# Patient Record
Sex: MALE | Race: WHITE | NOT HISPANIC OR LATINO | Employment: FULL TIME | URBAN - METROPOLITAN AREA
[De-identification: names, ages, dates, MRNs, and addresses within clinical notes are randomized per-mention and may not be internally consistent; named-entity substitution may affect disease eponyms.]

---

## 2017-01-12 ENCOUNTER — OFFICE VISIT (OUTPATIENT)
Dept: SLEEP MEDICINE | Facility: CLINIC | Age: 52
End: 2017-01-12
Payer: COMMERCIAL

## 2017-01-12 VITALS
SYSTOLIC BLOOD PRESSURE: 100 MMHG | BODY MASS INDEX: 34.75 KG/M2 | DIASTOLIC BLOOD PRESSURE: 60 MMHG | HEART RATE: 71 BPM | OXYGEN SATURATION: 95 % | WEIGHT: 249.13 LBS

## 2017-01-12 DIAGNOSIS — G47.33 OSA (OBSTRUCTIVE SLEEP APNEA): Primary | ICD-10-CM

## 2017-01-12 DIAGNOSIS — R09.81 NASAL CONGESTION: ICD-10-CM

## 2017-01-12 DIAGNOSIS — G47.00 INSOMNIA, UNSPECIFIED TYPE: ICD-10-CM

## 2017-01-12 PROCEDURE — 3078F DIAST BP <80 MM HG: CPT | Mod: S$GLB,,, | Performed by: INTERNAL MEDICINE

## 2017-01-12 PROCEDURE — 1159F MED LIST DOCD IN RCRD: CPT | Mod: S$GLB,,, | Performed by: INTERNAL MEDICINE

## 2017-01-12 PROCEDURE — 3074F SYST BP LT 130 MM HG: CPT | Mod: S$GLB,,, | Performed by: INTERNAL MEDICINE

## 2017-01-12 PROCEDURE — 99203 OFFICE O/P NEW LOW 30 MIN: CPT | Mod: S$GLB,,, | Performed by: INTERNAL MEDICINE

## 2017-01-12 PROCEDURE — 99999 PR PBB SHADOW E&M-EST. PATIENT-LVL III: CPT | Mod: PBBFAC,,, | Performed by: INTERNAL MEDICINE

## 2017-01-12 NOTE — PROGRESS NOTES
Geo Lang  was seen as a follow up.  Our last encounter was 1/4/2014.      CHIEF COMPLAINT:    Chief Complaint   Patient presents with    Sleep Apnea       HISTORY OF PRESENT ILLNESS: Geo Lang is a 51 y.o. male is here for sleep evaluation.   Our first encounter was 10/15/2013.  At that time, patient was recently evaluated by Dr. Castro for nasal congestion.  Laryngoscopy under Richter maneuver revealed obstruction in retro palatal and  retrolingual region.  Patient with loud snoring.  No witnessed apnea.  +fatigue upon awakening.  No sleepiness a/w driving.  No parasomnia.  No RLS symptoms.  No cataplexy.  Patient admits to having difficulty with sleep initiation and maintanence x several weeks.  Father recently passed away.  Patient was spending lots of his time to care for his father prior to his death.  After his father's death, patient tried to go to sleep earlier and have not been successful.      [place] Sleepiness Scale score during initial sleep evaluation was 10.    Patient underwent home study 12/2013 with ahi of 25.  Treatment options d/w patient.  Patient declined treatment at that time.  Patient is interested in therapy at this time.  No new issue since last visit.  Still with restless sleep.      SLEEP ROUTINE:  Activity the hour prior to sleep: watch tv in sofa    Bed partner:  none  Time to bed:  12 am  Lights off:  TV is on; sleep in sofa  Sleep onset latency:  20 minutes after xanax       Disruptions or awakenings:   0-1 time (no difficulty going back to sleep)  Wakeup time:      7:30 am  Perceived sleep quality:  tired       Daytime naps:      none  Weekend sleep routine:      3-4 am till 7:30 am  Caffeine use: 1 cup of ice coffee in am.    exercise habit:   none    PAST MEDICAL HISTORY:    Active Ambulatory Problems     Diagnosis Date Noted    Essential hypertension 10/02/2013    Anxiety 10/02/2013    LPRD (laryngopharyngeal reflux disease) 12/10/2013    GERD (gastroesophageal  reflux disease) 12/23/2013    Obesity (BMI 30-39.9) 06/09/2015    Pain in the chest 06/09/2015    Type 2 diabetes mellitus without complication 07/31/2015    HLD (hyperlipidemia) 07/31/2015    CALVIN (obstructive sleep apnea) 07/31/2015    Colon cancer screening 11/07/2015    Diverticulosis of large intestine without hemorrhage 11/07/2015     Resolved Ambulatory Problems     Diagnosis Date Noted    No Resolved Ambulatory Problems     Past Medical History   Diagnosis Date    Corneal abrasion 20 yrs ago    Diabetes mellitus type I     HTN (hypertension) 10/2/2013                PAST SURGICAL HISTORY:    Past Surgical History   Procedure Laterality Date    Nissen      Colonoscopy N/A 11/7/2015     Procedure: COLONOSCOPY;  Surgeon: Sanford Isidro MD;  Location: [facility] ([address] [place] [address]);  Service: Endoscopy;  Laterality: N/A;         FAMILY HISTORY:                Family History   Problem Relation Age of Onset    Hypertension Father     Hyperlipidemia Father     Heart failure Mother     Diabetes Mother     Hypertension Sister     Diabetes Sister     Hypertension Brother     Diabetes Brother     Hypertension Sister     No Known Problems Maternal Aunt     No Known Problems Maternal Uncle     No Known Problems Paternal Aunt     No Known Problems Paternal Uncle     No Known Problems Maternal Grandmother     No Known Problems Maternal Grandfather     No Known Problems Paternal Grandmother     No Known Problems Paternal Grandfather     Melanoma Neg Hx     Amblyopia Neg Hx     Blindness Neg Hx     Cancer Neg Hx     Cataracts Neg Hx     Glaucoma Neg Hx     Macular degeneration Neg Hx     Retinal detachment Neg Hx     Strabismus Neg Hx     Stroke Neg Hx     Thyroid disease Neg Hx        SOCIAL HISTORY:          Tobacco:   History   Smoking Status    Former Smoker    Packs/day: 1.00    Years: 15.00    Quit date: 3/15/2013   Smokeless Tobacco    Never Used       alcohol use:    History    Alcohol Use No                 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 30 tablet 2    atenolol (TENORMIN) 50 MG tablet Take 1 tablet (50 mg total) by mouth once daily. 90 tablet 3    atorvastatin (LIPITOR) 20 MG tablet Take 1 tablet (20 mg total) by mouth once daily. 90 tablet 3    blood sugar diagnostic Strp 1 strip by Misc.(Non-Drug; Combo Route) route 2 (two) times daily. New diabetic requiring more frequent testing. 100 strip 6    clobetasol 0.05% (TEMOVATE) 0.05 % Oint AAA bid 60 g 3    esomeprazole (NEXIUM) 40 MG capsule Take 1 capsule (40 mg total) by mouth before breakfast. Fill for name brand only. Per pt request 90 capsule 3    hydrochlorothiazide (MICROZIDE) 12.5 mg capsule Take 1 capsule (12.5 mg total) by mouth once daily. 90 capsule 3    INVOKAMET 50-1,000 mg Tab TAKE ONE TABLET BY MOUTH TWICE A DAY 60 tablet 6    lancets 33 gauge Misc 1 lancet by Misc.(Non-Drug; Combo Route) route once daily. 50 each 6    ondansetron (ZOFRAN) 8 MG tablet Take 1 tablet (8 mg total) by mouth every 12 (twelve) hours as needed for Nausea. 12 tablet 0    lisinopril (PRINIVIL,ZESTRIL) 20 MG tablet Take 1 tablet (20 mg total) by mouth once daily. 90 tablet 3     No current facility-administered medications for this visit.                   REVIEW OF SYSTEMS:     Sleep related symptoms as per HPI.    CONST:Denies weight gain    HEENT: Denies sinus congestion  PULM: Denies dyspnea  CARD:  Denies palpitations   GI:  Denies acid reflux  : Denies polyuria  NEURO: Denies headaches  PSYCH: Denies mood disturbance  HEME: Denies anemia   Otherwise, a balance of systems reviewed is negative.            PHYSICAL EXAM:  Vitals:    01/12/17 1452   BP: 100/60   Pulse: 71    SpO2: 95%   Weight: 113 kg (249 lb 1.9 oz)   PainSc: 0-No pain     Body mass index is 34.75 kg/(m^2).     GENERAL: Normal development, well groomed  HEENT:  Conjunctivae are non-erythematous; Pupils equal, round, and reactive to light; Nose is symmetrical; Nasal mucosa is pink and moist; Septum is midline; Inferior turbinates are normal; Nasal airflow is congested; Posterior pharynx is pink; Modified Mallampati: 4; Posterior palate is normal; Tonsils +2; Uvula is normal and pink;Tongue is normal; Dentition is fair; No TMJ tenderness; Jaw opening and protrusion without click and without discomfort.  NECK: Supple. Neck circumference is 17.75 inches. No thyromegaly. No palpable nodes.     SKIN: On face and neck: No abrasions, no rashes, no lesions.  No subcutaneous nodules are palpable.  RESPIRATORY: Chest is clear to auscultation.  Normal chest expansion and non-labored breathing at rest.  CARDIOVASCULAR: Normal S1, S2.  No murmurs, gallops or rubs. No carotid bruits bilaterally.  EXTREMITIES: No edema. No clubbing. No cyanosis. Station normal. Gait normal.        NEURO/PSYCH: Oriented to time, place and person. Normal attention span and concentration. Affect is full. Mood is normal.                                              DATA  12/9/13 home sleep testing ahi of 25    Lab Results   Component Value Date    TSH 2.012 11/20/2014     ASSESSMENT  1. CALVIN (obstructive sleep apnea)  CPAP FOR HOME USE   2. Insomnia, unspecified type     3. Nasal congestion         PLAN:    Sleep Apnea - result of sleep study d/w patient.  Moderate calvin.  Treatment recommended.  Willing to give apap a trial.      Insomnia -  Off xanax.  Improve since  Last visit.      Nasal congestion -  improve    Education: During our discussion today, we talked about the etiology of obstructive sleep apnea as well as the potential ramifications of untreated sleep apnea, which could include daytime sleepiness, hypertension, heart disease and/or stroke.   We discussed potential treatment options, which could include weight loss, body positioning, continuous positive airway pressure (CPAP), or referral for surgical consideration.    Precautions: The patient was advised to abstain from driving should they feel sleepy or drowsy.       Patient will Return in about 8 weeks (around 3/9/2017).

## 2017-01-15 DIAGNOSIS — E11.9 TYPE 2 DIABETES MELLITUS WITHOUT COMPLICATION: Chronic | ICD-10-CM

## 2017-01-16 RX ORDER — CANAGLIFLOZIN AND METFORMIN HYDROCHLORIDE 50; 1000 MG/1; MG/1
TABLET, FILM COATED ORAL
Qty: 60 TABLET | Refills: 5 | Status: SHIPPED | OUTPATIENT
Start: 2017-01-16 | End: 2017-08-23

## 2017-01-24 ENCOUNTER — PATIENT MESSAGE (OUTPATIENT)
Dept: INTERNAL MEDICINE | Facility: CLINIC | Age: 52
End: 2017-01-24

## 2017-01-24 RX ORDER — ESOMEPRAZOLE MAGNESIUM 40 MG/1
40 CAPSULE, DELAYED RELEASE ORAL
Qty: 90 CAPSULE | Refills: 3 | Status: SHIPPED | OUTPATIENT
Start: 2017-01-24 | End: 2018-08-07

## 2017-03-06 DIAGNOSIS — E11.9 TYPE 2 DIABETES MELLITUS WITHOUT COMPLICATION: ICD-10-CM

## 2017-03-17 ENCOUNTER — LAB VISIT (OUTPATIENT)
Dept: LAB | Facility: HOSPITAL | Age: 52
End: 2017-03-17
Attending: INTERNAL MEDICINE
Payer: COMMERCIAL

## 2017-03-17 DIAGNOSIS — E11.9 TYPE 2 DIABETES MELLITUS WITHOUT COMPLICATION: Chronic | ICD-10-CM

## 2017-03-17 LAB
CHOLEST/HDLC SERPL: 3.6 {RATIO}
ESTIMATED AVG GLUCOSE: 143 MG/DL
HBA1C MFR BLD HPLC: 6.6 %
HDL/CHOLESTEROL RATIO: 28.1 %
HDLC SERPL-MCNC: 128 MG/DL
HDLC SERPL-MCNC: 36 MG/DL
LDLC SERPL CALC-MCNC: 71.4 MG/DL
NONHDLC SERPL-MCNC: 92 MG/DL
TRIGL SERPL-MCNC: 103 MG/DL

## 2017-03-17 PROCEDURE — 83036 HEMOGLOBIN GLYCOSYLATED A1C: CPT

## 2017-03-17 PROCEDURE — 80061 LIPID PANEL: CPT

## 2017-03-17 PROCEDURE — 36415 COLL VENOUS BLD VENIPUNCTURE: CPT

## 2017-03-21 ENCOUNTER — OFFICE VISIT (OUTPATIENT)
Dept: INTERNAL MEDICINE | Facility: CLINIC | Age: 52
End: 2017-03-21
Payer: COMMERCIAL

## 2017-03-21 VITALS
SYSTOLIC BLOOD PRESSURE: 122 MMHG | HEART RATE: 84 BPM | WEIGHT: 254.88 LBS | BODY MASS INDEX: 35.68 KG/M2 | HEIGHT: 71 IN | DIASTOLIC BLOOD PRESSURE: 76 MMHG

## 2017-03-21 DIAGNOSIS — Z12.5 SCREENING FOR PROSTATE CANCER: ICD-10-CM

## 2017-03-21 DIAGNOSIS — K21.9 GASTROESOPHAGEAL REFLUX DISEASE WITHOUT ESOPHAGITIS: ICD-10-CM

## 2017-03-21 DIAGNOSIS — E11.9 TYPE 2 DIABETES MELLITUS WITHOUT COMPLICATION, WITHOUT LONG-TERM CURRENT USE OF INSULIN: Primary | Chronic | ICD-10-CM

## 2017-03-21 DIAGNOSIS — Z00.00 ROUTINE PHYSICAL EXAMINATION: ICD-10-CM

## 2017-03-21 DIAGNOSIS — I10 ESSENTIAL HYPERTENSION: Chronic | ICD-10-CM

## 2017-03-21 PROCEDURE — 3078F DIAST BP <80 MM HG: CPT | Mod: S$GLB,,, | Performed by: INTERNAL MEDICINE

## 2017-03-21 PROCEDURE — 99214 OFFICE O/P EST MOD 30 MIN: CPT | Mod: S$GLB,,, | Performed by: INTERNAL MEDICINE

## 2017-03-21 PROCEDURE — 3044F HG A1C LEVEL LT 7.0%: CPT | Mod: S$GLB,,, | Performed by: INTERNAL MEDICINE

## 2017-03-21 PROCEDURE — 3074F SYST BP LT 130 MM HG: CPT | Mod: S$GLB,,, | Performed by: INTERNAL MEDICINE

## 2017-03-21 PROCEDURE — 4010F ACE/ARB THERAPY RXD/TAKEN: CPT | Mod: S$GLB,,, | Performed by: INTERNAL MEDICINE

## 2017-03-21 PROCEDURE — 99999 PR PBB SHADOW E&M-EST. PATIENT-LVL III: CPT | Mod: PBBFAC,,, | Performed by: INTERNAL MEDICINE

## 2017-03-21 PROCEDURE — 1160F RVW MEDS BY RX/DR IN RCRD: CPT | Mod: S$GLB,,, | Performed by: INTERNAL MEDICINE

## 2017-03-21 NOTE — PROGRESS NOTES
Subjective:       Patient ID: Geo Lang is a 51 y.o. male.    Chief Complaint: Diabetes (6 month f/u)    HPI Comments: History of present illness: Patient comes in for 6 month follow-up of diabetes.  His cholesterol dropped a little but his A1c went up a little.  He knows he has room to focus on diet exercise and weight reduction.  His weight has crept up about 20 pounds in the last few visits.  He denies any chest pain shortness of breath fevers or chills.  No cough or wheeze.  No GI or  complaints.  He is up-to-date with his colonoscopy.    Diabetes   Hypoglycemia symptoms include nervousness/anxiousness (Infrequent). Pertinent negatives for hypoglycemia include no dizziness or headaches. Pertinent negatives for diabetes include no chest pain and no fatigue.     Review of Systems   Constitutional: Negative for chills, fatigue, fever and unexpected weight change.   HENT: Negative for trouble swallowing.    Eyes: Negative for visual disturbance.   Respiratory: Negative for cough, shortness of breath and wheezing.    Cardiovascular: Negative for chest pain and palpitations.   Gastrointestinal: Negative for abdominal pain, constipation, diarrhea, nausea and vomiting.   Genitourinary: Negative for difficulty urinating.   Musculoskeletal: Negative for neck pain.   Skin: Negative for rash.   Neurological: Negative for dizziness and headaches.   Psychiatric/Behavioral: Positive for sleep disturbance (infrequent). The patient is nervous/anxious (Infrequent).        Objective:      Physical Exam   Constitutional: He is oriented to person, place, and time. He appears well-developed and well-nourished. No distress.   Overweight male in no acute distress   HENT:   Head: Normocephalic and atraumatic.   Right Ear: External ear normal.   Left Ear: External ear normal.   Mouth/Throat: Oropharynx is clear and moist. No oropharyngeal exudate.   TM's clear, pharynx clear   Eyes: Conjunctivae and EOM are normal. Pupils are  equal, round, and reactive to light. No scleral icterus.   Neck: Normal range of motion. Neck supple. No thyromegaly present.   No supraclavicular nodes palpated   Cardiovascular: Normal rate, regular rhythm and normal heart sounds.    No murmur heard.  Pulmonary/Chest: Effort normal and breath sounds normal. He has no wheezes.   Abdominal: Soft. Bowel sounds are normal. He exhibits no mass. There is no tenderness.   Musculoskeletal: He exhibits no edema.   Lymphadenopathy:     He has no cervical adenopathy.   Neurological: He is alert and oriented to person, place, and time.   Skin: No pallor.   Psychiatric: He has a normal mood and affect.       Assessment:       1. Type 2 diabetes mellitus without complication, without long-term current use of insulin    2. Essential hypertension    3. Gastroesophageal reflux disease without esophagitis    4. Routine physical examination    5. Screening for prostate cancer        Plan:       Geo was seen today for diabetes.    Diagnoses and all orders for this visit:    Type 2 diabetes mellitus without complication, without long-term current use of insulin  -     CBC auto differential; Future  -     Lipid panel; Future  -     Hemoglobin A1c; Future  -     Comprehensive metabolic panel; Future    Essential hypertension    Gastroesophageal reflux disease without esophagitis    Routine physical examination  -     PSA, Screening; Future  -     CBC auto differential; Future  -     Lipid panel; Future  -     Hemoglobin A1c; Future  -     Comprehensive metabolic panel; Future    Screening for prostate cancer  -     PSA, Screening; Future        Follow-up in 6 months with labs

## 2017-04-11 ENCOUNTER — OFFICE VISIT (OUTPATIENT)
Dept: SLEEP MEDICINE | Facility: CLINIC | Age: 52
End: 2017-04-11
Payer: COMMERCIAL

## 2017-04-11 VITALS
WEIGHT: 254 LBS | HEART RATE: 69 BPM | OXYGEN SATURATION: 96 % | DIASTOLIC BLOOD PRESSURE: 76 MMHG | SYSTOLIC BLOOD PRESSURE: 124 MMHG | BODY MASS INDEX: 35.43 KG/M2

## 2017-04-11 DIAGNOSIS — R09.81 NASAL CONGESTION: ICD-10-CM

## 2017-04-11 DIAGNOSIS — G47.33 OSA (OBSTRUCTIVE SLEEP APNEA): Primary | ICD-10-CM

## 2017-04-11 DIAGNOSIS — G47.00 INSOMNIA, UNSPECIFIED TYPE: ICD-10-CM

## 2017-04-11 PROCEDURE — 3074F SYST BP LT 130 MM HG: CPT | Mod: S$GLB,,, | Performed by: INTERNAL MEDICINE

## 2017-04-11 PROCEDURE — 99999 PR PBB SHADOW E&M-EST. PATIENT-LVL III: CPT | Mod: PBBFAC,,, | Performed by: INTERNAL MEDICINE

## 2017-04-11 PROCEDURE — 3078F DIAST BP <80 MM HG: CPT | Mod: S$GLB,,, | Performed by: INTERNAL MEDICINE

## 2017-04-11 PROCEDURE — 1160F RVW MEDS BY RX/DR IN RCRD: CPT | Mod: S$GLB,,, | Performed by: INTERNAL MEDICINE

## 2017-04-11 PROCEDURE — 99214 OFFICE O/P EST MOD 30 MIN: CPT | Mod: S$GLB,,, | Performed by: INTERNAL MEDICINE

## 2017-04-11 NOTE — PROGRESS NOTES
Geo Lang  was seen as a follow up.  Our last encounter was 1/4/2014.      CHIEF COMPLAINT:    Chief Complaint   Patient presents with    Sleep Apnea       HISTORY OF PRESENT ILLNESS: Geo Lang is a 51 y.o. male is here for sleep evaluation.   Our first encounter was 10/15/2013.  At that time, patient was recently evaluated by Dr. Castro for nasal congestion.  Laryngoscopy under Richter maneuver revealed obstruction in retro palatal and  retrolingual region.  Patient with loud snoring.  No witnessed apnea.  +fatigue upon awakening.  No sleepiness a/w driving.  No parasomnia.  No RLS symptoms.  No cataplexy.  Patient admits to having difficulty with sleep initiation and maintanence x several weeks.  Father recently passed away.  Patient was spending lots of his time to care for his father prior to his death.  After his father's death, patient tried to go to sleep earlier and have not been successful.      [place] Sleepiness Scale score during initial sleep evaluation was 10.    Patient underwent home study 12/2013 with ahi of 25.  Treatment options d/w patient.  Patient declined treatment in 2013.  Patient was set up with apap.  With apap, patient denied snoring.  Sleep is more restful.      SLEEP ROUTINE:  Activity the hour prior to sleep: watch tv in sofa    Bed partner:  none  Time to bed:  12 am  Lights off:  TV is on; sleep in sofa  Sleep onset latency:  20 minutes after xanax       Disruptions or awakenings:   0-1 time (no difficulty going back to sleep)  Wakeup time:      7:30 am  Perceived sleep quality:  tired       Daytime naps:      none  Weekend sleep routine:      3-4 am till 7:30 am  Caffeine use: 1 cup of ice coffee in am.    exercise habit:   none    PAST MEDICAL HISTORY:    Active Ambulatory Problems     Diagnosis Date Noted    Essential hypertension 10/02/2013    Anxiety 10/02/2013    LPRD (laryngopharyngeal reflux disease) 12/10/2013    GERD (gastroesophageal reflux disease)  12/23/2013    Obesity (BMI 30-39.9) 06/09/2015    Pain in the chest 06/09/2015    Type 2 diabetes mellitus without complication 07/31/2015    HLD (hyperlipidemia) 07/31/2015    CALVIN (obstructive sleep apnea) 07/31/2015    Colon cancer screening 11/07/2015    Diverticulosis of large intestine without hemorrhage 11/07/2015     Resolved Ambulatory Problems     Diagnosis Date Noted    No Resolved Ambulatory Problems     Past Medical History:   Diagnosis Date    Anxiety 10/2/2013    Corneal abrasion 20 yrs ago    Diabetes mellitus type I     GERD (gastroesophageal reflux disease)     HTN (hypertension) 10/2/2013    LPRD (laryngopharyngeal reflux disease) 12/10/2013                PAST SURGICAL HISTORY:    Past Surgical History:   Procedure Laterality Date    COLONOSCOPY N/A 11/7/2015    Procedure: COLONOSCOPY;  Surgeon: Sanford Isidro MD;  Location: [facility] ([address] [place] [address]);  Service: Endoscopy;  Laterality: N/A;    nissen           FAMILY HISTORY:                Family History   Problem Relation Age of Onset    Hypertension Father     Hyperlipidemia Father     Heart failure Mother     Diabetes Mother     Hypertension Sister     Diabetes Sister     Hypertension Brother     Diabetes Brother     Hypertension Sister     No Known Problems Maternal Aunt     No Known Problems Maternal Uncle     No Known Problems Paternal Aunt     No Known Problems Paternal Uncle     No Known Problems Maternal Grandmother     No Known Problems Maternal Grandfather     No Known Problems Paternal Grandmother     No Known Problems Paternal Grandfather     Melanoma Neg Hx     Amblyopia Neg Hx     Blindness Neg Hx     Cancer Neg Hx     Cataracts Neg Hx     Glaucoma Neg Hx     Macular degeneration Neg Hx     Retinal detachment Neg Hx     Strabismus Neg Hx     Stroke Neg Hx     Thyroid disease Neg Hx        SOCIAL HISTORY:          Tobacco:   History   Smoking Status    Former Smoker    Packs/day: 1.00     Years: 15.00    Quit date: 3/15/2013   Smokeless Tobacco    Never Used       alcohol use:    History   Alcohol Use No                 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 30 tablet 2    atenolol (TENORMIN) 50 MG tablet Take 1 tablet (50 mg total) by mouth once daily. 90 tablet 3    atorvastatin (LIPITOR) 20 MG tablet Take 1 tablet (20 mg total) by mouth once daily. 90 tablet 3    blood sugar diagnostic Strp 1 strip by Misc.(Non-Drug; Combo Route) route 2 (two) times daily. New diabetic requiring more frequent testing. 100 strip 6    clobetasol 0.05% (TEMOVATE) 0.05 % Oint AAA bid 60 g 3    esomeprazole (NEXIUM) 40 MG capsule Take 1 capsule (40 mg total) by mouth before breakfast. Please allow pt to fill GENERIC brand. 90 capsule 3    hydrochlorothiazide (MICROZIDE) 12.5 mg capsule Take 1 capsule (12.5 mg total) by mouth once daily. 90 capsule 3    INVOKAMET 50-1,000 mg Tab TAKE 1 TABLET BY MOUTH TWICE DAILY. 60 tablet 5    lancets 33 gauge Misc 1 lancet by Misc.(Non-Drug; Combo Route) route once daily. 50 each 6    ondansetron (ZOFRAN) 8 MG tablet Take 1 tablet (8 mg total) by mouth every 12 (twelve) hours as needed for Nausea. 12 tablet 0    lisinopril (PRINIVIL,ZESTRIL) 20 MG tablet Take 1 tablet (20 mg total) by mouth once daily. 90 tablet 3     No current facility-administered medications for this visit.                   REVIEW OF SYSTEMS:     No acute changes from previous encounter dated 1/12/2017 with exceptions mentioned in HPI.             PHYSICAL EXAM:  Vitals:    04/11/17 1359   BP: 124/76   Pulse: 69   SpO2: 96%   Weight: 115.2 kg (254 lb)   PainSc: 0-No pain     Body mass index is 35.43 kg/(m^2).     GENERAL: Normal development,  well groomed  HEENT:  Conjunctivae are non-erythematous; Pupils equal, round, and reactive to light; Nose is symmetrical; Nasal mucosa is pink and moist; Septum is midline; Inferior turbinates are normal; Nasal airflow is congested; Posterior pharynx is pink; Modified Mallampati: 4; Posterior palate is normal; Tonsils +2; Uvula is normal and pink;Tongue is normal; Dentition is fair; No TMJ tenderness; Jaw opening and protrusion without click and without discomfort.  NECK: Supple. Neck circumference is 17.75 inches. No thyromegaly. No palpable nodes.     SKIN: On face and neck: No abrasions, no rashes, no lesions.  No subcutaneous nodules are palpable.  RESPIRATORY: Chest is clear to auscultation.  Normal chest expansion and non-labored breathing at rest.  CARDIOVASCULAR: Normal S1, S2.  No murmurs, gallops or rubs. No carotid bruits bilaterally.  EXTREMITIES: No edema. No clubbing. No cyanosis. Station normal. Gait normal.        NEURO/PSYCH: Oriented to time, place and person. Normal attention span and concentration. Affect is full. Mood is normal.                                              DATA  12/9/13 home sleep testing ahi of 25    Lab Results   Component Value Date    TSH 2.012 11/20/2014     ASSESSMENT  1. CALVIN (obstructive sleep apnea)     2. Insomnia, unspecified type     3. Nasal congestion         PLAN:    Sleep Apnea - doing well with apap and [employer].  93%>4 hours.  Residual ahi of 4.1    Insomnia -  Off xanax.  Resolved.  Encourage extending sleep time to min tst of 6.5 hours.      Nasal congestion -  Improve with apap.      Education: During our discussion today, we talked about the etiology of obstructive sleep apnea as well as the potential ramifications of untreated sleep apnea, which could include daytime sleepiness, hypertension, heart disease and/or stroke.     Precautions: The patient was advised to abstain from driving should they feel sleepy or drowsy.       Patient will Return in about 1  year (around 4/11/2018).    This is 25 minutes visit, over 50% of time spent in direct consultation with patient.

## 2017-06-20 ENCOUNTER — PATIENT MESSAGE (OUTPATIENT)
Dept: INTERNAL MEDICINE | Facility: CLINIC | Age: 52
End: 2017-06-20

## 2017-06-21 ENCOUNTER — PATIENT MESSAGE (OUTPATIENT)
Dept: INTERNAL MEDICINE | Facility: CLINIC | Age: 52
End: 2017-06-21

## 2017-06-21 ENCOUNTER — OFFICE VISIT (OUTPATIENT)
Dept: INTERNAL MEDICINE | Facility: CLINIC | Age: 52
End: 2017-06-21
Payer: COMMERCIAL

## 2017-06-21 VITALS
BODY MASS INDEX: 36.79 KG/M2 | HEART RATE: 68 BPM | HEIGHT: 71 IN | SYSTOLIC BLOOD PRESSURE: 132 MMHG | DIASTOLIC BLOOD PRESSURE: 80 MMHG | WEIGHT: 262.81 LBS

## 2017-06-21 DIAGNOSIS — I10 ESSENTIAL HYPERTENSION: Chronic | ICD-10-CM

## 2017-06-21 DIAGNOSIS — E11.9 TYPE 2 DIABETES MELLITUS WITHOUT COMPLICATION, WITHOUT LONG-TERM CURRENT USE OF INSULIN: Chronic | ICD-10-CM

## 2017-06-21 DIAGNOSIS — R53.83 FATIGUE, UNSPECIFIED TYPE: Primary | ICD-10-CM

## 2017-06-21 PROCEDURE — 3044F HG A1C LEVEL LT 7.0%: CPT | Mod: S$GLB,,, | Performed by: INTERNAL MEDICINE

## 2017-06-21 PROCEDURE — 4010F ACE/ARB THERAPY RXD/TAKEN: CPT | Mod: S$GLB,,, | Performed by: INTERNAL MEDICINE

## 2017-06-21 PROCEDURE — 99214 OFFICE O/P EST MOD 30 MIN: CPT | Mod: S$GLB,,, | Performed by: INTERNAL MEDICINE

## 2017-06-21 PROCEDURE — 99999 PR PBB SHADOW E&M-EST. PATIENT-LVL IV: CPT | Mod: PBBFAC,,, | Performed by: INTERNAL MEDICINE

## 2017-06-21 NOTE — PROGRESS NOTES
Subjective:       Patient ID: Geo Lang is a 51 y.o. male.    Chief Complaint: Fatigue    History of present illness: Patient complains of fatigue and wanted to discuss some possible causes.  51-year-old male with diabetes and sleep apnea but those have generally been well controlled lately and I suspect not the cause.  A friend had low iron and asked him about getting his iron and B12 levels checked so the patient wanted to discuss that.  He had a CBC about 3 weeks ago with normal blood count and MCV so I think low iron and low B12 is unlikely.  He is gained nearly 30 pounds in the last 16 months.  He admits he is not exercising or eating like he previously was and I suspect that is the cause.  Certainly weight gain could cause fatigue or lack of exercise can cause fatigue.  He also only sleeps about 6 hours a night (by choice) and I did suggest he try to extend that by an hour or so.  No chest pain shortness of breath fevers or chills.  No cough or wheeze.      Fatigue   Associated symptoms include fatigue. Pertinent negatives include no abdominal pain, chest pain, chills, coughing, fever, headaches, nausea, neck pain, rash or vomiting.     Review of Systems   Constitutional: Positive for fatigue. Negative for chills, fever and unexpected weight change.   HENT: Negative for trouble swallowing.    Eyes: Negative for visual disturbance.   Respiratory: Negative for cough, shortness of breath and wheezing.    Cardiovascular: Negative for chest pain and palpitations.   Gastrointestinal: Negative for abdominal pain, constipation, diarrhea, nausea and vomiting.   Genitourinary: Negative for difficulty urinating.   Musculoskeletal: Negative for neck pain.   Skin: Negative for rash.   Neurological: Negative for dizziness and headaches.       Objective:      Physical Exam   Constitutional: He is oriented to person, place, and time. He appears well-developed and well-nourished. No distress.   HENT:   Head:  Normocephalic and atraumatic.   Mouth/Throat: No oropharyngeal exudate.   TM's clear, pharynx clear   Eyes: Conjunctivae and EOM are normal. Pupils are equal, round, and reactive to light. No scleral icterus.   Neck: Normal range of motion. Neck supple. No thyromegaly present.   No supraclavicular nodes palpated   Cardiovascular: Normal rate, regular rhythm and normal heart sounds.    No murmur heard.  Pulmonary/Chest: Effort normal and breath sounds normal. He has no wheezes.   Abdominal: Soft. Bowel sounds are normal. He exhibits no mass. There is no tenderness.   Musculoskeletal: He exhibits no edema.   Lymphadenopathy:     He has no cervical adenopathy.   Neurological: He is alert and oriented to person, place, and time.   Skin: No pallor.   Psychiatric: He has a normal mood and affect.       Assessment:       1. Fatigue, unspecified type    2. Type 2 diabetes mellitus without complication, without long-term current use of insulin    3. Essential hypertension        Plan:       Geo was seen today for fatigue.    Diagnoses and all orders for this visit:    Fatigue, unspecified type  -     TSH; Future    Type 2 diabetes mellitus without complication, without long-term current use of insulin  -     TSH; Future    Essential hypertension  -     TSH; Future        patient may try an over-the-counter B12 supplement.  Do not take extra iron.  Continue to monitor blood pressure  Try to lose at least 5-10 pounds in the next few months.  Call/follow-up depending on response

## 2017-07-14 RX ORDER — LISINOPRIL 20 MG/1
TABLET ORAL
Qty: 90 TABLET | Refills: 0 | Status: SHIPPED | OUTPATIENT
Start: 2017-07-14 | End: 2017-10-14 | Stop reason: SDUPTHER

## 2017-08-21 DIAGNOSIS — E11.9 TYPE 2 DIABETES MELLITUS WITHOUT COMPLICATION: Chronic | ICD-10-CM

## 2017-08-21 DIAGNOSIS — I10 ESSENTIAL HYPERTENSION: Chronic | ICD-10-CM

## 2017-08-21 RX ORDER — ATORVASTATIN CALCIUM 20 MG/1
TABLET, FILM COATED ORAL
Qty: 30 TABLET | Refills: 11 | Status: SHIPPED | OUTPATIENT
Start: 2017-08-21 | End: 2018-08-07 | Stop reason: SDUPTHER

## 2017-08-21 RX ORDER — ATENOLOL 50 MG/1
TABLET ORAL
Qty: 90 TABLET | Refills: 3 | Status: SHIPPED | OUTPATIENT
Start: 2017-08-21 | End: 2018-08-07 | Stop reason: SDUPTHER

## 2017-08-21 RX ORDER — HYDROCHLOROTHIAZIDE 12.5 MG/1
CAPSULE ORAL
Qty: 90 CAPSULE | Refills: 3 | Status: SHIPPED | OUTPATIENT
Start: 2017-08-21 | End: 2018-08-07 | Stop reason: SDUPTHER

## 2017-08-23 ENCOUNTER — OFFICE VISIT (OUTPATIENT)
Dept: INTERNAL MEDICINE | Facility: CLINIC | Age: 52
End: 2017-08-23
Payer: COMMERCIAL

## 2017-08-23 ENCOUNTER — OFFICE VISIT (OUTPATIENT)
Dept: OPTOMETRY | Facility: CLINIC | Age: 52
End: 2017-08-23
Payer: COMMERCIAL

## 2017-08-23 VITALS
DIASTOLIC BLOOD PRESSURE: 82 MMHG | HEIGHT: 71 IN | SYSTOLIC BLOOD PRESSURE: 136 MMHG | HEART RATE: 110 BPM | BODY MASS INDEX: 35.12 KG/M2 | WEIGHT: 250.88 LBS

## 2017-08-23 DIAGNOSIS — E11.9 TYPE 2 DIABETES MELLITUS WITHOUT COMPLICATION, WITHOUT LONG-TERM CURRENT USE OF INSULIN: Primary | Chronic | ICD-10-CM

## 2017-08-23 DIAGNOSIS — E78.5 HYPERLIPIDEMIA, UNSPECIFIED HYPERLIPIDEMIA TYPE: Chronic | ICD-10-CM

## 2017-08-23 DIAGNOSIS — G47.33 OSA (OBSTRUCTIVE SLEEP APNEA): Chronic | ICD-10-CM

## 2017-08-23 DIAGNOSIS — E11.9 DIABETES MELLITUS TYPE 2 WITHOUT RETINOPATHY: Primary | ICD-10-CM

## 2017-08-23 DIAGNOSIS — L84 CALLUS OF FOOT: ICD-10-CM

## 2017-08-23 DIAGNOSIS — H40.013 OAG (OPEN ANGLE GLAUCOMA) SUSPECT, LOW RISK, BILATERAL: ICD-10-CM

## 2017-08-23 PROCEDURE — 4010F ACE/ARB THERAPY RXD/TAKEN: CPT | Mod: S$GLB,,, | Performed by: INTERNAL MEDICINE

## 2017-08-23 PROCEDURE — 99214 OFFICE O/P EST MOD 30 MIN: CPT | Mod: S$GLB,,, | Performed by: INTERNAL MEDICINE

## 2017-08-23 PROCEDURE — 99999 PR PBB SHADOW E&M-EST. PATIENT-LVL III: CPT | Mod: PBBFAC,,, | Performed by: INTERNAL MEDICINE

## 2017-08-23 PROCEDURE — 92133 CPTRZD OPH DX IMG PST SGM ON: CPT | Mod: S$GLB,,, | Performed by: OPTOMETRIST

## 2017-08-23 PROCEDURE — 3044F HG A1C LEVEL LT 7.0%: CPT | Mod: S$GLB,,, | Performed by: INTERNAL MEDICINE

## 2017-08-23 PROCEDURE — 3079F DIAST BP 80-89 MM HG: CPT | Mod: S$GLB,,, | Performed by: INTERNAL MEDICINE

## 2017-08-23 PROCEDURE — 92014 COMPRE OPH EXAM EST PT 1/>: CPT | Mod: S$GLB,,, | Performed by: OPTOMETRIST

## 2017-08-23 PROCEDURE — 3008F BODY MASS INDEX DOCD: CPT | Mod: S$GLB,,, | Performed by: INTERNAL MEDICINE

## 2017-08-23 PROCEDURE — 99999 PR PBB SHADOW E&M-EST. PATIENT-LVL III: CPT | Mod: PBBFAC,,, | Performed by: OPTOMETRIST

## 2017-08-23 PROCEDURE — 3075F SYST BP GE 130 - 139MM HG: CPT | Mod: S$GLB,,, | Performed by: INTERNAL MEDICINE

## 2017-08-23 RX ORDER — METFORMIN HYDROCHLORIDE 1000 MG/1
1000 TABLET ORAL 2 TIMES DAILY WITH MEALS
Qty: 60 TABLET | Refills: 12 | Status: SHIPPED | OUTPATIENT
Start: 2017-08-23 | End: 2017-08-28

## 2017-08-23 RX ORDER — DAPAGLIFLOZIN 5 MG/1
5 TABLET, FILM COATED ORAL DAILY
Qty: 30 TABLET | Refills: 12 | Status: SHIPPED | OUTPATIENT
Start: 2017-08-23 | End: 2017-08-28

## 2017-08-23 NOTE — PROGRESS NOTES
Subjective:       Patient ID: Geo Lang is a 51 y.o. male.    Chief Complaint: Diabetes    History of present illness: Patient here to follow-up diabetes especially with new information about Invokana and leg amputations.  The patient has been exercising particularly running on a treadmill and has lost about 12 pounds.  He seemed to develop 2 calluses on the left foot and the one under the first metatarsal became very tender.  It was painful to walk and he had to stop exercising for 3 days.  He made an appointment with podiatry but was concerned about the new warnings with Invokana, and wanted to address this.  I explained that we could switch to a different medicine in this class since it has worked well but eventually this may be a class warning but for now we'll try Farxiga and separate the metformin.  Hopefully if he continues to lose weight if sugars will stay as well controlled as they are now.  He denies any GI or  complaints.  The foot is feeling a little better but I asked him to keep the podiatry appointment.  He also was out of atenolol today and is picking up his new prescription.  His blood pressure was a little elevated on the first check       Diabetes   Pertinent negatives for hypoglycemia include no dizziness or headaches. Pertinent negatives for diabetes include no chest pain and no fatigue.     Review of Systems   Constitutional: Negative for chills, fatigue, fever and unexpected weight change (intentional weight loss).   HENT: Negative for trouble swallowing.    Eyes: Negative for visual disturbance.   Respiratory: Negative for cough, shortness of breath and wheezing.    Cardiovascular: Negative for chest pain and palpitations.   Gastrointestinal: Negative for abdominal pain, constipation, diarrhea, nausea and vomiting.   Genitourinary: Negative for difficulty urinating.   Musculoskeletal: Positive for arthralgias and joint swelling (left great toe). Negative for neck pain.   Skin:  Negative for rash.        Callus formation at the first MTP and great toe   Neurological: Negative for dizziness and headaches.       Objective:      Physical Exam   Constitutional: He is oriented to person, place, and time. He appears well-developed and well-nourished. No distress.   HENT:   Head: Normocephalic and atraumatic.   Mouth/Throat: No oropharyngeal exudate.   TM's clear, pharynx clear   Eyes: Conjunctivae and EOM are normal. Pupils are equal, round, and reactive to light. No scleral icterus.   Neck: Normal range of motion. Neck supple. No thyromegaly present.   No supraclavicular nodes palpated   Cardiovascular: Normal rate, regular rhythm and normal heart sounds.    No murmur heard.  Pulmonary/Chest: Effort normal and breath sounds normal. He has no wheezes.   Abdominal: Soft. Bowel sounds are normal. He exhibits no mass. There is no tenderness.   Musculoskeletal: He exhibits tenderness (tender callus on the bottom of the left footno signs of infection or drainage.). He exhibits no edema.   Lymphadenopathy:     He has no cervical adenopathy.   Neurological: He is alert and oriented to person, place, and time.   Skin: No pallor.   Psychiatric: He has a normal mood and affect.       Assessment:       1. Type 2 diabetes mellitus without complication, without long-term current use of insulin    2. Hyperlipidemia, unspecified hyperlipidemia type    3. CALVIN (obstructive sleep apnea)    4. Callus of foot        Plan:       Geo was seen today for diabetes.    Diagnoses and all orders for this visit:    Type 2 diabetes mellitus without complication, without long-term current use of insulin    Hyperlipidemia, unspecified hyperlipidemia type    CALVIN (obstructive sleep apnea)    Callus of foot    Other orders  -     dapagliflozin (FARXIGA) 5 mg Tab tablet; Take 1 tablet (5 mg total) by mouth once daily.  -     metformin (GLUCOPHAGE) 1000 MG tablet; Take 1 tablet (1,000 mg total) by mouth 2 (two) times daily with  meals.        Follow-up after podiatry.  Send me a list of blood pressure and sugar readings as well

## 2017-08-23 NOTE — PROGRESS NOTES
HPI     DLS:  8/11/16    Pt here for diabetic eye check.    Pt without visual complaints today OU.    Pt states he still gets floaters.  declines refraction.  Declines Contact fit, only 10 mos ago  (-)flashes  (+)headaches  (-)eye pain  (-)itching  (-)tearing  (-)burning    No eyedrops  No eye surgery     Hemoglobin A1C       Date                     Value               Ref Range             Status                03/17/2017               6.6 (H)             4.5 - 6.2 %           Final                  08/15/2016               6.4 (H)             4.5 - 6.2 %           Final                  11/06/2015               6.3 (H)             4.5 - 6.2 %           Final            ----------    Last edited by Hao Austin, OD on 8/23/2017  2:41 PM. (History)            Assessment /Plan     For exam results, see Encounter Report.    Diabetes mellitus type 2 without retinopathy  -No retinopathy noted today.  Continued control with primary care physician and annual comprehensive eye exam.    OAG (open angle glaucoma) suspect, low risk, bilateral  -     OCT - Optic Nerve  -Borderline IOP, OCT, CDs  -no FHx  -monitor as higher risk HVF, OCT annual      RTC 1 yr

## 2017-08-28 ENCOUNTER — PATIENT MESSAGE (OUTPATIENT)
Dept: INTERNAL MEDICINE | Facility: CLINIC | Age: 52
End: 2017-08-28

## 2017-08-28 DIAGNOSIS — E11.9 TYPE 2 DIABETES MELLITUS WITHOUT COMPLICATION: Chronic | ICD-10-CM

## 2017-08-28 RX ORDER — CANAGLIFLOZIN AND METFORMIN HYDROCHLORIDE 50; 1000 MG/1; MG/1
TABLET, FILM COATED ORAL
Qty: 60 TABLET | Refills: 5 | Status: SHIPPED | OUTPATIENT
Start: 2017-08-28 | End: 2018-01-19

## 2017-08-28 NOTE — TELEPHONE ENCOUNTER
Pt [facility] message:         Hi Dr. Velasco-   I have decided to stay with the Invokamet for now. I did not realize that I was near the end of my script and had no refills. i placed the order at [facility], and they have or will be sending a request to refill. please write the refill prescription so i can pick them up today, as i am completely out right now and need todays dose.   Thanks

## 2017-08-28 NOTE — TELEPHONE ENCOUNTER
I got a request from pharmacy for Invokamet but last week he wanted to stop this. Does he want to restart it?

## 2017-08-29 ENCOUNTER — PATIENT MESSAGE (OUTPATIENT)
Dept: ENDOCRINOLOGY | Facility: CLINIC | Age: 52
End: 2017-08-29

## 2017-08-29 DIAGNOSIS — E11.9 TYPE 2 DIABETES MELLITUS WITHOUT COMPLICATION, WITHOUT LONG-TERM CURRENT USE OF INSULIN: Primary | Chronic | ICD-10-CM

## 2017-09-13 ENCOUNTER — OFFICE VISIT (OUTPATIENT)
Dept: PODIATRY | Facility: CLINIC | Age: 52
End: 2017-09-13
Payer: COMMERCIAL

## 2017-09-13 VITALS
SYSTOLIC BLOOD PRESSURE: 139 MMHG | DIASTOLIC BLOOD PRESSURE: 89 MMHG | HEIGHT: 71 IN | BODY MASS INDEX: 35.42 KG/M2 | HEART RATE: 68 BPM | WEIGHT: 253 LBS

## 2017-09-13 DIAGNOSIS — E11.9 ENCOUNTER FOR DIABETIC FOOT EXAM: Primary | ICD-10-CM

## 2017-09-13 DIAGNOSIS — E11.9 TYPE 2 DIABETES MELLITUS WITHOUT COMPLICATION, UNSPECIFIED LONG TERM INSULIN USE STATUS: Chronic | ICD-10-CM

## 2017-09-13 PROCEDURE — 4010F ACE/ARB THERAPY RXD/TAKEN: CPT | Mod: S$GLB,,, | Performed by: PODIATRIST

## 2017-09-13 PROCEDURE — 3075F SYST BP GE 130 - 139MM HG: CPT | Mod: S$GLB,,, | Performed by: PODIATRIST

## 2017-09-13 PROCEDURE — 3044F HG A1C LEVEL LT 7.0%: CPT | Mod: S$GLB,,, | Performed by: PODIATRIST

## 2017-09-13 PROCEDURE — 99999 PR PBB SHADOW E&M-EST. PATIENT-LVL III: CPT | Mod: PBBFAC,,, | Performed by: PODIATRIST

## 2017-09-13 PROCEDURE — 3079F DIAST BP 80-89 MM HG: CPT | Mod: S$GLB,,, | Performed by: PODIATRIST

## 2017-09-13 PROCEDURE — 3008F BODY MASS INDEX DOCD: CPT | Mod: S$GLB,,, | Performed by: PODIATRIST

## 2017-09-13 PROCEDURE — 99214 OFFICE O/P EST MOD 30 MIN: CPT | Mod: S$GLB,,, | Performed by: PODIATRIST

## 2017-09-13 NOTE — PROGRESS NOTES
CC:     Foot exam       HPI:   The patient is a 51 y.o.  male  who presents for a diabetic foot exam.     Patient reports no presence of abnormal sensation to the feet .    History of diabetic foot ulcers: none   History of foot surgery: none.     Shoes worn today:  Work boots  Patient is concerned that Invokamet puts him at high risk for amputation (he saw this in the commercial).           Primary care doctor is: Pj Velasco MD  Patient last saw primary care doctor on:   Chief Complaint   Patient presents with    Callouses     lt foot     Diabetic Foot Exam     tingling sensation bilateral pcp 8/23/17 Dr Velasco          Past Medical History:   Diagnosis Date    Anxiety 10/2/2013    Corneal abrasion 20 yrs ago    ou from [facility]    Diabetes mellitus type I     GERD (gastroesophageal reflux disease)     HTN (hypertension) 10/2/2013    LPRD (laryngopharyngeal reflux disease) 12/10/2013         Current Outpatient Prescriptions on File Prior to Visit   Medication Sig Dispense Refill    alprazolam (XANAX) 1 MG tablet Take 1 tablet (1 mg total) by mouth 2 (two) times daily. 30 tablet 2    atenolol (TENORMIN) 50 MG tablet TAKE ONE TABLET BY MOUTH ONCE DAILY 90 tablet 3    atorvastatin (LIPITOR) 20 MG tablet TAKE ONE TABLET BY MOUTH ONCE DAILY 30 tablet 11    blood sugar diagnostic Strp 1 strip by Misc.(Non-Drug; Combo Route) route 2 (two) times daily. New diabetic requiring more frequent testing. 100 strip 6    clobetasol 0.05% (TEMOVATE) 0.05 % Oint AAA bid 60 g 3    esomeprazole (NEXIUM) 40 MG capsule Take 1 capsule (40 mg total) by mouth before breakfast. Please allow pt to fill GENERIC brand. 90 capsule 3    hydrochlorothiazide (MICROZIDE) 12.5 mg capsule TAKE ONE CAPSULE BY MOUTH ONCE DAILY 90 capsule 3    INVOKAMET 50-1,000 mg Tab TAKE ONE TABLET BY MOUTH TWICE A DAY 60 tablet 5    lancets 33 gauge Misc 1 lancet by Misc.(Non-Drug; Combo Route) route once daily. 50 each 6    lisinopril  (PRINIVIL,ZESTRIL) 20 MG tablet TAKE ONE TABLET BY MOUTH ONCE DAILY 90 tablet 0    ondansetron (ZOFRAN) 8 MG tablet Take 1 tablet (8 mg total) by mouth every 12 (twelve) hours as needed for Nausea. 12 tablet 0     No current facility-administered medications on file prior to visit.          Review of patient's allergies indicates:  No Known Allergies      Past Surgical History:   Procedure Laterality Date    COLONOSCOPY N/A 11/7/2015    Procedure: COLONOSCOPY;  Surgeon: Sanford Isidro MD;  Location: [address];  Service: Endoscopy;  Laterality: N/A;    nissen               ROS:  General ROS: negative  Respiratory ROS: no cough, shortness of breath, or wheezing  Cardiovascular ROS: no chest pain or dyspnea on exertion  Musculoskeletal ROS: negative  Neurological ROS:   negative for - impaired coordination/balance or numbness/tingling  Dermatological ROS: negative      LAST HbA1c:   Hemoglobin A1C   Date Value Ref Range Status   03/17/2017 6.6 (H) 4.5 - 6.2 % Final     Comment:     According to ADA guidelines, hemoglobin A1C <7.0% represents  optimal control in non-pregnant diabetic patients.  Different  metrics may apply to specific populations.   Standards of Medical Care in Diabetes - 2016.  For the purpose of screening for the presence of diabetes:  <5.7%     Consistent with the absence of diabetes  5.7-6.4%  Consistent with increasing risk for diabetes   (prediabetes)  >or=6.5%  Consistent with diabetes  Currently no consensus exists for use of hemoglobin A1C  for diagnosis of diabetes for children.     08/15/2016 6.4 (H) 4.5 - 6.2 % Final     Comment:     According to ADA guidelines, hemoglobin A1C <7.0% represents  optimal control in non-pregnant diabetic patients.  Different  metrics may apply to specific populations.   Standards of Medical Care in Diabetes - 2016.  For the purpose of screening for the presence of diabetes:  <5.7%     Consistent with the absence of diabetes  5.7-6.4%  Consistent  "with increasing risk for diabetes   (prediabetes)  >or=6.5%  Consistent with diabetes  Currently no consensus exists for use of hemoglobin A1C  for diagnosis of diabetes for children.     11/06/2015 6.3 (H) 4.5 - 6.2 % Final           EXAM:   Vitals:    09/13/17 1055   BP: 139/89   BP Location: Right arm   Patient Position: Sitting   BP Method: Medium (Automatic)   Pulse: 68   Weight: 114.8 kg (253 lb)   Height: 5' 11" (1.803 m)       General: alert, no distress, cooperative    Vascular:   Dorsalis pedis:   1+ bilateral.   Posterior Tibial:   1+ bilateral.   3 seconds capillary refill time   Temperature of toes are cool to touch.   normal hair growth on the feet.    Edema on feet:   none   Varicosities:  spider veins on the bilateral    Dermatological:    Skin: thin,  Warm and dry. no hyperpigmentation, vitiligo, or suspicious lesions  Nails: toenails 1-5 L and 1-5 R  are yellow and short  Callus:  Mild - at the medial bilateral hallux and sub 1st and sub 5th metatarsal heads bilateral     Open Wounds: None  Ecchymoses is not observed.      Erythema:  none .     Interdigital spaces: clean, dry and without evidence of break in skin integrity      Neurological:    normal light touch sensation and normal position sensation  [place] normal        Musculoskeletal:     Muscle strength: 5/5, adequate ROM, adequate strength     Right foot:  Mild bunion  Left foot:  Mild bunion             ASSESSMENT/PLAN:      I counseled the patient on his conditions, their implications and medical management.       Encounter for diabetic foot exam    Type 2 diabetes mellitus without complication, unspecified long term insulin use status      Shoe inspection. Diabetic Foot Education. Patient reminded of the importance of good nutrition and blood sugar control to help prevent podiatric complications of diabetes. Patient instructed on proper foot hygiene. We discussed wearing proper shoe gear, daily foot inspections, never walking without " protective shoe gear, never putting sharp instruments to feet.    Return in about 1 year (around 9/13/2018) for diabetic foot exam, or sooner if concerned.             Meena Ayala DPM

## 2017-09-13 NOTE — PATIENT INSTRUCTIONS
Hemoglobin A1C   Date Value Ref Range Status   03/17/2017 6.6 (H) 4.5 - 6.2 % Final     Comment:     According to ADA guidelines, hemoglobin A1C <7.0% represents  optimal control in non-pregnant diabetic patients.  Different  metrics may apply to specific populations.   Standards of Medical Care in Diabetes - 2016.  For the purpose of screening for the presence of diabetes:  <5.7%     Consistent with the absence of diabetes  5.7-6.4%  Consistent with increasing risk for diabetes   (prediabetes)  >or=6.5%  Consistent with diabetes  Currently no consensus exists for use of hemoglobin A1C  for diagnosis of diabetes for children.     08/15/2016 6.4 (H) 4.5 - 6.2 % Final     Comment:     According to ADA guidelines, hemoglobin A1C <7.0% represents  optimal control in non-pregnant diabetic patients.  Different  metrics may apply to specific populations.   Standards of Medical Care in Diabetes - 2016.  For the purpose of screening for the presence of diabetes:  <5.7%     Consistent with the absence of diabetes  5.7-6.4%  Consistent with increasing risk for diabetes   (prediabetes)  >or=6.5%  Consistent with diabetes  Currently no consensus exists for use of hemoglobin A1C  for diagnosis of diabetes for children.     11/06/2015 6.3 (H) 4.5 - 6.2 % Final       How to Check Your Feet    Below are tips to help you look for foot problems. Try to check your feet at the same time each day, such as when you get out of bed in the morning.    · Check the top of each foot. The tops of toes, back of the heel, and outer edge of the foot can get a lot of rubbing from poor-fitting shoes.    · Check the bottom of each foot. Daily wear and tear often leads to problems at pressure spots.    · Check the toes and nails. Fungal infections often occur between toes. Toenail problems can also be a sign of fungal infections or lead to breaks in the skin.    · Check your shoes, too. Loose objects inside a shoe can injure the foot. Use your hand to  feel inside your shoes for things like [place], loose stitching, or rough areas that could irritate your skin.        Diabetic Foot Care    Diabetes can lead to a number of different foot complications. Fortunately, most of these complications can be prevented with a little extra foot care. If diabetes is not well controlled, the high blood sugar can cause damage to blood vessels and result in poor circulation to the foot. When the skin does not get enough blood flow, it becomes prone to pressure sores and ulcers, which heal slowly.  High blood sugar can also damage nerves, interfering with the ability to feel pain and pressure. When you cant feel your foot normally, it is easy to injure your skin, bones and joints without knowing it. For these reasons diabetes increases the risk of fungal infections, bunions and ulcers. Deep ulcers can lead to bone infection. Gangrene is the most serious foot complication of diabetes. It usually occurs on the tips of the toes as blacked areas of skin. The black area is dead tissue. In severe cases, gangrene spreads to involve the entire toe, other toes and the entire foot. Foot or toe amputation may be required. Good foot care and blood sugar control can prevent this.    Home Care  1. Wear comfortable, proper fitting shoes.  2. Wash your feet daily with warm water and mild soap.  3. After drying, apply a moisturizing cream or lotion.  4. Check your feet daily for skin breaks, blisters, swelling, or redness. Look between your toes also.  5. Wear cotton socks and change them every day.  6. Trim toe nails carefully and do not cut your cuticles.  7. Strive to keep your blood sugar under control with a combination of medicines, diet and activity.  8. If you smoke and have diabetes, it is very important that you stop. Smoking reduces blood flow to your foot.  9. Avoid activities that increase your risk of foot injury:  · Do not walk barefoot.  · Do not use heating pads or hot water  bottles on your feet.  · Do not put your foot in a hot tub without first checking the temperature with your hand.  10) Schedule yearly foot exams.    Follow Up  with your doctor or as advised by our staff. Report any cut, puncture, scrape, other injury, blister, ingrown toenail or ulcer on your foot.    Get Prompt Medical Attention  if any of the following occur:  -- Open ulcer with pus draining from the wound  -- Increasing foot or leg pain  -- New areas of redness or swelling or tender areas of the foot    © 7101-1347 [employer]. [address] [place] [address]. All rights reserved. This information is not intended as a substitute for professional medical care. Always follow your healthcare professional's instructions.

## 2017-09-26 ENCOUNTER — PATIENT MESSAGE (OUTPATIENT)
Dept: INTERNAL MEDICINE | Facility: CLINIC | Age: 52
End: 2017-09-26

## 2017-09-26 DIAGNOSIS — E11.9 TYPE 2 DIABETES MELLITUS WITHOUT COMPLICATION, UNSPECIFIED LONG TERM INSULIN USE STATUS: Primary | Chronic | ICD-10-CM

## 2017-09-26 DIAGNOSIS — Z12.5 SCREENING FOR PROSTATE CANCER: ICD-10-CM

## 2017-09-27 ENCOUNTER — LAB VISIT (OUTPATIENT)
Dept: LAB | Facility: HOSPITAL | Age: 52
End: 2017-09-27
Attending: INTERNAL MEDICINE
Payer: COMMERCIAL

## 2017-09-27 DIAGNOSIS — Z00.00 ROUTINE PHYSICAL EXAMINATION: ICD-10-CM

## 2017-09-27 DIAGNOSIS — Z12.5 SCREENING FOR PROSTATE CANCER: ICD-10-CM

## 2017-09-27 DIAGNOSIS — E11.9 TYPE 2 DIABETES MELLITUS WITHOUT COMPLICATION, UNSPECIFIED LONG TERM INSULIN USE STATUS: Chronic | ICD-10-CM

## 2017-09-27 DIAGNOSIS — E11.9 TYPE 2 DIABETES MELLITUS WITHOUT COMPLICATION, WITHOUT LONG-TERM CURRENT USE OF INSULIN: Chronic | ICD-10-CM

## 2017-09-27 LAB
ALBUMIN SERPL BCP-MCNC: 3.6 G/DL
ALP SERPL-CCNC: 74 U/L
ALT SERPL W/O P-5'-P-CCNC: 49 U/L
ANION GAP SERPL CALC-SCNC: 11 MMOL/L
ANION GAP SERPL CALC-SCNC: 11 MMOL/L
AST SERPL-CCNC: 33 U/L
BASOPHILS # BLD AUTO: 0.02 K/UL
BASOPHILS NFR BLD: 0.3 %
BILIRUB SERPL-MCNC: 0.7 MG/DL
BUN SERPL-MCNC: 20 MG/DL
BUN SERPL-MCNC: 20 MG/DL
CALCIUM SERPL-MCNC: 9.3 MG/DL
CALCIUM SERPL-MCNC: 9.3 MG/DL
CHLORIDE SERPL-SCNC: 107 MMOL/L
CHLORIDE SERPL-SCNC: 107 MMOL/L
CHOLEST SERPL-MCNC: 107 MG/DL
CHOLEST/HDLC SERPL: 3.1 {RATIO}
CO2 SERPL-SCNC: 24 MMOL/L
CO2 SERPL-SCNC: 24 MMOL/L
COMPLEXED PSA SERPL-MCNC: 0.57 NG/ML
CREAT SERPL-MCNC: 1 MG/DL
CREAT SERPL-MCNC: 1 MG/DL
DIFFERENTIAL METHOD: NORMAL
EOSINOPHIL # BLD AUTO: 0.2 K/UL
EOSINOPHIL NFR BLD: 2.4 %
ERYTHROCYTE [DISTWIDTH] IN BLOOD BY AUTOMATED COUNT: 14.3 %
EST. GFR  (AFRICAN AMERICAN): >60 ML/MIN/1.73 M^2
EST. GFR  (AFRICAN AMERICAN): >60 ML/MIN/1.73 M^2
EST. GFR  (NON AFRICAN AMERICAN): >60 ML/MIN/1.73 M^2
EST. GFR  (NON AFRICAN AMERICAN): >60 ML/MIN/1.73 M^2
ESTIMATED AVG GLUCOSE: 131 MG/DL
GLUCOSE SERPL-MCNC: 97 MG/DL
GLUCOSE SERPL-MCNC: 97 MG/DL
HBA1C MFR BLD HPLC: 6.2 %
HCT VFR BLD AUTO: 46.3 %
HDLC SERPL-MCNC: 35 MG/DL
HDLC SERPL: 32.7 %
HGB BLD-MCNC: 14.9 G/DL
LDLC SERPL CALC-MCNC: 54 MG/DL
LYMPHOCYTES # BLD AUTO: 2.4 K/UL
LYMPHOCYTES NFR BLD: 30.9 %
MCH RBC QN AUTO: 28.3 PG
MCHC RBC AUTO-ENTMCNC: 32.2 G/DL
MCV RBC AUTO: 88 FL
MONOCYTES # BLD AUTO: 0.7 K/UL
MONOCYTES NFR BLD: 9.3 %
NEUTROPHILS # BLD AUTO: 4.4 K/UL
NEUTROPHILS NFR BLD: 56.8 %
NONHDLC SERPL-MCNC: 72 MG/DL
PLATELET # BLD AUTO: 274 K/UL
PMV BLD AUTO: 10.2 FL
POTASSIUM SERPL-SCNC: 3.9 MMOL/L
POTASSIUM SERPL-SCNC: 3.9 MMOL/L
PROT SERPL-MCNC: 7.1 G/DL
RBC # BLD AUTO: 5.26 M/UL
SODIUM SERPL-SCNC: 142 MMOL/L
SODIUM SERPL-SCNC: 142 MMOL/L
TRIGL SERPL-MCNC: 90 MG/DL
WBC # BLD AUTO: 7.82 K/UL

## 2017-09-27 PROCEDURE — 80053 COMPREHEN METABOLIC PANEL: CPT

## 2017-09-27 PROCEDURE — 85025 COMPLETE CBC W/AUTO DIFF WBC: CPT

## 2017-09-27 PROCEDURE — 83036 HEMOGLOBIN GLYCOSYLATED A1C: CPT

## 2017-09-27 PROCEDURE — 80061 LIPID PANEL: CPT

## 2017-09-27 PROCEDURE — 36415 COLL VENOUS BLD VENIPUNCTURE: CPT

## 2017-09-27 PROCEDURE — 84153 ASSAY OF PSA TOTAL: CPT

## 2017-09-29 ENCOUNTER — PATIENT MESSAGE (OUTPATIENT)
Dept: INTERNAL MEDICINE | Facility: CLINIC | Age: 52
End: 2017-09-29

## 2017-10-15 RX ORDER — LISINOPRIL 20 MG/1
TABLET ORAL
Qty: 90 TABLET | Refills: 0 | Status: SHIPPED | OUTPATIENT
Start: 2017-10-15 | End: 2018-01-10 | Stop reason: SDUPTHER

## 2017-11-01 ENCOUNTER — OFFICE VISIT (OUTPATIENT)
Dept: URGENT CARE | Facility: CLINIC | Age: 52
End: 2017-11-01
Payer: COMMERCIAL

## 2017-11-01 VITALS
SYSTOLIC BLOOD PRESSURE: 135 MMHG | DIASTOLIC BLOOD PRESSURE: 85 MMHG | BODY MASS INDEX: 35 KG/M2 | WEIGHT: 250 LBS | HEIGHT: 71 IN | TEMPERATURE: 97 F | HEART RATE: 59 BPM | RESPIRATION RATE: 18 BRPM | OXYGEN SATURATION: 97 %

## 2017-11-01 DIAGNOSIS — M25.511 ACUTE PAIN OF RIGHT SHOULDER: Primary | ICD-10-CM

## 2017-11-01 PROCEDURE — 99213 OFFICE O/P EST LOW 20 MIN: CPT | Mod: S$GLB,,, | Performed by: FAMILY MEDICINE

## 2017-11-01 NOTE — PROGRESS NOTES
"Subjective:       Patient ID: Geo Lang is a 52 y.o. male.    Vitals:  height is 5' 11" (1.803 m) and weight is 113.4 kg (250 lb). His oral temperature is 97.4 °F (36.3 °C). His blood pressure is 135/85 and his pulse is 59 (abnormal). His respiration is 18 and oxygen saturation is 97%.     Chief Complaint: Shoulder Pain (Intermittent right shoulder pain since yesterday morning)    Shoulder Pain    The pain is present in the right shoulder. This is a new problem. There has been no history of extremity trauma. The problem occurs intermittently. The problem has been unchanged. The quality of the pain is described as sharp. Pertinent negatives include no fever or headaches. He has tried chondroitin for the symptoms. His past medical history is significant for diabetes.     Review of Systems   Constitution: Negative for chills and fever.   HENT: Negative for sore throat.    Eyes: Negative for blurred vision.   Cardiovascular: Negative for chest pain.   Respiratory: Negative for shortness of breath.    Skin: Negative for rash.   Musculoskeletal: Positive for joint pain. Negative for back pain.   Gastrointestinal: Negative for abdominal pain, diarrhea, nausea and vomiting.   Neurological: Negative for headaches.   Psychiatric/Behavioral: The patient is not nervous/anxious.        Objective:      Physical Exam   Constitutional: He appears well-developed and well-nourished. No distress.   Cardiovascular: Normal rate and regular rhythm.    Pulmonary/Chest: Effort normal and breath sounds normal.   Musculoskeletal:        Right shoulder: He exhibits tenderness and pain. He exhibits normal range of motion, no bony tenderness, no swelling, no effusion, no crepitus, no deformity, no laceration, no spasm, normal pulse and normal strength.        Right elbow: Normal.       Cervical back: Normal.   Skin: No rash noted.   Vitals reviewed.      Assessment:       1. Acute pain of right shoulder        Plan:         Acute pain of " right shoulder    no injury ,probable rotator cuff tendonitis , discussed otc meds and exercises and ortho fup prn

## 2017-11-04 ENCOUNTER — TELEPHONE (OUTPATIENT)
Dept: URGENT CARE | Facility: CLINIC | Age: 52
End: 2017-11-04

## 2018-01-09 ENCOUNTER — PATIENT MESSAGE (OUTPATIENT)
Dept: ENDOCRINOLOGY | Facility: CLINIC | Age: 53
End: 2018-01-09

## 2018-01-10 RX ORDER — LISINOPRIL 20 MG/1
TABLET ORAL
Qty: 90 TABLET | Refills: 0 | Status: SHIPPED | OUTPATIENT
Start: 2018-01-10 | End: 2018-04-03

## 2018-01-19 ENCOUNTER — PATIENT MESSAGE (OUTPATIENT)
Dept: INTERNAL MEDICINE | Facility: CLINIC | Age: 53
End: 2018-01-19

## 2018-01-19 DIAGNOSIS — E11.9 TYPE 2 DIABETES MELLITUS WITHOUT COMPLICATION, UNSPECIFIED LONG TERM INSULIN USE STATUS: Primary | Chronic | ICD-10-CM

## 2018-01-31 ENCOUNTER — OFFICE VISIT (OUTPATIENT)
Dept: INTERNAL MEDICINE | Facility: CLINIC | Age: 53
End: 2018-01-31
Payer: COMMERCIAL

## 2018-01-31 ENCOUNTER — LAB VISIT (OUTPATIENT)
Dept: LAB | Facility: HOSPITAL | Age: 53
End: 2018-01-31
Attending: INTERNAL MEDICINE
Payer: COMMERCIAL

## 2018-01-31 VITALS
HEART RATE: 91 BPM | BODY MASS INDEX: 35.74 KG/M2 | SYSTOLIC BLOOD PRESSURE: 120 MMHG | WEIGHT: 255.31 LBS | OXYGEN SATURATION: 97 % | DIASTOLIC BLOOD PRESSURE: 82 MMHG | HEIGHT: 71 IN

## 2018-01-31 DIAGNOSIS — R10.9 LEFT FLANK PAIN: ICD-10-CM

## 2018-01-31 DIAGNOSIS — E11.9 TYPE 2 DIABETES MELLITUS WITHOUT COMPLICATION, UNSPECIFIED LONG TERM INSULIN USE STATUS: Chronic | ICD-10-CM

## 2018-01-31 DIAGNOSIS — R00.2 PALPITATIONS: ICD-10-CM

## 2018-01-31 DIAGNOSIS — R10.9 ABDOMINAL PAIN, UNSPECIFIED ABDOMINAL LOCATION: ICD-10-CM

## 2018-01-31 DIAGNOSIS — R00.0 TACHYCARDIA: ICD-10-CM

## 2018-01-31 DIAGNOSIS — E78.5 HYPERLIPIDEMIA, UNSPECIFIED HYPERLIPIDEMIA TYPE: Chronic | ICD-10-CM

## 2018-01-31 DIAGNOSIS — K21.9 GASTROESOPHAGEAL REFLUX DISEASE WITHOUT ESOPHAGITIS: ICD-10-CM

## 2018-01-31 DIAGNOSIS — M54.50 BACK PAIN OF THORACOLUMBAR REGION: ICD-10-CM

## 2018-01-31 DIAGNOSIS — M54.6 BACK PAIN OF THORACOLUMBAR REGION: ICD-10-CM

## 2018-01-31 DIAGNOSIS — Z00.00 ROUTINE PHYSICAL EXAMINATION: Primary | ICD-10-CM

## 2018-01-31 DIAGNOSIS — G47.33 OSA (OBSTRUCTIVE SLEEP APNEA): Chronic | ICD-10-CM

## 2018-01-31 DIAGNOSIS — I10 ESSENTIAL HYPERTENSION: Chronic | ICD-10-CM

## 2018-01-31 LAB
BACTERIA #/AREA URNS AUTO: NORMAL /HPF
BILIRUB UR QL STRIP: NEGATIVE
CLARITY UR REFRACT.AUTO: CLEAR
COLOR UR AUTO: YELLOW
GLUCOSE UR QL STRIP: ABNORMAL
HGB UR QL STRIP: NEGATIVE
KETONES UR QL STRIP: NEGATIVE
LEUKOCYTE ESTERASE UR QL STRIP: NEGATIVE
MICROSCOPIC COMMENT: NORMAL
NITRITE UR QL STRIP: NEGATIVE
PH UR STRIP: 5 [PH] (ref 5–8)
PROT UR QL STRIP: NEGATIVE
RBC #/AREA URNS AUTO: 0 /HPF (ref 0–4)
SP GR UR STRIP: 1.02 (ref 1–1.03)
URN SPEC COLLECT METH UR: ABNORMAL
UROBILINOGEN UR STRIP-ACNC: NEGATIVE EU/DL
WBC #/AREA URNS AUTO: 1 /HPF (ref 0–5)
YEAST UR QL AUTO: NORMAL

## 2018-01-31 PROCEDURE — 87086 URINE CULTURE/COLONY COUNT: CPT

## 2018-01-31 PROCEDURE — 99396 PREV VISIT EST AGE 40-64: CPT | Mod: S$GLB,,, | Performed by: INTERNAL MEDICINE

## 2018-01-31 PROCEDURE — 81001 URINALYSIS AUTO W/SCOPE: CPT

## 2018-01-31 PROCEDURE — 99999 PR PBB SHADOW E&M-EST. PATIENT-LVL III: CPT | Mod: PBBFAC,,, | Performed by: INTERNAL MEDICINE

## 2018-02-01 ENCOUNTER — PATIENT MESSAGE (OUTPATIENT)
Dept: INTERNAL MEDICINE | Facility: CLINIC | Age: 53
End: 2018-02-01

## 2018-02-01 LAB — BACTERIA UR CULT: NORMAL

## 2018-02-02 ENCOUNTER — HOSPITAL ENCOUNTER (OUTPATIENT)
Dept: RADIOLOGY | Facility: HOSPITAL | Age: 53
Discharge: HOME | End: 2018-02-02
Attending: INTERNAL MEDICINE
Payer: COMMERCIAL

## 2018-02-02 DIAGNOSIS — K21.9 GASTROESOPHAGEAL REFLUX DISEASE WITHOUT ESOPHAGITIS: ICD-10-CM

## 2018-02-02 DIAGNOSIS — R10.9 ABDOMINAL PAIN, UNSPECIFIED ABDOMINAL LOCATION: ICD-10-CM

## 2018-02-02 DIAGNOSIS — R10.9 LEFT FLANK PAIN: ICD-10-CM

## 2018-02-02 PROCEDURE — 76700 US EXAM ABDOM COMPLETE: CPT | Mod: 26,,, | Performed by: RADIOLOGY

## 2018-02-02 PROCEDURE — 76700 US EXAM ABDOM COMPLETE: CPT | Mod: TC,PO

## 2018-02-03 ENCOUNTER — PATIENT MESSAGE (OUTPATIENT)
Dept: INTERNAL MEDICINE | Facility: CLINIC | Age: 53
End: 2018-02-03

## 2018-02-22 ENCOUNTER — PATIENT MESSAGE (OUTPATIENT)
Dept: INTERNAL MEDICINE | Facility: CLINIC | Age: 53
End: 2018-02-22

## 2018-02-27 ENCOUNTER — TELEPHONE (OUTPATIENT)
Dept: INTERNAL MEDICINE | Facility: CLINIC | Age: 53
End: 2018-02-27

## 2018-02-27 NOTE — TELEPHONE ENCOUNTER
[facility] home delivery delivery pharmacy is requesting One touch velerio flex system. Please advise

## 2018-03-06 ENCOUNTER — PATIENT MESSAGE (OUTPATIENT)
Dept: INTERNAL MEDICINE | Facility: CLINIC | Age: 53
End: 2018-03-06

## 2018-04-03 RX ORDER — LISINOPRIL 20 MG/1
TABLET ORAL
Qty: 90 TABLET | Refills: 0 | Status: SHIPPED | OUTPATIENT
Start: 2018-04-03 | End: 2018-07-10 | Stop reason: SDUPTHER

## 2018-06-01 ENCOUNTER — PATIENT MESSAGE (OUTPATIENT)
Dept: INTERNAL MEDICINE | Facility: CLINIC | Age: 53
End: 2018-06-01

## 2018-06-01 RX ORDER — ONDANSETRON HYDROCHLORIDE 8 MG/1
TABLET, FILM COATED ORAL
Qty: 12 TABLET | Refills: 0 | Status: SHIPPED | OUTPATIENT
Start: 2018-06-01 | End: 2018-06-01 | Stop reason: SDUPTHER

## 2018-06-01 RX ORDER — ONDANSETRON HYDROCHLORIDE 8 MG/1
TABLET, FILM COATED ORAL
Qty: 12 TABLET | Refills: 1 | Status: SHIPPED | OUTPATIENT
Start: 2018-06-01 | End: 2020-05-11 | Stop reason: SDUPTHER

## 2018-07-10 RX ORDER — LISINOPRIL 20 MG/1
TABLET ORAL
Qty: 90 TABLET | Refills: 0 | Status: SHIPPED | OUTPATIENT
Start: 2018-07-10 | End: 2018-08-07 | Stop reason: SDUPTHER

## 2018-07-23 NOTE — TELEPHONE ENCOUNTER
"----- Message from Deena Turner sent at 7/20/2018  4:46 PM CDT -----  RX request - refill or new RX.  Is this a refill or new RX:  Refill   RX name and strength: empagliflozin-metformin (SYNJARDY) 5-1,000 mg Tab  Directions:   Is this a 30 day or 90 day RX:  30 day   Local pharmacy or mail order pharmacy:    Pharmacy name and phone # (DON'T enter "on file" or "in chart"): [facility] [phone] (Phone)   Comments:        "

## 2018-07-31 ENCOUNTER — TELEPHONE (OUTPATIENT)
Dept: PODIATRY | Facility: CLINIC | Age: 53
End: 2018-07-31

## 2018-07-31 NOTE — TELEPHONE ENCOUNTER
----- Message from Cielo Rosario sent at 7/31/2018  2:05 PM CDT -----  Contact: self   Patient Requesting Sooner Appointment.     Reason for sooner appt.:f/u appt  When is the first available appointment?n/a  Communication Preference: @home#  Additional Information:

## 2018-08-07 ENCOUNTER — LAB VISIT (OUTPATIENT)
Dept: LAB | Facility: HOSPITAL | Age: 53
End: 2018-08-07
Attending: INTERNAL MEDICINE
Payer: COMMERCIAL

## 2018-08-07 ENCOUNTER — OFFICE VISIT (OUTPATIENT)
Dept: INTERNAL MEDICINE | Facility: CLINIC | Age: 53
End: 2018-08-07
Payer: COMMERCIAL

## 2018-08-07 VITALS
HEART RATE: 70 BPM | OXYGEN SATURATION: 98 % | WEIGHT: 257.5 LBS | SYSTOLIC BLOOD PRESSURE: 108 MMHG | BODY MASS INDEX: 35.91 KG/M2 | DIASTOLIC BLOOD PRESSURE: 68 MMHG

## 2018-08-07 DIAGNOSIS — E78.5 HYPERLIPIDEMIA, UNSPECIFIED HYPERLIPIDEMIA TYPE: Chronic | ICD-10-CM

## 2018-08-07 DIAGNOSIS — S39.012A STRAIN OF LUMBAR REGION, INITIAL ENCOUNTER: ICD-10-CM

## 2018-08-07 DIAGNOSIS — K21.9 LPRD (LARYNGOPHARYNGEAL REFLUX DISEASE): ICD-10-CM

## 2018-08-07 DIAGNOSIS — G47.33 OSA (OBSTRUCTIVE SLEEP APNEA): Chronic | ICD-10-CM

## 2018-08-07 DIAGNOSIS — I10 ESSENTIAL HYPERTENSION: Chronic | ICD-10-CM

## 2018-08-07 DIAGNOSIS — S80.811A ABRASION OF RIGHT LOWER EXTREMITY, INITIAL ENCOUNTER: ICD-10-CM

## 2018-08-07 DIAGNOSIS — E11.9 TYPE 2 DIABETES MELLITUS WITHOUT COMPLICATION, WITHOUT LONG-TERM CURRENT USE OF INSULIN: ICD-10-CM

## 2018-08-07 DIAGNOSIS — E11.9 TYPE 2 DIABETES MELLITUS WITHOUT COMPLICATION, UNSPECIFIED WHETHER LONG TERM INSULIN USE: Chronic | ICD-10-CM

## 2018-08-07 DIAGNOSIS — K21.9 GASTROESOPHAGEAL REFLUX DISEASE WITHOUT ESOPHAGITIS: ICD-10-CM

## 2018-08-07 DIAGNOSIS — E11.9 TYPE 2 DIABETES MELLITUS WITHOUT COMPLICATION, UNSPECIFIED WHETHER LONG TERM INSULIN USE: Primary | Chronic | ICD-10-CM

## 2018-08-07 LAB
ANION GAP SERPL CALC-SCNC: 9 MMOL/L
BUN SERPL-MCNC: 18 MG/DL
CALCIUM SERPL-MCNC: 9.9 MG/DL
CHLORIDE SERPL-SCNC: 106 MMOL/L
CO2 SERPL-SCNC: 27 MMOL/L
CREAT SERPL-MCNC: 1.1 MG/DL
EST. GFR  (AFRICAN AMERICAN): >60 ML/MIN/1.73 M^2
EST. GFR  (NON AFRICAN AMERICAN): >60 ML/MIN/1.73 M^2
ESTIMATED AVG GLUCOSE: 137 MG/DL
GLUCOSE SERPL-MCNC: 119 MG/DL
HBA1C MFR BLD HPLC: 6.4 %
POTASSIUM SERPL-SCNC: 4.5 MMOL/L
SODIUM SERPL-SCNC: 142 MMOL/L

## 2018-08-07 PROCEDURE — 80048 BASIC METABOLIC PNL TOTAL CA: CPT

## 2018-08-07 PROCEDURE — 36415 COLL VENOUS BLD VENIPUNCTURE: CPT

## 2018-08-07 PROCEDURE — 3008F BODY MASS INDEX DOCD: CPT | Mod: CPTII,S$GLB,, | Performed by: INTERNAL MEDICINE

## 2018-08-07 PROCEDURE — 3078F DIAST BP <80 MM HG: CPT | Mod: CPTII,S$GLB,, | Performed by: INTERNAL MEDICINE

## 2018-08-07 PROCEDURE — 83036 HEMOGLOBIN GLYCOSYLATED A1C: CPT

## 2018-08-07 PROCEDURE — 3074F SYST BP LT 130 MM HG: CPT | Mod: CPTII,S$GLB,, | Performed by: INTERNAL MEDICINE

## 2018-08-07 PROCEDURE — 99999 PR PBB SHADOW E&M-EST. PATIENT-LVL III: CPT | Mod: PBBFAC,,, | Performed by: INTERNAL MEDICINE

## 2018-08-07 PROCEDURE — 99214 OFFICE O/P EST MOD 30 MIN: CPT | Mod: S$GLB,,, | Performed by: INTERNAL MEDICINE

## 2018-08-07 PROCEDURE — 3044F HG A1C LEVEL LT 7.0%: CPT | Mod: CPTII,S$GLB,, | Performed by: INTERNAL MEDICINE

## 2018-08-07 RX ORDER — ALPRAZOLAM 1 MG/1
1 TABLET ORAL 2 TIMES DAILY
Qty: 15 TABLET | Refills: 2 | Status: SHIPPED | OUTPATIENT
Start: 2018-08-07 | End: 2020-05-11 | Stop reason: SDUPTHER

## 2018-08-07 RX ORDER — MUPIROCIN CALCIUM 20 MG/G
CREAM TOPICAL 3 TIMES DAILY
Qty: 15 G | Refills: 1 | Status: SHIPPED | OUTPATIENT
Start: 2018-08-07 | End: 2020-05-11 | Stop reason: SDUPTHER

## 2018-08-07 RX ORDER — LISINOPRIL 20 MG/1
20 TABLET ORAL DAILY
Qty: 90 TABLET | Refills: 4 | Status: SHIPPED | OUTPATIENT
Start: 2018-08-07 | End: 2018-11-13

## 2018-08-07 RX ORDER — HYDROCHLOROTHIAZIDE 12.5 MG/1
12.5 CAPSULE ORAL DAILY
Qty: 90 CAPSULE | Refills: 3 | Status: SHIPPED | OUTPATIENT
Start: 2018-08-07 | End: 2019-01-29 | Stop reason: ALTCHOICE

## 2018-08-07 RX ORDER — ATORVASTATIN CALCIUM 20 MG/1
20 TABLET, FILM COATED ORAL DAILY
Qty: 90 TABLET | Refills: 11 | Status: SHIPPED | OUTPATIENT
Start: 2018-08-07 | End: 2019-09-10 | Stop reason: SDUPTHER

## 2018-08-07 RX ORDER — ATENOLOL 50 MG/1
50 TABLET ORAL DAILY
Qty: 90 TABLET | Refills: 3 | Status: SHIPPED | OUTPATIENT
Start: 2018-08-07 | End: 2018-12-17

## 2018-08-07 NOTE — PROGRESS NOTES
Subjective:       Patient ID: Geo Lang is a 52 y.o. male.    Chief Complaint: Follow-up    HPI:  Patient comes in for interval follow-up of labs.  He needed A1c and chemistry done.  He strained his right low back this weekend moving some furniture and caused an abrasion on the right shin when he scraped it on the bedpost.  Minimal scabbing no drainage no tenderness. He can feel some spasms in the right low back and try the Flexeril but it caused too much sedation to take during the day.  He asked for refill on alprazolam.  He uses this infrequently for anxiety.  His last prescription was in 2016 and he did not finish it but worries it is no longer.  He has Podiatry and eye exams already scheduled for diabetes follow-up.      Review of Systems   Constitutional: Negative for chills, fatigue, fever and unexpected weight change.   HENT: Negative for nosebleeds and trouble swallowing.    Eyes: Negative for pain and visual disturbance.   Respiratory: Negative for cough, shortness of breath and wheezing.    Cardiovascular: Negative for chest pain and palpitations.   Gastrointestinal: Negative for abdominal pain, constipation, diarrhea, nausea and vomiting.   Genitourinary: Negative for difficulty urinating and hematuria.   Musculoskeletal: Positive for arthralgias and back pain. Negative for neck pain.   Skin: Positive for wound (Right shin). Negative for rash.   Neurological: Negative for dizziness and headaches.   Hematological: Does not bruise/bleed easily.   Psychiatric/Behavioral: Negative for dysphoric mood, sleep disturbance and suicidal ideas.       Objective:      Physical Exam   Constitutional: He is oriented to person, place, and time. He appears well-developed and well-nourished. No distress.   HENT:   Head: Normocephalic and atraumatic.   Right Ear: External ear normal.   Left Ear: External ear normal.   Mouth/Throat: Oropharynx is clear and moist. No oropharyngeal exudate.   TM's clear, pharynx clear    Eyes: Conjunctivae and EOM are normal. Pupils are equal, round, and reactive to light. No scleral icterus.   Neck: Normal range of motion. Neck supple. No thyromegaly present.   No supraclavicular nodes palpated   Cardiovascular: Normal rate, regular rhythm and normal heart sounds.    No murmur heard.  Pulmonary/Chest: Effort normal and breath sounds normal. He has no wheezes.   Abdominal: Soft. Bowel sounds are normal. He exhibits no mass. There is no tenderness.   Musculoskeletal: He exhibits tenderness (Area muscle spasm right lower lumbar region). He exhibits no edema.   Lymphadenopathy:     He has no cervical adenopathy.   Neurological: He is alert and oriented to person, place, and time.   Skin: No rash noted. No erythema. No pallor.   Small nickel size abrasion right shin.  No redness or drainage.  Crust in place     Psychiatric: He has a normal mood and affect. His behavior is normal.       Assessment:       1. Type 2 diabetes mellitus without complication, unspecified whether long term insulin use    2. Type 2 diabetes mellitus without complication, without long-term current use of insulin    3. Essential hypertension    4. Gastroesophageal reflux disease without esophagitis    5. Hyperlipidemia, unspecified hyperlipidemia type    6. LPRD (laryngopharyngeal reflux disease)    7. CALVIN (obstructive sleep apnea)    8. Abrasion of right lower extremity, initial encounter    9. Strain of lumbar region, initial encounter        Plan:       Geo was seen today for follow-up.    Diagnoses and all orders for this visit:    Type 2 diabetes mellitus without complication, unspecified whether long term insulin use  -     Hemoglobin A1c; Future  -     Basic metabolic panel; Future    Type 2 diabetes mellitus without complication, without long-term current use of insulin  -     atorvastatin (LIPITOR) 20 MG tablet; Take 1 tablet (20 mg total) by mouth once daily.    Essential hypertension  -     hydroCHLOROthiazide  (MICROZIDE) 12.5 mg capsule; Take 1 capsule (12.5 mg total) by mouth once daily.    Gastroesophageal reflux disease without esophagitis    Hyperlipidemia, unspecified hyperlipidemia type    LPRD (laryngopharyngeal reflux disease)    CALVIN (obstructive sleep apnea)    Abrasion of right lower extremity, initial encounter  Comments:  scraped leg on bed post. mild scab. no drainage.     Strain of lumbar region, initial encounter    Other orders  -     ALPRAZolam (XANAX) 1 MG tablet; Take 1 tablet (1 mg total) by mouth 2 (two) times daily.  -     atenolol (TENORMIN) 50 MG tablet; Take 1 tablet (50 mg total) by mouth once daily.  -     lisinopril (PRINIVIL,ZESTRIL) 20 MG tablet; Take 1 tablet (20 mg total) by mouth once daily.  -     mupirocin calcium 2% (BACTROBAN) 2 % cream; Apply topically 3 (three) times daily.

## 2018-08-15 ENCOUNTER — OFFICE VISIT (OUTPATIENT)
Dept: OPTOMETRY | Facility: CLINIC | Age: 53
End: 2018-08-15
Payer: COMMERCIAL

## 2018-08-15 DIAGNOSIS — E11.9 DIABETES MELLITUS TYPE 2 WITHOUT RETINOPATHY: Primary | ICD-10-CM

## 2018-08-15 PROCEDURE — 92014 COMPRE OPH EXAM EST PT 1/>: CPT | Mod: S$GLB,,, | Performed by: OPTOMETRIST

## 2018-08-15 PROCEDURE — 99999 PR PBB SHADOW E&M-EST. PATIENT-LVL III: CPT | Mod: PBBFAC,,, | Performed by: OPTOMETRIST

## 2018-08-16 ENCOUNTER — PATIENT MESSAGE (OUTPATIENT)
Dept: PULMONOLOGY | Facility: CLINIC | Age: 53
End: 2018-08-16

## 2018-08-31 ENCOUNTER — PATIENT MESSAGE (OUTPATIENT)
Dept: INTERNAL MEDICINE | Facility: CLINIC | Age: 53
End: 2018-08-31

## 2018-08-31 ENCOUNTER — TELEPHONE (OUTPATIENT)
Dept: GASTROENTEROLOGY | Facility: CLINIC | Age: 53
End: 2018-08-31

## 2018-08-31 ENCOUNTER — OFFICE VISIT (OUTPATIENT)
Dept: INTERNAL MEDICINE | Facility: CLINIC | Age: 53
End: 2018-08-31
Payer: COMMERCIAL

## 2018-08-31 VITALS
OXYGEN SATURATION: 95 % | DIASTOLIC BLOOD PRESSURE: 78 MMHG | WEIGHT: 254 LBS | SYSTOLIC BLOOD PRESSURE: 130 MMHG | BODY MASS INDEX: 35.56 KG/M2 | HEIGHT: 71 IN | HEART RATE: 71 BPM

## 2018-08-31 DIAGNOSIS — K59.00 CONSTIPATION, UNSPECIFIED CONSTIPATION TYPE: Primary | ICD-10-CM

## 2018-08-31 DIAGNOSIS — R10.9 ABDOMINAL CRAMPS: ICD-10-CM

## 2018-08-31 DIAGNOSIS — E11.9 TYPE 2 DIABETES MELLITUS WITHOUT COMPLICATION, UNSPECIFIED WHETHER LONG TERM INSULIN USE: Chronic | ICD-10-CM

## 2018-08-31 DIAGNOSIS — K62.5 ANAL BLEEDING: ICD-10-CM

## 2018-08-31 PROCEDURE — 3044F HG A1C LEVEL LT 7.0%: CPT | Mod: CPTII,S$GLB,, | Performed by: INTERNAL MEDICINE

## 2018-08-31 PROCEDURE — 3008F BODY MASS INDEX DOCD: CPT | Mod: CPTII,S$GLB,, | Performed by: INTERNAL MEDICINE

## 2018-08-31 PROCEDURE — 3075F SYST BP GE 130 - 139MM HG: CPT | Mod: CPTII,S$GLB,, | Performed by: INTERNAL MEDICINE

## 2018-08-31 PROCEDURE — 3078F DIAST BP <80 MM HG: CPT | Mod: CPTII,S$GLB,, | Performed by: INTERNAL MEDICINE

## 2018-08-31 PROCEDURE — 99999 PR PBB SHADOW E&M-EST. PATIENT-LVL III: CPT | Mod: PBBFAC,,, | Performed by: INTERNAL MEDICINE

## 2018-08-31 PROCEDURE — 99213 OFFICE O/P EST LOW 20 MIN: CPT | Mod: S$GLB,,, | Performed by: INTERNAL MEDICINE

## 2018-08-31 NOTE — TELEPHONE ENCOUNTER
----- Message from Diana Enriquez sent at 8/31/2018  9:18 AM CDT -----  Contact: Self- [phone]  Dwaine- pt called to schedule a np appt- previously seen by him in the past- suffering with diarrhea bloating and gas- fresh blood in stool- please contact pt at [phone]

## 2018-08-31 NOTE — PROGRESS NOTES
Subjective:       Patient ID: Geo Lang is a 52 y.o. male.    Chief Complaint: Abdominal Pain and Constipation    HPI:  Patient with history of diabetes, obesity, hypertension complains of constipation.  He had some significant abdominal cramps and some red blood on the tissue after wiping this morning and decided to make an appointment.  He has a history of Nissen fundoplication for acid reflux a.  He has an appointment with Gastroenterology in a few weeks.  No blood in the stool.  No diarrhea.  He has been a little dehydrated lately and does not take any type of fiber or stool softener.  He is up-to-date with his colonoscopy.      Review of Systems   Constitutional: Negative for chills, fatigue, fever and unexpected weight change.   HENT: Negative for nosebleeds and trouble swallowing.    Eyes: Negative for pain and visual disturbance.   Respiratory: Negative for cough, shortness of breath and wheezing.    Cardiovascular: Negative for chest pain and palpitations.   Gastrointestinal: Positive for abdominal pain (Crampy pain before taking psych treat a magnesium then it resolved after having large volume stool), anal bleeding ( blood on the tissue after wiping x1) and constipation. Negative for diarrhea, nausea, rectal pain and vomiting.   Genitourinary: Negative for difficulty urinating and hematuria.   Musculoskeletal: Negative for neck pain.   Skin: Negative for rash.   Neurological: Negative for dizziness and headaches.   Hematological: Does not bruise/bleed easily.   Psychiatric/Behavioral: Negative for dysphoric mood, sleep disturbance and suicidal ideas.       Objective:      Physical Exam   Constitutional: He is oriented to person, place, and time. He appears well-developed and well-nourished. No distress.   HENT:   Head: Normocephalic and atraumatic.   Right Ear: External ear normal.   Left Ear: External ear normal.   Mouth/Throat: Oropharynx is clear and moist. No oropharyngeal exudate.   TM's clear,  pharynx clear   Eyes: Conjunctivae and EOM are normal. Pupils are equal, round, and reactive to light. No scleral icterus.   Neck: Normal range of motion. Neck supple. No thyromegaly present.   No supraclavicular nodes palpated   Cardiovascular: Normal rate, regular rhythm and normal heart sounds.   No murmur heard.  Pulmonary/Chest: Effort normal and breath sounds normal. He has no wheezes.   Abdominal: Soft. Bowel sounds are normal. He exhibits no mass. There is no tenderness.   Genitourinary: Prostate normal. Rectal exam shows guaiac negative stool.   Genitourinary Comments: Tiny external hemorrhoids.  No evidence of bleeding.  Stool heme-negative, control positive   Musculoskeletal: He exhibits no edema.   Lymphadenopathy:     He has no cervical adenopathy.   Neurological: He is alert and oriented to person, place, and time.   Skin: No rash noted. No erythema. No pallor.   Psychiatric: He has a normal mood and affect. His behavior is normal.       Assessment:       1. Constipation, unspecified constipation type    2. Abdominal cramps    3. Anal bleeding    4. Type 2 diabetes mellitus without complication, unspecified whether long term insulin use        Plan:       Geo was seen today for abdominal pain and constipation.    Diagnoses and all orders for this visit:    Constipation, unspecified constipation type    Abdominal cramps    Anal bleeding    Type 2 diabetes mellitus without complication, unspecified whether long term insulin use        increased water, fiber.  I suspect this is primarily a bout of constipation.  Keep GI consult as planned

## 2018-09-13 ENCOUNTER — TELEPHONE (OUTPATIENT)
Dept: GASTROENTEROLOGY | Facility: CLINIC | Age: 53
End: 2018-09-13

## 2018-09-13 ENCOUNTER — TELEPHONE (OUTPATIENT)
Dept: ENDOSCOPY | Facility: HOSPITAL | Age: 53
End: 2018-09-13

## 2018-09-13 ENCOUNTER — OFFICE VISIT (OUTPATIENT)
Dept: GASTROENTEROLOGY | Facility: CLINIC | Age: 53
End: 2018-09-13
Payer: COMMERCIAL

## 2018-09-13 ENCOUNTER — PATIENT MESSAGE (OUTPATIENT)
Dept: ENDOSCOPY | Facility: HOSPITAL | Age: 53
End: 2018-09-13

## 2018-09-13 VITALS
DIASTOLIC BLOOD PRESSURE: 88 MMHG | HEIGHT: 71 IN | WEIGHT: 253.5 LBS | HEART RATE: 58 BPM | BODY MASS INDEX: 35.49 KG/M2 | SYSTOLIC BLOOD PRESSURE: 131 MMHG

## 2018-09-13 DIAGNOSIS — R06.6 HICCUPS: ICD-10-CM

## 2018-09-13 DIAGNOSIS — R19.7 DIARRHEA, UNSPECIFIED TYPE: Primary | ICD-10-CM

## 2018-09-13 PROCEDURE — 99999 PR PBB SHADOW E&M-EST. PATIENT-LVL III: CPT | Mod: PBBFAC,,, | Performed by: INTERNAL MEDICINE

## 2018-09-13 PROCEDURE — 3075F SYST BP GE 130 - 139MM HG: CPT | Mod: CPTII,S$GLB,, | Performed by: INTERNAL MEDICINE

## 2018-09-13 PROCEDURE — 3008F BODY MASS INDEX DOCD: CPT | Mod: CPTII,S$GLB,, | Performed by: INTERNAL MEDICINE

## 2018-09-13 PROCEDURE — 99204 OFFICE O/P NEW MOD 45 MIN: CPT | Mod: S$GLB,,, | Performed by: INTERNAL MEDICINE

## 2018-09-13 PROCEDURE — 3079F DIAST BP 80-89 MM HG: CPT | Mod: CPTII,S$GLB,, | Performed by: INTERNAL MEDICINE

## 2018-09-13 NOTE — LETTER
September 13, 2018      Pj Velasco MD  [address]  [facility] [address]           [facility]  [address]  [facility] [address]  Phone: [phone]  Fax: [phone]          Patient: Geo Lang   MR Number: [id number]   YOB: 1965   Date of Visit: 9/13/2018       Dear Dr. Pj Velasco:    Thank you for referring Geo Lang to me for evaluation. Attached you will find relevant portions of my assessment and plan of care.    If you have questions, please do not hesitate to call me. I look forward to following Geo Lang along with you.    Sincerely,    Sanford Isidro MD    Enclosure  CC:  No Recipients    If you would like to receive this communication electronically, please contact [email or web address] or [phone] to request more information on [facility] access.    For providers and/or their staff who would like to refer a patient to [facility], please contact us through our one-stop-shop provider referral line, [facility], at [phone].    If you feel you have received this communication in error or would no longer like to receive these types of communications, please e-mail [email or web address]

## 2018-09-13 NOTE — PROGRESS NOTES
REASON FOR VISIT:  Changes in bowel pattern and intermittent hiccups.    HISTORY OF PRESENT ILLNESS:  Mr. Lang is a 52-year-old gentleman who   underwent fundoplication in 2009 for gastroesophageal reflux, has been doing   well with no significant acid regurgitation or heartburn, but has been noticing   intermittent hiccups.  Denies any dysphagia, odynophagia.  Recently over the   past month, he has been noticing some changes in his bowel patterns with stools   being loose at times and changing to hard pellet-like stools.  At other times,   he also noticed a few bouts of fresh bright red blood per rectum with bowel   movements.  He denies any fecal incontinence or fecal impaction.  He has never   had any bowel movements during sleep or any urgency to bowel movements.  He   denies any changes in medication changes.  No recent changes in stress levels.    No recent antibiotic use.  No recent travel.  His diet has been stable.  No   changes in body weight.  His level of activity has not changed as well.    PAST MEDICAL, SURGICAL, SOCIAL AND FAMILY HISTORY:  Reviewed.    MEDICATIONS AND ALLERGIES:  Reviewed.    REVIEW OF SYSTEMS:  CONSTITUTIONAL:  No fever, no chills, no weight loss.  Appetite is normal.  EYES:  No visual changes or red eyes.  ENT:  No odynophagia or hoarseness of voice.  CARDIOVASCULAR:  No angina or palpitation.  RESPIRATORY:  No shortness of breath or wheezing.  GENITOURINARY:  No dysuria or frequency.  MUSCULOSKELETAL:  No myalgia, no arthralgia.  SKIN:  No pruritus or eczema.  NEUROLOGIC:  No headache, no seizures.  PSYCHIATRIC:  No anxiety or depression.  GASTROINTESTINAL:  See HPI.    PHYSICAL EXAMINATION:  VITAL SIGNS:  See EPIC.  GENERAL:  Awake, alert and oriented x3, no acute distress.  NECK:  Supple, no carotid bruit.  No cervical adenopathy.  ABDOMEN:  Obese, protuberant, soft, nondistended, nontender.  No masses   palpable.  No organomegaly appreciated secondary to body habitus.  Bowel  sounds   are hyperactive.  No abdominal bruits heard.  EYES:  Conjunctivae anicteric.  ENT:  Oropharynx, mucosa moist.  Mallampati 2.  CARDIOVASCULAR:  S1, S2 normal.  RESPIRATORY:  Bilateral air entry equal.  SKIN:  No palmar erythema or spider angiomata.  NEUROLOGIC:  No asterixis.  PSYCHIATRY:  Affect appropriate.  LOWER EXTREMITY:  No pedal edema.    Recent labs and ultrasound imaging reviewed.    IMPRESSION:  1.  Recent changes in bowel pattern with blood in the stool.  2.  Intermittent hiccups in a patient with history of fundoplication and   gastroesophageal reflux.    RECOMMENDATION:  1.  Proceed with EGD and colonoscopy.  2.  Check CBC, free T4 and TSH.  We will go ahead and set him up for small bowel   biopsies as well.      ACT/HN  dd: 09/13/2018 09:21:37 (CDT)  td: 09/13/2018 17:41:52 (CDT)  Doc ID   #[id number]  Job ID #[id number]    CC:

## 2018-09-14 ENCOUNTER — PATIENT MESSAGE (OUTPATIENT)
Dept: ADMINISTRATIVE | Facility: OTHER | Age: 53
End: 2018-09-14

## 2018-09-14 ENCOUNTER — OFFICE VISIT (OUTPATIENT)
Dept: PODIATRY | Facility: CLINIC | Age: 53
End: 2018-09-14
Payer: COMMERCIAL

## 2018-09-14 VITALS — WEIGHT: 254.94 LBS | HEIGHT: 71 IN | BODY MASS INDEX: 35.69 KG/M2

## 2018-09-14 DIAGNOSIS — M20.11 VALGUS DEFORMITY OF BOTH GREAT TOES: ICD-10-CM

## 2018-09-14 DIAGNOSIS — E11.9 TYPE 2 DIABETES MELLITUS WITHOUT COMPLICATION, WITHOUT LONG-TERM CURRENT USE OF INSULIN: Primary | Chronic | ICD-10-CM

## 2018-09-14 DIAGNOSIS — M20.12 VALGUS DEFORMITY OF BOTH GREAT TOES: ICD-10-CM

## 2018-09-14 DIAGNOSIS — M21.629 TAILOR'S BUNION: ICD-10-CM

## 2018-09-14 PROCEDURE — 99999 PR PBB SHADOW E&M-EST. PATIENT-LVL III: CPT | Mod: PBBFAC,,, | Performed by: PODIATRIST

## 2018-09-14 PROCEDURE — 3008F BODY MASS INDEX DOCD: CPT | Mod: CPTII,S$GLB,, | Performed by: PODIATRIST

## 2018-09-14 PROCEDURE — 99213 OFFICE O/P EST LOW 20 MIN: CPT | Mod: S$GLB,,, | Performed by: PODIATRIST

## 2018-09-14 PROCEDURE — 3044F HG A1C LEVEL LT 7.0%: CPT | Mod: CPTII,S$GLB,, | Performed by: PODIATRIST

## 2018-09-15 NOTE — PROGRESS NOTES
Subjective:      Patient ID: Geo Lang is a 52 y.o. male.    Chief Complaint: Diabetes Mellitus and Diabetic Foot Exam    Geo is a 52 y.o. male who presents to the clinic for evaluation and treatment of diabetic feet. Geo has a past medical history of Anxiety (10/2/2013), Chronic constipation, Corneal abrasion (20 yrs ago), Diabetes mellitus type I, GERD (gastroesophageal reflux disease), HTN (hypertension) (10/2/2013), and LPRD (laryngopharyngeal reflux disease) (12/10/2013). Patient relates no major problem with feet.  Relates decent control of his blood glucose daily with taking oral medication.      PCP: Pj Velasco MD    Date Last Seen by PCP: 8/7/18    Hemoglobin A1C   Date Value Ref Range Status   08/07/2018 6.4 (H) 4.0 - 5.6 % Final     Comment:     ADA Screening Guidelines:  5.7-6.4%  Consistent with prediabetes  >or=6.5%  Consistent with diabetes  High levels of fetal hemoglobin interfere with the HbA1C  assay. Heterozygous hemoglobin variants (HbS, HgC, etc)do  not significantly interfere with this assay.   However, presence of multiple variants may affect accuracy.     02/02/2018 6.1 (H) 4.0 - 5.6 % Final     Comment:     According to ADA guidelines, hemoglobin A1c <7.0% represents  optimal control in non-pregnant diabetic patients. Different  metrics may apply to specific patient populations.   Standards of Medical Care in Diabetes-2016.  For the purpose of screening for the presence of diabetes:  <5.7%     Consistent with the absence of diabetes  5.7-6.4%  Consistent with increasing risk for diabetes   (prediabetes)  >or=6.5%  Consistent with diabetes  Currently, no consensus exists for use of hemoglobin A1c  for diagnosis of diabetes for children.  This Hemoglobin A1c assay has significant interference with fetal   hemoglobin   (HbF). The results are invalid for patients with abnormal amounts of   HbF,   including those with known Hereditary Persistence   of Fetal Hemoglobin.  Heterozygous hemoglobin variants (HbAS, HbAC,   HbAD, HbAE, HbA2) do not significantly interfere with this assay;   however, presence of multiple variants in a sample may impact the %   interference.     09/27/2017 6.2 (H) 4.0 - 5.6 % Final     Comment:     According to ADA guidelines, hemoglobin A1c <7.0% represents  optimal control in non-pregnant diabetic patients. Different  metrics may apply to specific patient populations.   Standards of Medical Care in Diabetes-2016.  For the purpose of screening for the presence of diabetes:  <5.7%     Consistent with the absence of diabetes  5.7-6.4%  Consistent with increasing risk for diabetes   (prediabetes)  >or=6.5%  Consistent with diabetes  Currently, no consensus exists for use of hemoglobin A1c  for diagnosis of diabetes for children.  This Hemoglobin A1c assay has significant interference with fetal   hemoglobin   (HbF). The results are invalid for patients with abnormal amounts of   HbF,   including those with known Hereditary Persistence   of Fetal Hemoglobin. Heterozygous hemoglobin variants (HbAS, HbAC,   HbAD, HbAE, HbA2) do not significantly interfere with this assay;   however, presence of multiple variants in a sample may impact the %   interference.             Past Medical History:   Diagnosis Date    Anxiety 10/2/2013    Chronic constipation     Corneal abrasion 20 yrs ago    ou from cls    Diabetes mellitus type I     GERD (gastroesophageal reflux disease)     HTN (hypertension) 10/2/2013    LPRD (laryngopharyngeal reflux disease) 12/10/2013       Past Surgical History:   Procedure Laterality Date    COLONOSCOPY N/A 11/7/2015    Procedure: COLONOSCOPY;  Surgeon: Sanford Isidro MD;  Location: [facility] ([address] [place] [address]);  Service: Endoscopy;  Laterality: N/A;    COLONOSCOPY N/A 11/7/2015    Performed by Sanford Isidro MD at [facility] (4TH FLR)    EGD (ESOPHAGOGASTRODUODENOSCOPY) N/A 12/23/2013    Performed by Toño Veloz MD at [facility]  (4TH FLR)    MANOMETRY, ESOPHAGEAL N/A 3/5/2014    [facility] by [facility] at [facility] (4TH FLR)    nissen         Family History   Problem Relation Age of Onset    Hypertension Father     Hyperlipidemia Father     Heart failure Mother     Diabetes Mother     Hypertension Sister     Diabetes Sister     Hypertension Brother     Diabetes Brother     Hypertension Sister     No Known Problems Maternal Aunt     No Known Problems Maternal Uncle     No Known Problems Paternal Aunt     No Known Problems Paternal Uncle     No Known Problems Maternal Grandmother     No Known Problems Maternal Grandfather     No Known Problems Paternal Grandmother     No Known Problems Paternal Grandfather     Melanoma Neg Hx     Amblyopia Neg Hx     Blindness Neg Hx     Cancer Neg Hx     Cataracts Neg Hx     Glaucoma Neg Hx     Macular degeneration Neg Hx     Retinal detachment Neg Hx     Strabismus Neg Hx     Stroke Neg Hx     Thyroid disease Neg Hx     Colon cancer Neg Hx     Esophageal cancer Neg Hx        Social History     Socioeconomic History    Marital status: Single     Spouse name: Not on file    Number of children: Not on file    Years of education: Not on file    Highest education level: Not on file   Social Needs    Financial resource strain: Not on file    Food insecurity - worry: Not on file    Food insecurity - inability: Not on file    Transportation needs - medical: Not on file    Transportation needs - non-medical: Not on file   Occupational History    Not on file   Tobacco Use    Smoking status: Former Smoker     Packs/day: 1.00     Years: 15.00     Pack years: 15.00     Last attempt to quit: 3/15/2013     Years since quittin.5    Smokeless tobacco: Never Used   Substance and Sexual Activity    Alcohol use: No    Drug use: No    Sexual activity: Not on file   Other Topics Concern    Not on file   Social History Narrative    Not on file       Current Outpatient  Medications   Medication Sig Dispense Refill    ALPRAZolam (XANAX) 1 MG tablet Take 1 tablet (1 mg total) by mouth 2 (two) times daily. 15 tablet 2    atenolol (TENORMIN) 50 MG tablet Take 1 tablet (50 mg total) by mouth once daily. 90 tablet 3    atorvastatin (LIPITOR) 20 MG tablet Take 1 tablet (20 mg total) by mouth once daily. 90 tablet 11    blood sugar diagnostic Strp 1 strip by Misc.(Non-Drug; Combo Route) route 2 (two) times daily. New diabetic requiring more frequent testing. 100 strip 6    blood sugar diagnostic Strp Use daily as directed 100 each 4    clobetasol 0.05% (TEMOVATE) 0.05 % Oint AAA bid 60 g 3    empagliflozin-metformin (SYNJARDY) 5-1,000 mg Tab Take 1 tablet by mouth 2 (two) times daily. 60 tablet 12    hydroCHLOROthiazide (MICROZIDE) 12.5 mg capsule Take 1 capsule (12.5 mg total) by mouth once daily. 90 capsule 3    lancets 33 gauge Misc 1 lancet by Misc.(Non-Drug; Combo Route) route once daily. 50 each 6    lisinopril (PRINIVIL,ZESTRIL) 20 MG tablet Take 1 tablet (20 mg total) by mouth once daily. 90 tablet 4    mupirocin calcium 2% (BACTROBAN) 2 % cream Apply topically 3 (three) times daily. 15 g 1    ondansetron (ZOFRAN) 8 MG tablet TAKE ONE TABLET BY MOUTH EVERY 12 HOURS AS NEEDED FOR NAUSEA 12 tablet 1     No current facility-administered medications for this visit.        Review of patient's allergies indicates:  No Known Allergies      Review of Systems   Constitution: Negative for chills and fever.   Cardiovascular: Negative for claudication and leg swelling.   Skin: Negative for color change and nail changes.   Musculoskeletal: Negative for joint pain, joint swelling, muscle cramps and muscle weakness.   Neurological: Negative for numbness and paresthesias.   Psychiatric/Behavioral: Negative for altered mental status.           Objective:      Physical Exam   Constitutional: He is oriented to person, place, and time. He appears well-developed and well-nourished. No  distress.   Cardiovascular:   Pulses:       Dorsalis pedis pulses are 2+ on the right side, and 2+ on the left side.        Posterior tibial pulses are 2+ on the right side, and 2+ on the left side.   CFT is < 3 seconds bilateral.  Pedal hair growth is present bilateral.  No varicosities noted bilateral.  No lower extremity edema noted bilateral.  Toes are warm to touch bilateral.     Musculoskeletal: He exhibits no edema or tenderness.   Muscle strength 5/5 in all muscle groups bilateral.  No tenderness nor crepitation with ROM of foot/ankle joints bilateral.  No tenderness with palpation of bilateral foot and ankle.  Bilateral pes planus foot type.  Bilateral hallux abducto valgus.  Bilateral Tailors bunion.  Rectus alignment of remaining lesser digits bilateral.   Neurological: He is alert and oriented to person, place, and time. He has normal strength. No sensory deficit.   Protective sensation per [place] monofilament is intact bilateral.  Vibratory sensation is intact bilateral.  Light touch is intact bilateral.   Skin: Skin is warm, dry and intact. Capillary refill takes less than 2 seconds. No abrasion, no bruising, no burn, no ecchymosis, no laceration, no lesion and no petechiae noted. He is not diaphoretic.   Pedal skin has normal turgor, temperature, and texture bilateral.  Toenails x 10 appear normotrophic. Examination of the skin reveals no evidence of significant maceration, rashes, open lesions, suspicious appearing nevi or other concerning lesions.              Assessment:       Encounter Diagnoses   Name Primary?    Type 2 diabetes mellitus without complication, without long-term current use of insulin Yes    Valgus deformity of both great toes     Tailor's bunion          Plan:       Geo was seen today for diabetes mellitus and diabetic foot exam.    Diagnoses and all orders for this visit:    Type 2 diabetes mellitus without complication, without long-term current use of  insulin    Valgus deformity of both great toes    Lucas'aimee reina      I counseled the patient on his conditions, their implications and medical management.    Advised to continue wearing shoe gear that accommodates digital deformities.    Shoe inspection. Diabetic Foot Education. Patient reminded of the importance of good nutrition and blood sugar control to help prevent podiatric complications of diabetes. Patient instructed on proper foot hygeine. We discussed wearing proper shoe gear, daily foot inspections, never walking without protective shoe gear, never putting sharp instruments to feet    Patient instructed to inspect his feet, wear protective shoe gear when ambulatory, moisturizer to maintain skin integrity and follow in this office in approximately 12 months, sooner p.r.n.    Follow-up in about 1 year (around 9/14/2019).    Ha Tobias DPM

## 2018-09-26 ENCOUNTER — PATIENT MESSAGE (OUTPATIENT)
Dept: PULMONOLOGY | Facility: CLINIC | Age: 53
End: 2018-09-26

## 2018-09-26 ENCOUNTER — OFFICE VISIT (OUTPATIENT)
Dept: PULMONOLOGY | Facility: CLINIC | Age: 53
End: 2018-09-26
Payer: COMMERCIAL

## 2018-09-26 VITALS
DIASTOLIC BLOOD PRESSURE: 84 MMHG | BODY MASS INDEX: 35.99 KG/M2 | OXYGEN SATURATION: 98 % | HEART RATE: 98 BPM | HEIGHT: 71 IN | SYSTOLIC BLOOD PRESSURE: 130 MMHG | WEIGHT: 257.06 LBS

## 2018-09-26 DIAGNOSIS — R06.09 DYSPNEA ON EXERTION: ICD-10-CM

## 2018-09-26 DIAGNOSIS — G47.33 OSA (OBSTRUCTIVE SLEEP APNEA): Primary | ICD-10-CM

## 2018-09-26 PROCEDURE — 99214 OFFICE O/P EST MOD 30 MIN: CPT | Mod: S$GLB,,, | Performed by: INTERNAL MEDICINE

## 2018-09-26 PROCEDURE — 3008F BODY MASS INDEX DOCD: CPT | Mod: CPTII,S$GLB,, | Performed by: INTERNAL MEDICINE

## 2018-09-26 PROCEDURE — 99999 PR PBB SHADOW E&M-EST. PATIENT-LVL III: CPT | Mod: PBBFAC,,, | Performed by: INTERNAL MEDICINE

## 2018-09-26 PROCEDURE — 3075F SYST BP GE 130 - 139MM HG: CPT | Mod: CPTII,S$GLB,, | Performed by: INTERNAL MEDICINE

## 2018-09-26 PROCEDURE — 3079F DIAST BP 80-89 MM HG: CPT | Mod: CPTII,S$GLB,, | Performed by: INTERNAL MEDICINE

## 2018-09-26 NOTE — PROGRESS NOTES
"Geo Lang  was seen as a follow up.  Our last encounter was 4/2017.      CHIEF COMPLAINT:    Chief Complaint   Patient presents with    Sleep Apnea    Shortness of Breath       HISTORY OF PRESENT ILLNESS: Geo Lang is a 52 y.o. male is here for sleep evaluation.   Our first encounter was 10/15/2013.  At that time, patient was recently evaluated by Dr. Castro for nasal congestion.  Laryngoscopy under Richter maneuver revealed obstruction in retro palatal and  retrolingual region.  Patient with loud snoring.  No witnessed apnea.  +fatigue upon awakening.  No sleepiness a/w driving.  No parasomnia.  No RLS symptoms.  No cataplexy.  Patient admits to having difficulty with sleep initiation and maintanence x several weeks.  Father recently passed away.  Patient was spending lots of his time to care for his father prior to his death.  After his father's death, patient tried to go to sleep earlier and have not been successful.      Steen Sleepiness Scale score during initial sleep evaluation was 10.    Patient underwent home study 12/2013 with ahi of 25.  Treatment options d/w patient.  Patient declined treatment in 2013.  Patient was set up with apap 1/2017.  With apap, patient denied snoring.  Sleep is more restful.      Our last encounter was 1/2017.  No issue with apap.  Since 7/18, patient with intermittent episode of not getting "enough air."  Typical episode would occur when talking and patient would need to pause and take deep breathing.  The sensation only lasted a few second.  Usually resolved with cessation of talking.  No coughing or wheezing.  No syncope or near syncope.  No chest pain.  No orthopnea.  No pnd.  No dyspnea with bending over.   S/p nissen funduplication in 2009.    SLEEP ROUTINE:  Activity the hour prior to sleep: watch tv in sofa    Bed partner:  none  Time to bed:  12 am  Lights off:  TV is on; sleep in sofa  Sleep onset latency:  20 minutes after xanax       Disruptions or " awakenings:   0-1 time (no difficulty going back to sleep)  Wakeup time:      7:30 am  Perceived sleep quality:  tired       Daytime naps:      none  Weekend sleep routine:      3-4 am till 7:30 am  Caffeine use: 1 cup of ice coffee in am.    exercise habit:   none    PAST MEDICAL HISTORY:    Active Ambulatory Problems     Diagnosis Date Noted    Essential hypertension 10/02/2013    Anxiety 10/02/2013    LPRD (laryngopharyngeal reflux disease) 12/10/2013    GERD (gastroesophageal reflux disease) 12/23/2013    Obesity (BMI 30-39.9) 06/09/2015    Pain in the chest 06/09/2015    Type 2 diabetes mellitus without complication 07/31/2015    HLD (hyperlipidemia) 07/31/2015    CALVIN (obstructive sleep apnea) 07/31/2015    Colon cancer screening 11/07/2015    Diverticulosis of large intestine without hemorrhage 11/07/2015     Resolved Ambulatory Problems     Diagnosis Date Noted    No Resolved Ambulatory Problems     Past Medical History:   Diagnosis Date    Anxiety 10/2/2013    Chronic constipation     Corneal abrasion 20 yrs ago    Diabetes mellitus type I     GERD (gastroesophageal reflux disease)     HTN (hypertension) 10/2/2013    LPRD (laryngopharyngeal reflux disease) 12/10/2013                PAST SURGICAL HISTORY:    Past Surgical History:   Procedure Laterality Date    COLONOSCOPY N/A 11/7/2015    Procedure: COLONOSCOPY;  Surgeon: Sanford Isidro MD;  Location: [facility] ([address] [place] [address]);  Service: Endoscopy;  Laterality: N/A;    COLONOSCOPY N/A 11/7/2015    Performed by Sanford Isidro MD at [facility] (4TH FLR)    EGD (ESOPHAGOGASTRODUODENOSCOPY) N/A 12/23/2013    Performed by Toño Veloz MD at [facility] (4TH FLR)    MANOMETRY, ESOPHAGUS N/A 3/5/2014    Performed by Toño Veloz MD at [facility] (4TH FLR)    nissen           FAMILY HISTORY:                Family History   Problem Relation Age of Onset    Hypertension Father     Hyperlipidemia Father     Heart failure Mother      Diabetes Mother     Hypertension Sister     Diabetes Sister     Hypertension Brother     Diabetes Brother     Hypertension Sister     No Known Problems Maternal Aunt     No Known Problems Maternal Uncle     No Known Problems Paternal Aunt     No Known Problems Paternal Uncle     No Known Problems Maternal Grandmother     No Known Problems Maternal Grandfather     No Known Problems Paternal Grandmother     No Known Problems Paternal Grandfather     Melanoma Neg Hx     Amblyopia Neg Hx     Blindness Neg Hx     Cancer Neg Hx     Cataracts Neg Hx     Glaucoma Neg Hx     Macular degeneration Neg Hx     Retinal detachment Neg Hx     Strabismus Neg Hx     Stroke Neg Hx     Thyroid disease Neg Hx     Colon cancer Neg Hx     Esophageal cancer Neg Hx        SOCIAL HISTORY:          Tobacco:   Social History     Tobacco Use   Smoking Status Former Smoker    Packs/day: 1.00    Years: 15.00    Pack years: 15.00    Last attempt to quit: 3/15/2013    Years since quittin.5   Smokeless Tobacco Never Used       alcohol use:    Social History     Substance and Sexual Activity   Alcohol Use No                 Occupation:  Box office for [email or web address]    ALLERGIES:  Review of patient's allergies indicates:  No Known Allergies    CURRENT MEDICATIONS:    Current Outpatient Medications   Medication Sig Dispense Refill    ALPRAZolam (XANAX) 1 MG tablet Take 1 tablet (1 mg total) by mouth 2 (two) times daily. 15 tablet 2    atenolol (TENORMIN) 50 MG tablet Take 1 tablet (50 mg total) by mouth once daily. 90 tablet 3    atorvastatin (LIPITOR) 20 MG tablet Take 1 tablet (20 mg total) by mouth once daily. 90 tablet 11    blood sugar diagnostic Strp 1 strip by Misc.(Non-Drug; Combo Route) route 2 (two) times daily. New diabetic requiring more frequent testing. 100 strip 6    blood sugar diagnostic Strp Use daily as directed 100 each 4    clobetasol 0.05% (TEMOVATE) 0.05 % Oint AAA bid 60 g 3     "empagliflozin-metformin (SYNJARDY) 5-1,000 mg Tab Take 1 tablet by mouth 2 (two) times daily. 60 tablet 12    hydroCHLOROthiazide (MICROZIDE) 12.5 mg capsule Take 1 capsule (12.5 mg total) by mouth once daily. 90 capsule 3    lancets 33 gauge Misc 1 lancet by Misc.(Non-Drug; Combo Route) route once daily. 50 each 6    lisinopril (PRINIVIL,ZESTRIL) 20 MG tablet Take 1 tablet (20 mg total) by mouth once daily. 90 tablet 4    mupirocin calcium 2% (BACTROBAN) 2 % cream Apply topically 3 (three) times daily. 15 g 1    ondansetron (ZOFRAN) 8 MG tablet TAKE ONE TABLET BY MOUTH EVERY 12 HOURS AS NEEDED FOR NAUSEA 12 tablet 1     No current facility-administered medications for this visit.                   REVIEW OF SYSTEMS:     Sleep related symptoms as per HPI.  CONST:  + weight gain  7 lbs since 2017  HEENT: Denies sinus congestion  PULM: per hpi  CARD:  Per hpi   GI:  Denies acid reflux  : Denies polyuria  NEURO: +intermittent headaches  PSYCH: Denies mood disturbance  HEME: Denies anemia   Otherwise, a balance of systems reviewed is negative.                    PHYSICAL EXAM:  Vitals:    09/26/18 1328   BP: 130/84   Pulse: 98   SpO2: 98%   Weight: 116.6 kg (257 lb 0.9 oz)   Height: 5' 11" (1.803 m)   PainSc: 0-No pain     Body mass index is 35.85 kg/m².     GENERAL: Normal development, well groomed  HEENT:  Conjunctivae are non-erythematous; Pupils equal, round, and reactive to light; Nose is symmetrical; Nasal mucosa is pink and moist; Septum is midline; Inferior turbinates are normal; Nasal airflow is congested; Posterior pharynx is pink; Modified Mallampati: 4; Posterior palate is normal; Tonsils +2; Uvula is normal and pink;Tongue is normal; Dentition is fair; No TMJ tenderness; Jaw opening and protrusion without click and without discomfort.  NECK: Supple. Neck circumference is 17.75 inches. No thyromegaly. No palpable nodes.     SKIN: On face and neck: No abrasions, no rashes, no lesions.  No subcutaneous " nodules are palpable.  RESPIRATORY: Chest is clear to auscultation.  Normal chest expansion and non-labored breathing at rest.  CARDIOVASCULAR: Normal S1, S2.  No murmurs, gallops or rubs. No carotid bruits bilaterally.  EXTREMITIES: No edema. No clubbing. No cyanosis. Station normal. Gait normal.        NEURO/PSYCH: Oriented to time, place and person. Normal attention span and concentration. Affect is full. Mood is normal.                                              DATA  12/9/13 home sleep testing ahi of 25    Lab Results   Component Value Date    TSH 1.320 09/13/2018     ASSESSMENT  1. CALVIN (obstructive sleep apnea)     2. Dyspnea on exertion  DLCO-Carbon Monoxide Diffusing Capacity    LUNG VOLUMES    Spirometry without Bronchodilator    SPIROMETRY SUPINE/ERECT    MVV (MAXIMUM VOLUNTARY VENTILATION)    MAXIMAL INSPIRATORY/EXPIRATORY PRESSURE    Stress test, pulmonary       PLAN:    Sleep Apnea - doing well with apap and [employer].  93%>4 hours.  Residual ahi of 4.1.  Patient is benefiting from apap.      Insomnia -  Off xanax.  Resolved.  Encourage extending sleep time to min tst of 6.5 hours.      Nasal congestion -  Improve with apap.      Dyspnea - intermittent dyspnea a/w prolonged phonation.  etiology unclear.  I suspect it may relate to conditioning.  Patient had gradual gained  ~25 lbs since 2008.  Will send for baseline pft, 6 min walk test, mvv and supine and erect spirometry.    Education: During our discussion today, we talked about the etiology of obstructive sleep apnea as well as the potential ramifications of untreated sleep apnea, which could include daytime sleepiness, hypertension, heart disease and/or stroke.     Precautions: The patient was advised to abstain from driving should they feel sleepy or drowsy.       Patient will No Follow-up on file.    This is 25 minutes visit, over 50% of time spent in direct consultation with patient.

## 2018-09-28 ENCOUNTER — PATIENT MESSAGE (OUTPATIENT)
Dept: PULMONOLOGY | Facility: CLINIC | Age: 53
End: 2018-09-28

## 2018-09-28 ENCOUNTER — PATIENT MESSAGE (OUTPATIENT)
Dept: INTERNAL MEDICINE | Facility: CLINIC | Age: 53
End: 2018-09-28

## 2018-10-01 ENCOUNTER — HOSPITAL ENCOUNTER (OUTPATIENT)
Dept: PULMONOLOGY | Facility: CLINIC | Age: 53
Discharge: HOME | End: 2018-10-01
Payer: COMMERCIAL

## 2018-10-01 VITALS — WEIGHT: 255.75 LBS | BODY MASS INDEX: 35.81 KG/M2 | HEIGHT: 71 IN

## 2018-10-01 DIAGNOSIS — R06.09 DYSPNEA ON EXERTION: ICD-10-CM

## 2018-10-01 DIAGNOSIS — G47.33 OSA (OBSTRUCTIVE SLEEP APNEA): Primary | Chronic | ICD-10-CM

## 2018-10-01 LAB
PRE FEV1 FVC: 67
PRE FEV1 FVC: 72
PRE FEV1: 3.02
PRE FEV1: 3.34
PRE FVC: 4.48
PRE FVC: 4.67
PREDICTED FEV1 FVC: 81
PREDICTED FEV1 FVC: 81
PREDICTED FEV1: 3.97
PREDICTED FEV1: 3.97
PREDICTED FVC: 4.88
PREDICTED FVC: 4.88

## 2018-10-01 PROCEDURE — 94727 GAS DIL/WSHOT DETER LNG VOL: CPT | Mod: S$GLB,,, | Performed by: INTERNAL MEDICINE

## 2018-10-01 PROCEDURE — 94729 DIFFUSING CAPACITY: CPT | Mod: S$GLB,,, | Performed by: INTERNAL MEDICINE

## 2018-10-01 PROCEDURE — 94010 BREATHING CAPACITY TEST: CPT | Mod: 59,S$GLB,, | Performed by: INTERNAL MEDICINE

## 2018-10-01 PROCEDURE — 94618 PULMONARY STRESS TESTING: CPT | Mod: S$GLB,,, | Performed by: INTERNAL MEDICINE

## 2018-10-01 NOTE — PROCEDURES
Geo Lang is a 52 y.o.  male patient, who presents for a 6 minute walk test ordered by MD Yanira.  The diagnosis is Shortness of Breath.  The patient's BMI is 36.3 kg/m2.  Predicted distance (lower limit of normal) is 422.48 meters.      Test Results:    The test was completed without stopping.    The total time walked was 360 seconds.  During walking, the patient reported:  Dyspnea. The patient used no assistive devices  during testing.     10/01/2018---------Distance: 609.6 meters (2000 feet)     O2 Sat % Supplemental Oxygen Heart Rate Blood Pressure Mauro Scale   Pre-exercise  (Resting) 98 % Room Air 79 bpm 146/85 mmHg 0.5   During Exercise 98 % Room Air 119 bpm (!) 169/94 mmHg 2   Post-exercise  (Recovery) 98 % Room Air  91 bpm   mmHg       Recovery Time: 94 seconds    Performing nurse/tech: Mely WHYTE      PREVIOUS STUDY:   The patient has not had a previous study.      CLINICAL INTERPRETATION:  Six minute walk distance is 609.6 meters (2000 feet) with light dyspnea.  During exercise, there was no significant desaturation while breathing room air.  Both blood pressure and heart rate increased significantly with walking.  This may represent a hypertensive and a tachycardic response to exercise.  The patient did not report non-pulmonary symptoms during exercise.  No previous study performed.  Based upon age and body mass index, exercise capacity is normal.

## 2018-10-03 ENCOUNTER — PATIENT MESSAGE (OUTPATIENT)
Dept: PODIATRY | Facility: CLINIC | Age: 53
End: 2018-10-03

## 2018-10-04 ENCOUNTER — PATIENT MESSAGE (OUTPATIENT)
Dept: PULMONOLOGY | Facility: CLINIC | Age: 53
End: 2018-10-04

## 2018-11-09 ENCOUNTER — PATIENT MESSAGE (OUTPATIENT)
Dept: INTERNAL MEDICINE | Facility: CLINIC | Age: 53
End: 2018-11-09

## 2018-11-09 RX ORDER — ESOMEPRAZOLE MAGNESIUM 40 MG/1
40 CAPSULE, DELAYED RELEASE ORAL
Qty: 30 CAPSULE | Refills: 11 | Status: SHIPPED | OUTPATIENT
Start: 2018-11-09 | End: 2019-10-30 | Stop reason: SDUPTHER

## 2018-11-13 ENCOUNTER — OFFICE VISIT (OUTPATIENT)
Dept: CARDIOLOGY | Facility: CLINIC | Age: 53
End: 2018-11-13
Payer: COMMERCIAL

## 2018-11-13 VITALS
HEIGHT: 71 IN | DIASTOLIC BLOOD PRESSURE: 88 MMHG | HEART RATE: 73 BPM | BODY MASS INDEX: 35.25 KG/M2 | WEIGHT: 251.81 LBS | SYSTOLIC BLOOD PRESSURE: 146 MMHG

## 2018-11-13 DIAGNOSIS — E11.9 TYPE 2 DIABETES MELLITUS WITHOUT COMPLICATION, WITHOUT LONG-TERM CURRENT USE OF INSULIN: Chronic | ICD-10-CM

## 2018-11-13 DIAGNOSIS — I10 ESSENTIAL (PRIMARY) HYPERTENSION: ICD-10-CM

## 2018-11-13 DIAGNOSIS — E78.2 MIXED HYPERLIPIDEMIA: Chronic | ICD-10-CM

## 2018-11-13 DIAGNOSIS — I10 ESSENTIAL HYPERTENSION: Primary | Chronic | ICD-10-CM

## 2018-11-13 DIAGNOSIS — R07.9 CHEST PAIN OF UNCERTAIN ETIOLOGY: ICD-10-CM

## 2018-11-13 DIAGNOSIS — E66.9 OBESITY (BMI 30-39.9): ICD-10-CM

## 2018-11-13 PROCEDURE — 99999 PR PBB SHADOW E&M-EST. PATIENT-LVL III: CPT | Mod: PBBFAC,,, | Performed by: INTERNAL MEDICINE

## 2018-11-13 PROCEDURE — 3077F SYST BP >= 140 MM HG: CPT | Mod: CPTII,S$GLB,, | Performed by: INTERNAL MEDICINE

## 2018-11-13 PROCEDURE — 3008F BODY MASS INDEX DOCD: CPT | Mod: CPTII,S$GLB,, | Performed by: INTERNAL MEDICINE

## 2018-11-13 PROCEDURE — 3079F DIAST BP 80-89 MM HG: CPT | Mod: CPTII,S$GLB,, | Performed by: INTERNAL MEDICINE

## 2018-11-13 PROCEDURE — 99205 OFFICE O/P NEW HI 60 MIN: CPT | Mod: 25,S$GLB,, | Performed by: INTERNAL MEDICINE

## 2018-11-13 PROCEDURE — 3044F HG A1C LEVEL LT 7.0%: CPT | Mod: CPTII,S$GLB,, | Performed by: INTERNAL MEDICINE

## 2018-11-13 PROCEDURE — 93000 ELECTROCARDIOGRAM COMPLETE: CPT | Mod: S$GLB,,, | Performed by: INTERNAL MEDICINE

## 2018-11-13 RX ORDER — LISINOPRIL 20 MG/1
40 TABLET ORAL DAILY
Qty: 180 TABLET | Refills: 3 | Status: SHIPPED | OUTPATIENT
Start: 2018-11-13 | End: 2018-12-17

## 2018-11-13 NOTE — PROGRESS NOTES
Subjective:      Patient ID: Geo Lang is a 53 y.o. male.    Chief Complaint: Hypertension    HPI:  Pt c/o left anterior chest pains radiating to left shoulder which last about 5 minutes and occur at rest.   The chest pains usually occur when lying on his left side.  In the past pt has been told he had muscular pain in his chest.  There is no hx of chest pain with exertion.  Pt c/o chronic shortness of breath when mowing the lawn.    Pt is concerned about his blood pressure occasionally reading high even though he is already on 3 meds.  Last night BP was 158 / 98 at home  Review of Systems   Cardiovascular: Positive for chest pain, dyspnea on exertion and palpitations (heart beats faster when lying down at night). Negative for claudication, irregular heartbeat, leg swelling, near-syncope, orthopnea and syncope.      Pt had a stress test over 5 years ago.    Pt works at the [employer]    Past Medical History:   Diagnosis Date    Anxiety 10/2/2013    Chronic constipation     Corneal abrasion 20 yrs ago    ou from cls    Diabetes mellitus type I     GERD (gastroesophageal reflux disease)     HTN (hypertension) 10/2/2013    Hyperlipidemia     LPRD (laryngopharyngeal reflux disease) 12/10/2013        Past Surgical History:   Procedure Laterality Date    COLONOSCOPY N/A 11/7/2015    Procedure: COLONOSCOPY;  Surgeon: Sanford Isidro MD;  Location: [facility] ([address] [place] [address]);  Service: Endoscopy;  Laterality: N/A;    COLONOSCOPY N/A 11/7/2015    Performed by Sanford Isidro MD at [facility] (4TH FLR)    EGD (ESOPHAGOGASTRODUODENOSCOPY) N/A 12/23/2013    Performed by Toño Veloz MD at [facility] (4TH FLR)    MANOMETRY, ESOPHAGUS N/A 3/5/2014    Performed by Toño Veloz MD at [facility] (4TH FLR)    nissen         Family History   Problem Relation Age of Onset    Hypertension Father     Hyperlipidemia Father     Heart failure Mother     Diabetes Mother     Hypertension Sister      Diabetes Sister     Hypertension Brother     Diabetes Brother     Hypertension Sister     No Known Problems Maternal Aunt     No Known Problems Maternal Uncle     No Known Problems Paternal Aunt     No Known Problems Paternal Uncle     No Known Problems Maternal Grandmother     No Known Problems Maternal Grandfather     No Known Problems Paternal Grandmother     No Known Problems Paternal Grandfather     Melanoma Neg Hx     Amblyopia Neg Hx     Blindness Neg Hx     Cancer Neg Hx     Cataracts Neg Hx     Glaucoma Neg Hx     Macular degeneration Neg Hx     Retinal detachment Neg Hx     Strabismus Neg Hx     Stroke Neg Hx     Thyroid disease Neg Hx     Colon cancer Neg Hx     Esophageal cancer Neg Hx        Social History     Socioeconomic History    Marital status: Single     Spouse name: None    Number of children: None    Years of education: None    Highest education level: None   Social Needs    Financial resource strain: None    Food insecurity - worry: None    Food insecurity - inability: None    Transportation needs - medical: None    Transportation needs - non-medical: None   Occupational History    None   Tobacco Use    Smoking status: Former Smoker     Packs/day: 1.00     Years: 15.00     Pack years: 15.00     Last attempt to quit: 3/15/2013     Years since quittin.6    Smokeless tobacco: Never Used   Substance and Sexual Activity    Alcohol use: No    Drug use: No    Sexual activity: None   Other Topics Concern    None   Social History Narrative    None       Current Outpatient Medications on File Prior to Visit   Medication Sig Dispense Refill    ALPRAZolam (XANAX) 1 MG tablet Take 1 tablet (1 mg total) by mouth 2 (two) times daily. 15 tablet 2    atenolol (TENORMIN) 50 MG tablet Take 1 tablet (50 mg total) by mouth once daily. 90 tablet 3    atorvastatin (LIPITOR) 20 MG tablet Take 1 tablet (20 mg total) by mouth once daily. 90 tablet 11    clobetasol 0.05%  "(TEMOVATE) 0.05 % Oint AAA bid 60 g 3    empagliflozin-metformin (SYNJARDY) 5-1,000 mg Tab Take 1 tablet by mouth 2 (two) times daily. 60 tablet 12    esomeprazole (NEXIUM) 40 MG capsule Take 1 capsule (40 mg total) by mouth before breakfast. 30 capsule 11    hydroCHLOROthiazide (MICROZIDE) 12.5 mg capsule Take 1 capsule (12.5 mg total) by mouth once daily. 90 capsule 3    mupirocin calcium 2% (BACTROBAN) 2 % cream Apply topically 3 (three) times daily. 15 g 1    ondansetron (ZOFRAN) 8 MG tablet TAKE ONE TABLET BY MOUTH EVERY 12 HOURS AS NEEDED FOR NAUSEA 12 tablet 1    [DISCONTINUED] lisinopril (PRINIVIL,ZESTRIL) 20 MG tablet Take 1 tablet (20 mg total) by mouth once daily. 90 tablet 4    blood sugar diagnostic Strp 1 strip by Misc.(Non-Drug; Combo Route) route 2 (two) times daily. New diabetic requiring more frequent testing. 100 strip 6    blood sugar diagnostic Strp Use daily as directed 100 each 4    lancets 33 gauge Misc 1 lancet by Misc.(Non-Drug; Combo Route) route once daily. 50 each 6     No current facility-administered medications on file prior to visit.        Review of patient's allergies indicates:  No Known Allergies  Objective:     Vitals:    11/13/18 1456   BP: (!) 146/88   BP Location: Left arm   Patient Position: Sitting   BP Method: Large (Automatic)   Pulse: 73   Weight: 114.2 kg (251 lb 12.8 oz)   Height: 5' 10.5" (1.791 m)        Physical Exam   Constitutional: He is oriented to person, place, and time. He appears well-developed and well-nourished. No distress.   Eyes: No scleral icterus.   Neck: No JVD present. Carotid bruit is not present.   Cardiovascular: Regular rhythm and normal heart sounds. Exam reveals no gallop and no friction rub.   No murmur heard.  Pulses:       Posterior tibial pulses are 2+ on the right side, and 2+ on the left side.   Pulmonary/Chest: Effort normal and breath sounds normal. No respiratory distress.       Abdominal: Soft. He exhibits no abdominal " bruit, no pulsatile midline mass and no mass. There is no hepatosplenomegaly. There is no tenderness.   Musculoskeletal: He exhibits no edema.   Neurological: He is alert and oriented to person, place, and time.   Skin: Skin is warm and dry. He is not diaphoretic.   Psychiatric: He has a normal mood and affect. His behavior is normal. Judgment and thought content normal.   Vitals reviewed.     Note recent holter was normal; rare PVC's and PAC's noted    Note stress ECG in 2015 was normal    ECG today NSR, WNL  Assessment:     1. Essential hypertension    2. Mixed hyperlipidemia    3. Type 2 diabetes mellitus without complication, without long-term current use of insulin    4. Obesity (BMI 30-39.9)    5. Chest pain of uncertain etiology    6. Essential (primary) hypertension      Plan:   Geo was seen today for hypertension.    Diagnoses and all orders for this visit:    Essential hypertension  -     IN OFFICE EKG 12-LEAD (to Muse)  -     lisinopril (PRINIVIL,ZESTRIL) 20 MG tablet; Take 2 tablets (40 mg total) by mouth once daily.    Mixed hyperlipidemia    Type 2 diabetes mellitus without complication, without long-term current use of insulin    Obesity (BMI 30-39.9)    Chest pain of uncertain etiology  -     IN OFFICE EKG 12-LEAD (to Muse)  -     Echocardiogram stress test (Cupid Only); Future    Essential (primary) hypertension  -     IN OFFICE EKG 12-LEAD (to Muse)       Suspect musculoskeletal chest wall pain.    Same meds plus increase the lisinopril to 40 mg daily for HBP    RTC one month.  If BP is still elevated would double the HCTZ to 25 mg.  Alternatively we could add amlodipine 2.5 mg daily    Treadmill stress echo to screen for CAD.  Hold atenolol AM of stress test until after the test.    Rest of meds the same     Weight reducing low carb diet.    RTC one month    Long discussion about benefit of BP control.   Long discussion about side effects of meds.    Try to avoid NSAID's    Follow-up in about 4  weeks (around 12/11/2018).

## 2018-12-17 ENCOUNTER — OFFICE VISIT (OUTPATIENT)
Dept: INTERNAL MEDICINE | Facility: CLINIC | Age: 53
End: 2018-12-17
Payer: COMMERCIAL

## 2018-12-17 VITALS
HEIGHT: 71 IN | OXYGEN SATURATION: 96 % | WEIGHT: 249.31 LBS | HEART RATE: 84 BPM | SYSTOLIC BLOOD PRESSURE: 138 MMHG | BODY MASS INDEX: 34.9 KG/M2 | DIASTOLIC BLOOD PRESSURE: 84 MMHG

## 2018-12-17 DIAGNOSIS — F41.9 ANXIETY: ICD-10-CM

## 2018-12-17 DIAGNOSIS — G47.33 OSA (OBSTRUCTIVE SLEEP APNEA): Chronic | ICD-10-CM

## 2018-12-17 DIAGNOSIS — I10 ESSENTIAL HYPERTENSION: Chronic | ICD-10-CM

## 2018-12-17 DIAGNOSIS — R07.9 CHEST PAIN, UNSPECIFIED TYPE: ICD-10-CM

## 2018-12-17 DIAGNOSIS — E78.2 MIXED HYPERLIPIDEMIA: Primary | Chronic | ICD-10-CM

## 2018-12-17 DIAGNOSIS — K21.9 GASTROESOPHAGEAL REFLUX DISEASE WITHOUT ESOPHAGITIS: ICD-10-CM

## 2018-12-17 DIAGNOSIS — E11.9 TYPE 2 DIABETES MELLITUS WITHOUT COMPLICATION, WITHOUT LONG-TERM CURRENT USE OF INSULIN: Chronic | ICD-10-CM

## 2018-12-17 PROCEDURE — 3044F HG A1C LEVEL LT 7.0%: CPT | Mod: CPTII,S$GLB,, | Performed by: INTERNAL MEDICINE

## 2018-12-17 PROCEDURE — 3075F SYST BP GE 130 - 139MM HG: CPT | Mod: CPTII,S$GLB,, | Performed by: INTERNAL MEDICINE

## 2018-12-17 PROCEDURE — 99999 PR PBB SHADOW E&M-EST. PATIENT-LVL III: CPT | Mod: PBBFAC,,, | Performed by: INTERNAL MEDICINE

## 2018-12-17 PROCEDURE — 99214 OFFICE O/P EST MOD 30 MIN: CPT | Mod: S$GLB,,, | Performed by: INTERNAL MEDICINE

## 2018-12-17 PROCEDURE — 3079F DIAST BP 80-89 MM HG: CPT | Mod: CPTII,S$GLB,, | Performed by: INTERNAL MEDICINE

## 2018-12-17 PROCEDURE — 3008F BODY MASS INDEX DOCD: CPT | Mod: CPTII,S$GLB,, | Performed by: INTERNAL MEDICINE

## 2018-12-17 RX ORDER — ATENOLOL 50 MG/1
75 TABLET ORAL DAILY
Qty: 135 TABLET | Refills: 3 | Status: SHIPPED | OUTPATIENT
Start: 2018-12-17 | End: 2020-01-05 | Stop reason: SDUPTHER

## 2018-12-17 RX ORDER — LOSARTAN POTASSIUM 50 MG/1
50 TABLET ORAL DAILY
Qty: 30 TABLET | Refills: 12 | Status: SHIPPED | OUTPATIENT
Start: 2018-12-17 | End: 2019-01-29 | Stop reason: DRUGHIGH

## 2018-12-17 NOTE — PROGRESS NOTES
Subjective:       Patient ID: Geo Lang is a 53 y.o. male.    Chief Complaint: Follow-up (High blood pressure)    Patient comes in follow-up of hypertension.  He saw Cardiology but he is not sure he wants to go back.  They recommended a stress test but he did not do it because of financial reasons but may try to get it before the end of the year.  They raise his lisinopril but that did not seem to make a big difference in the blood pressure so we talked about changing that to losartan.  Cardiology suggested amlodipine but the patient previously had bad edema with that so he does not want to take it.  His sugars have been well controlled.  Weight loss would help both blood pressure and sugar.      Review of Systems   Constitutional: Negative for chills, fatigue, fever and unexpected weight change.   HENT: Negative for nosebleeds and trouble swallowing.    Eyes: Negative for pain and visual disturbance.   Respiratory: Negative for cough, shortness of breath and wheezing.    Cardiovascular: Positive for chest pain (See Cardiology note-not sure if cardiac are reflux but patient encouraged to get stress test.). Negative for palpitations.   Gastrointestinal: Negative for abdominal pain, constipation, diarrhea, nausea and vomiting.   Genitourinary: Negative for difficulty urinating and hematuria.   Musculoskeletal: Negative for neck pain.   Skin: Negative for rash.   Neurological: Negative for dizziness and headaches.   Hematological: Does not bruise/bleed easily.   Psychiatric/Behavioral: Negative for dysphoric mood, sleep disturbance and suicidal ideas.       Objective:      Physical Exam   Constitutional: He is oriented to person, place, and time. He appears well-developed and well-nourished. No distress.   Overweight male no acute distress   HENT:   Head: Normocephalic and atraumatic.   Right Ear: External ear normal.   Left Ear: External ear normal.   Mouth/Throat: Oropharynx is clear and moist. No oropharyngeal  exudate.   TM's clear, pharynx clear   Eyes: Conjunctivae and EOM are normal. Pupils are equal, round, and reactive to light. No scleral icterus.   Neck: Normal range of motion. Neck supple. No thyromegaly present.   No supraclavicular nodes palpated   Cardiovascular: Normal rate, regular rhythm and normal heart sounds.   No murmur heard.  Pulmonary/Chest: Effort normal and breath sounds normal. He has no wheezes.   Abdominal: Soft. Bowel sounds are normal. He exhibits no mass. There is no tenderness.   Musculoskeletal: He exhibits no edema.   Lymphadenopathy:     He has no cervical adenopathy.   Neurological: He is alert and oriented to person, place, and time.   Skin: No rash noted. No erythema. No pallor.   Psychiatric: He has a normal mood and affect. His behavior is normal.   Vitals reviewed.      Assessment:       1. Mixed hyperlipidemia    2. Essential hypertension    3. Type 2 diabetes mellitus without complication, without long-term current use of insulin    4. Anxiety    5. CALVIN (obstructive sleep apnea)    6. Gastroesophageal reflux disease without esophagitis    7. Chest pain, unspecified type        Plan:       Geo was seen today for follow-up.    Diagnoses and all orders for this visit:    Mixed hyperlipidemia    Essential hypertension    Type 2 diabetes mellitus without complication, without long-term current use of insulin    Anxiety    CALVIN (obstructive sleep apnea)    Gastroesophageal reflux disease without esophagitis    Chest pain, unspecified type    Other orders  -     losartan (COZAAR) 50 MG tablet; Take 1 tablet (50 mg total) by mouth once daily.  -     atenolol (TENORMIN) 50 MG tablet; Take 1.5 tablets (75 mg total) by mouth once daily.        seventy list of blood pressure readings in 2 weeks.  Consider getting stress test as advised by Cardiology

## 2018-12-21 ENCOUNTER — HOSPITAL ENCOUNTER (OUTPATIENT)
Dept: CARDIOLOGY | Facility: HOSPITAL | Age: 53
Discharge: HOME | End: 2018-12-21
Attending: INTERNAL MEDICINE
Payer: COMMERCIAL

## 2018-12-21 ENCOUNTER — TELEPHONE (OUTPATIENT)
Dept: CARDIOLOGY | Facility: CLINIC | Age: 53
End: 2018-12-21

## 2018-12-21 DIAGNOSIS — R07.9 CHEST PAIN OF UNCERTAIN ETIOLOGY: ICD-10-CM

## 2018-12-21 LAB
CV STRESS BASE HR: 100
DIASTOLIC BLOOD PRESSURE: 60
OHS CV CPX 1 MINUTE RECOVERY HEART RATE: 141 BPM
OHS CV CPX 85 PERCENT MAX PREDICTED HEART RATE MALE: 142
OHS CV CPX ESTIMATED METS: 11
OHS CV CPX MAX PREDICTED HEART RATE: 167
OHS CV CPX PATIENT IS FEMALE: 0
OHS CV CPX PATIENT IS MALE: 1
OHS CV CPX PEAK DIASTOLIC BLOOD PRESSURE: 66 MMHG
OHS CV CPX PEAK HEAR RATE: 169
OHS CV CPX PEAK RATE PRESSURE PRODUCT: NORMAL
OHS CV CPX PEAK SYSTOLIC BLOOD PRESSURE: 192
OHS CV CPX PERCENT MAX PREDICTED HEART RATE ACHIEVED: 101
OHS CV CPX PERCENT TARGET HEART RATE ACHIEVED: 101.2
OHS CV CPX RATE PRESSURE PRODUCT PRESENTING: NORMAL
OHS CV CPX TARGET HEART RATE: 167
STRESS ANGINA INDEX: 0
STRESS DUKE TREADMILL SCORE: 9
STRESS ECHO POST EXERCISE DUR MIN: 9 MIN
STRESS ECHO POST EXERCISE DUR SEC: 24
STRESS ST DEPRESSION: 0 MM
STRESS ST ELEVATION: 0 MM
SYSTOLIC BLOOD PRESSURE: 105

## 2018-12-21 PROCEDURE — 93351 STRESS TTE COMPLETE: CPT

## 2018-12-21 PROCEDURE — 93351 STRESS TTE COMPLETE: CPT | Mod: 26,,, | Performed by: STUDENT IN AN ORGANIZED HEALTH CARE EDUCATION/TRAINING PROGRAM

## 2018-12-22 ENCOUNTER — PATIENT MESSAGE (OUTPATIENT)
Dept: CARDIOLOGY | Facility: CLINIC | Age: 53
End: 2018-12-22

## 2018-12-27 NOTE — TELEPHONE ENCOUNTER
Results of stress test released by Dr Tellez.  Phoned patient to let him know it was released and he should be able to see it in [facility].

## 2018-12-31 ENCOUNTER — TELEPHONE (OUTPATIENT)
Dept: PULMONOLOGY | Facility: HOSPITAL | Age: 53
End: 2018-12-31

## 2018-12-31 DIAGNOSIS — G47.33 OSA (OBSTRUCTIVE SLEEP APNEA): Primary | ICD-10-CM

## 2018-12-31 NOTE — TELEPHONE ENCOUNTER
----- Message from Yasmin Uribe sent at 12/27/2018  1:45 PM CST -----  Regarding: Yasmin with [employer]   Contact: [phone]  Dr. Doyle,     Patient called to get replace supplies. Patient is on a nasal mask and needs all other supplies. He would like to get them before the new year. He last saw you in September but there is no order. Please advise if he needs a new visit or if order was created so I am able to obtain an authorization and have him serviced before the 1st.      Thank you,    Yasmin

## 2019-01-09 ENCOUNTER — PATIENT MESSAGE (OUTPATIENT)
Dept: INTERNAL MEDICINE | Facility: CLINIC | Age: 54
End: 2019-01-09

## 2019-01-09 DIAGNOSIS — I10 ESSENTIAL HYPERTENSION: Primary | Chronic | ICD-10-CM

## 2019-01-10 ENCOUNTER — PATIENT MESSAGE (OUTPATIENT)
Dept: ADMINISTRATIVE | Facility: OTHER | Age: 54
End: 2019-01-10

## 2019-01-15 ENCOUNTER — PATIENT MESSAGE (OUTPATIENT)
Dept: ADMINISTRATIVE | Facility: OTHER | Age: 54
End: 2019-01-15

## 2019-01-22 ENCOUNTER — PATIENT OUTREACH (OUTPATIENT)
Dept: OTHER | Facility: OTHER | Age: 54
End: 2019-01-22

## 2019-01-22 NOTE — LETTER
Briana Pollard PharmD  [address] [place] [address]     Dear Geo Lang,    Welcome to the [facility]!           My name is Briana Pollard PharmD and I am your dedicated Digital Medicine clinician.  As an expert in medication management, I will help ensure that the medications you are taking continue to provide you with the intended benefits.        I am Leah Wilks and I will be your health  for the duration of the program.  My  job is to help you identify lifestyle changes to improve your blood pressure control.  We will talk about nutrition, exercise, and other ways that you may be able to adjust your current habits to better your health. Together, we will work to improve your overall health and encourage you to meet your goals for a healthier lifestyle.    What we expect from YOU:    You will need to take blood pressure readings multiple times a week and no less than one reading per week.   It is important that you take your measurements at different times during the day, when possible.     What you should expect from your Digital Medicine Care Team:   We will provide you with education about high blood pressure, including lifestyle changes that could help you to control your blood pressure.   We will review your weekly readings and provide you with monthly blood pressure progress reports after you have been in the program for more than 30 days.   We will send monthly progress reports on your blood pressure control to your physician so they can follow along with your progress as well.    You will be able to reach me by phone at  or through your [facility] account by clicking my name under Care Team on the right side of the home screen.    I look forward to working with you to achieve your blood pressure goals!    Sincerely,    Briana Pollard PharmD  Your personal clinician    Please visit [email or web address] to learn more  about high blood pressure and what you can do lower your blood pressure.                                                                                           Geo Lang  [address] [facility] [address]

## 2019-01-22 NOTE — PROGRESS NOTES
Digital Medicine Enrollment Call    Introduced Mr. Geo Lang to Digital Medicine.     Discussed program expectations and requirements.    Introduced digital medicine care team.     Reviewed the importance of self-monitoring for digital medicine participation.     Reviewed that the Digital Medicine team is not available for emergencies and instructed the patient to call 911 or [facility] ([phone] or [phone]) if one arises.                    Last 5 Patient Entered Readings                                      Current 30 Day Average: 141/97     Recent Readings 1/21/2019 1/21/2019 1/21/2019 1/21/2019    SBP (mmHg) 141 156 163 158    DBP (mmHg) 93 93 99 104    Pulse 85 86 84 67

## 2019-01-22 NOTE — LETTER
Briana Pollard PharmD  [address] [place] [address]     Dear Geo Lang,    Welcome to the [facility]!           My name is Briana Pollard PharmD and I am your dedicated Digital Medicine clinician.  As an expert in medication management, I will help ensure that the medications you are taking continue to provide you with the intended benefits.        I am Leah Wilks and I will be your health  for the duration of the program.  My  job is to help you identify lifestyle changes to improve your blood pressure control.  We will talk about nutrition, exercise, and other ways that you may be able to adjust your current habits to better your health. Together, we will work to improve your overall health and encourage you to meet your goals for a healthier lifestyle.    What we expect from YOU:    You will need to take blood pressure readings multiple times a week and no less than one reading per week.   It is important that you take your measurements at different times during the day, when possible.     What you should expect from your Digital Medicine Care Team:   We will provide you with education about high blood pressure, including lifestyle changes that could help you to control your blood pressure.   We will review your weekly readings and provide you with monthly blood pressure progress reports after you have been in the program for more than 30 days.   We will send monthly progress reports on your blood pressure control to your physician so they can follow along with your progress as well.    You will be able to reach me by phone at  or through your [facility] account by clicking my name under Care Team on the right side of the home screen.    I look forward to working with you to achieve your blood pressure goals!    Sincerely,  Briana Pollard PharmD  Your personal clinician    Please visit [email or web address] to learn more  about high blood pressure and what you can do lower your blood pressure.                                                                                           Geo Lang  [address] [facility] [address]

## 2019-01-23 ENCOUNTER — PATIENT MESSAGE (OUTPATIENT)
Dept: INTERNAL MEDICINE | Facility: CLINIC | Age: 54
End: 2019-01-23

## 2019-01-24 ENCOUNTER — PATIENT OUTREACH (OUTPATIENT)
Dept: OTHER | Facility: OTHER | Age: 54
End: 2019-01-24

## 2019-01-24 DIAGNOSIS — I10 ESSENTIAL HYPERTENSION: Primary | Chronic | ICD-10-CM

## 2019-01-24 NOTE — PROGRESS NOTES
Last 5 Patient Entered Readings                                      Current 30 Day Average: 153/96     Recent Readings 1/24/2019 1/24/2019 1/23/2019 1/23/2019 1/22/2019    SBP (mmHg) 150 157 160 152 162    DBP (mmHg) 93 99 93 97 96    Pulse 65 67 77 97 62        Hypertension Medications     atenolol (TENORMIN) 50 MG tablet Take 1.5 tablets (75 mg total) by mouth once daily.    hydroCHLOROthiazide (MICROZIDE) 12.5 mg capsule Take 1 capsule (12.5 mg total) by mouth once daily.    losartan (COZAAR) 50 MG tablet Take 1 tablet (50 mg total) by mouth once daily.     Called patient for digital medicine clinical pharmacist introduction. No answer. Voicemail box full. Will call back     Briana Pollard Pharm.D.   Digital Medicine Clinical Pharmacist  [phone]

## 2019-01-28 ENCOUNTER — PATIENT OUTREACH (OUTPATIENT)
Dept: OTHER | Facility: OTHER | Age: 54
End: 2019-01-28

## 2019-01-28 NOTE — PROGRESS NOTES
"Last 5 Patient Entered Readings                                      Current 30 Day Average: 150/94     Recent Readings 1/27/2019 1/26/2019 1/26/2019 1/25/2019 1/25/2019    SBP (mmHg) 141 149 147 148 159    DBP (mmHg) 85 91 94 84 93    Pulse 89 80 83 73 76        Digital Medicine: Health  Introduction    Introduced Mr. Geo Lang to Digital Medicine. Discussed health  role and recommended lifestyle modifications.      Lifestyle Assessment:    Current Dietary Habits(i.e. low sodium, food labels, dining out):   Patient reports that his diet consists of mostly "meat, potatoes, pastas and meat sauces". Patient states that he particularly enjoys "[place]ian foods". Patient reports that he eats a "mix" of fast foods and home cooked meals. Informed patient of importance of limiting sodium intake and effects on BP. Patient expressed high interest in reducing sodium intake. Set SMART goal to decrease to 2,000 mg sodium per day. Resources were sent to his e-mail.     Exercise:    Patient works at [employer] in [employer] office. Reports that he moves around a lot but "not a ton". Tracks activity using [employer]. Discussed importance of exercise on overall health as well as impact on blood pressure.      Alcohol/Tobacco: None    Medication Adherence: has been compliant with the medicaiton regimen. Informed patient that digital medicine pharmacist, Briana, has attempted to reach but his voice mailbox is full. Patient reports the BP has been trending down due to doubling dose of losartan to 100 mg per PCP. Has only been doubling losartan since 1/24.     Other goals: Will continue to encourage patient to increase daily exercise.     Reviewed AHA/AACE recommendations:  Limit sodium intake to <2000mg/day  Recommended CHO intake, 45-65% of daily caloric intake  Perform 150 minutes of physical activity per week    Reviewed the importance of self-monitoring, medication adherence, and that the health  can be used as a " resource for lifestyle modifications to help reduce or maintain a healthy lifestyle.  Reviewed that the Digital Medicine team is not available for emergencies and instructed the patient to call 911 or [facility] ([phone] or [phone]) if one arises.

## 2019-01-29 RX ORDER — LOSARTAN POTASSIUM 100 MG/1
100 TABLET ORAL DAILY
Qty: 30 TABLET | Refills: 5 | Status: SHIPPED | OUTPATIENT
Start: 2019-01-29 | End: 2019-07-10 | Stop reason: SDUPTHER

## 2019-01-29 RX ORDER — CHLORTHALIDONE 25 MG/1
TABLET ORAL
Qty: 30 TABLET | Refills: 2 | Status: SHIPPED | OUTPATIENT
Start: 2019-01-29 | End: 2019-07-15 | Stop reason: SDUPTHER

## 2019-01-29 NOTE — PROGRESS NOTES
Last 5 Patient Entered Readings                                      Current 30 Day Average: 150/92     Recent Readings 1/28/2019 1/28/2019 1/28/2019 1/27/2019 1/26/2019    SBP (mmHg) 146 162 156 141 149    DBP (mmHg) 81 91 85 85 91    Pulse 68 68 83 89 80        Hypertension Medications     atenolol (TENORMIN) 50 MG tablet Take 1.5 tablets (75 mg total) by mouth once daily.    hydroCHLOROthiazide (MICROZIDE) 12.5 mg capsule Take 1 capsule (12.5 mg total) by mouth once daily.    losartan (COZAAR) 50 MG tablet Take 1 tablet (50 mg total) by mouth once daily     Called patient for digital medicine clinical pharmacist introduction. Patient seems to be anxious about his BP and focused on getting his BP controlled. He was recently switched from lisinopril to losartan 50 mg. For the past week or so, he has been taking losartan 100 mg QD. Doesn't complain of any side effects from medication.     CALVIN- diagnosed. Wears CPAP most nights.     1) BP exceeds goal of <130/<80. Continue current BP medications, except stop HCTZ. Start chlorthalidone 12.5 mg QD  2) Discussed proper BP monitoring technique and BP goal  3) Patient knows to contact me with any non-urgent clinical changes or concerns. Go to ED or call [facility] for emergencies.   4) Will defer lifestyle counseling to digital medicine health .   5) All of patient's questions were answered. Will continue to follow up.     Briana Pollard, Pharm.D.   Digital Medicine Clinical Pharmacist  [phone]

## 2019-01-31 ENCOUNTER — PATIENT OUTREACH (OUTPATIENT)
Dept: OTHER | Facility: OTHER | Age: 54
End: 2019-01-31

## 2019-01-31 NOTE — PROGRESS NOTES
"Last 5 Patient Entered Readings                                      Current 30 Day Average: 149/92     Recent Readings 1/30/2019 1/30/2019 1/29/2019 1/29/2019 1/28/2019    SBP (mmHg) 146 170 148 162 146    DBP (mmHg) 91 85 89 95 81    Pulse 94 97 71 69 68        Hypertension Medications     atenolol (TENORMIN) 50 MG tablet Take 1.5 tablets (75 mg total) by mouth once daily.    chlorthalidone (HYGROTEN) 25 MG Tab Take 0.5 tablet (12.5 mg) by mouth once daily for blood pressure    losartan (COZAAR) 100 MG tablet Take 1 tablet (100 mg total) by mouth once daily.     Patient called me to discuss ordering lab work. He spoke with insurance company and determined that [facility] would be cheaper than [facility]. He will get back with me to determine where he prefers to go. He says PCP wants to check lipids and A1c. I will order CMP when patient tells me his preferred location for labs. He took doses of chlorthalidone. He says that he experienced a little itching on his finger that went away. He says this may be "psychosomatic."     1) BP exceeds goal of <130/<80. Continue current BP medications.   2) Monitor itching and report if worsens or fails to improve.   3) Patient knows to contact me with any non-urgent clinical changes or concerns. Go to ED or call [facility] for emergencies.   4) Will defer lifestyle counseling to digital medicine health .   5) Will continue to follow up.     Briana Pollard, Pharm.D.   Digital Medicine Clinical Pharmacist  [phone]        "

## 2019-02-04 ENCOUNTER — PATIENT MESSAGE (OUTPATIENT)
Dept: INTERNAL MEDICINE | Facility: CLINIC | Age: 54
End: 2019-02-04

## 2019-02-04 DIAGNOSIS — Z00.00 ROUTINE ADULT HEALTH MAINTENANCE: Primary | ICD-10-CM

## 2019-02-12 ENCOUNTER — PATIENT OUTREACH (OUTPATIENT)
Dept: OTHER | Facility: OTHER | Age: 54
End: 2019-02-12

## 2019-02-12 DIAGNOSIS — I10 ESSENTIAL HYPERTENSION: Primary | Chronic | ICD-10-CM

## 2019-02-12 NOTE — PROGRESS NOTES
Last 5 Patient Entered Readings                                      Current 30 Day Average: 142/89     Recent Readings 2/11/2019 2/10/2019 2/9/2019 2/9/2019 2/8/2019    SBP (mmHg) 135 123 136 142 124    DBP (mmHg) 87 79 79 83 85    Pulse 75 74 66 62 81        Hypertension Medications     atenolol (TENORMIN) 50 MG tablet Take 1.5 tablets (75 mg total) by mouth once daily.    chlorthalidone (HYGROTEN) 25 MG Tab Take 0.5 tablet (12.5 mg) by mouth once daily for blood pressure    losartan (COZAAR) 100 MG tablet Take 1 tablet (100 mg total) by mouth once daily.     Patient called to clarify what labs were ordered. [facility]. He says he is doing well on current BP medications and notices his BP is trending down. Patient knows to contact me with any non-urgent clinical changes or concerns. Go to ED or call [facility] for emergencies. I will continue to follow up.     Braina Pollard, Pharm.D.   [facility] Clinical Pharmacist  [phone]

## 2019-02-14 NOTE — PROGRESS NOTES
Last 5 Patient Entered Readings                                      Current 30 Day Average: 142/89     Recent Readings 2/11/2019 2/10/2019 2/9/2019 2/9/2019 2/8/2019    SBP (mmHg) 135 123 136 142 124    DBP (mmHg) 87 79 79 83 85    Pulse 75 74 66 62 81        Patient called to inform me that he went to [facility] this morning and they did not have an order for BMP. I sent the order again via fax. Patient says he is not sure if he wants to get labs at [facility] or [facility]. He will call insurance company today and let me know.     Briana Pollard, Pharm.D.  [facility] Medicine Clinical Pharmacist  [phone]

## 2019-02-14 NOTE — PROGRESS NOTES
Last 5 Patient Entered Readings                                      Current 30 Day Average: 142/89     Recent Readings 2/11/2019 2/10/2019 2/9/2019 2/9/2019 2/8/2019    SBP (mmHg) 135 123 136 142 124    DBP (mmHg) 87 79 79 83 85    Pulse 75 74 66 62 81        Patient called to inform me that he prefers to get lab work at [facility]. Will link BMP to labs ordered for vivien.     Carolina Navarro.D.   [facility] Medicine Clinical Pharmacist  [phone]

## 2019-02-15 ENCOUNTER — PATIENT MESSAGE (OUTPATIENT)
Dept: INTERNAL MEDICINE | Facility: CLINIC | Age: 54
End: 2019-02-15

## 2019-02-15 ENCOUNTER — TELEPHONE (OUTPATIENT)
Dept: INTERNAL MEDICINE | Facility: CLINIC | Age: 54
End: 2019-02-15

## 2019-02-15 ENCOUNTER — LAB VISIT (OUTPATIENT)
Dept: LAB | Facility: HOSPITAL | Age: 54
End: 2019-02-15
Attending: INTERNAL MEDICINE
Payer: COMMERCIAL

## 2019-02-15 DIAGNOSIS — E11.9 TYPE 2 DIABETES MELLITUS WITHOUT COMPLICATION, WITHOUT LONG-TERM CURRENT USE OF INSULIN: Chronic | ICD-10-CM

## 2019-02-15 DIAGNOSIS — I10 ESSENTIAL HYPERTENSION: Chronic | ICD-10-CM

## 2019-02-15 DIAGNOSIS — Z00.00 ROUTINE ADULT HEALTH MAINTENANCE: ICD-10-CM

## 2019-02-15 DIAGNOSIS — Z00.00 ROUTINE PHYSICAL EXAMINATION: ICD-10-CM

## 2019-02-15 DIAGNOSIS — Z12.5 SCREENING FOR PROSTATE CANCER: ICD-10-CM

## 2019-02-15 LAB
ANION GAP SERPL CALC-SCNC: 9 MMOL/L
BUN SERPL-MCNC: 14 MG/DL
CALCIUM SERPL-MCNC: 9.6 MG/DL
CHLORIDE SERPL-SCNC: 105 MMOL/L
CHOLEST SERPL-MCNC: 108 MG/DL
CHOLEST/HDLC SERPL: 3 {RATIO}
CO2 SERPL-SCNC: 27 MMOL/L
COMPLEXED PSA SERPL-MCNC: 0.57 NG/ML
CREAT SERPL-MCNC: 0.9 MG/DL
EST. GFR  (AFRICAN AMERICAN): >60 ML/MIN/1.73 M^2
EST. GFR  (NON AFRICAN AMERICAN): >60 ML/MIN/1.73 M^2
ESTIMATED AVG GLUCOSE: 140 MG/DL
GLUCOSE SERPL-MCNC: 116 MG/DL
HBA1C MFR BLD HPLC: 6.5 %
HDLC SERPL-MCNC: 36 MG/DL
HDLC SERPL: 33.3 %
LDLC SERPL CALC-MCNC: 52.8 MG/DL
NONHDLC SERPL-MCNC: 72 MG/DL
POTASSIUM SERPL-SCNC: 3.5 MMOL/L
SODIUM SERPL-SCNC: 141 MMOL/L
TRIGL SERPL-MCNC: 96 MG/DL

## 2019-02-15 PROCEDURE — 80048 BASIC METABOLIC PNL TOTAL CA: CPT

## 2019-02-15 PROCEDURE — 84153 ASSAY OF PSA TOTAL: CPT

## 2019-02-15 PROCEDURE — 83036 HEMOGLOBIN GLYCOSYLATED A1C: CPT

## 2019-02-15 PROCEDURE — 80061 LIPID PANEL: CPT

## 2019-02-15 NOTE — TELEPHONE ENCOUNTER
----- Message from Marita Coles sent at 2/15/2019  9:40 AM CST -----  BMP need to be fixed in Epic ASAP, pt is waiting

## 2019-02-15 NOTE — PROGRESS NOTES
Last 5 Patient Entered Readings                                      Current 30 Day Average: 142/89     Recent Readings 2/11/2019 2/10/2019 2/9/2019 2/9/2019 2/8/2019    SBP (mmHg) 135 123 136 142 124    DBP (mmHg) 87 79 79 83 85    Pulse 75 74 66 62 81        Patient is currently at [facility] getting lab work.  does not see BMP order. Will re-order.     Amor NavarroD.   [facility] Clinical Pharmacist  [phone]

## 2019-02-15 NOTE — PROGRESS NOTES
Last 5 Patient Entered Readings                                      Current 30 Day Average: 142/89     Recent Readings 2/11/2019 2/10/2019 2/9/2019 2/9/2019 2/8/2019    SBP (mmHg) 135 123 136 142 124    DBP (mmHg) 87 79 79 83 85    Pulse 75 74 66 62 81        Hypertension Medications     atenolol (TENORMIN) 50 MG tablet Take 1.5 tablets (75 mg total) by mouth once daily.    chlorthalidone (HYGROTEN) 25 MG Tab Take 0.5 tablet (12.5 mg) by mouth once daily for blood pressure    losartan (COZAAR) 100 MG tablet Take 1 tablet (100 mg total) by mouth once daily.     Patient left me a voicemail asking me to call back. I called to follow up. He wanted to discuss lab work that was done today. He says he is doing well on the chlorthalidone.     1) BP exceeds goal of <130/<80, but trending down. Continue current BP medications.   2) Reviewed last labs. K 3.5 after starting chlorthalidone. Encouraged patient to increase dietary sources of K  3) Patient knows to contact me with any non-urgent clinical changes or concerns. Go to ED or call [facility] for emergencies.   4) Will defer lifestyle counseling to digital medicine health .   5) Will continue to follow up. Will consider increasing chlorthalidone or switching atenolol to carvedilol     Briana Pollard, Pharm.D.   Digital Medicine Clinical Pharmacist  [phone]

## 2019-02-21 ENCOUNTER — PATIENT OUTREACH (OUTPATIENT)
Dept: OTHER | Facility: OTHER | Age: 54
End: 2019-02-21

## 2019-02-21 NOTE — PROGRESS NOTES
Last 5 Patient Entered Readings                                      Current 30 Day Average: 139/86     Recent Readings 2/20/2019 2/19/2019 2/18/2019 2/18/2019 2/17/2019    SBP (mmHg) 128 128 137 148 128    DBP (mmHg) 83 82 80 87 74    Pulse 83 78 66 77 66      Phone call brief.     Digital Medicine: Health  Follow Up    Lifestyle Modifications:    1.Dietary Modifications (Sodium intake <2,000mg/day, food labels, dining out): Patient reports that he has been reading food labels.     2.Physical Activity: Patient has increased daily activity at work.     3.Medication Therapy: Patient has been compliant with the medication regimen.    4.Patient has the following medication side effects/concerns: None   (Frequency/Alleviating factors/Precipitating factors, etc.)     Follow up with Mr. Geo Lang completed. No further questions or concerns. Will continue to follow up to achieve health goals.

## 2019-03-07 ENCOUNTER — PATIENT MESSAGE (OUTPATIENT)
Dept: INTERNAL MEDICINE | Facility: CLINIC | Age: 54
End: 2019-03-07

## 2019-03-12 ENCOUNTER — OFFICE VISIT (OUTPATIENT)
Dept: INTERNAL MEDICINE | Facility: CLINIC | Age: 54
End: 2019-03-12
Payer: COMMERCIAL

## 2019-03-12 ENCOUNTER — PATIENT MESSAGE (OUTPATIENT)
Dept: INTERNAL MEDICINE | Facility: CLINIC | Age: 54
End: 2019-03-12

## 2019-03-12 VITALS
WEIGHT: 249.81 LBS | HEART RATE: 77 BPM | OXYGEN SATURATION: 97 % | BODY MASS INDEX: 34.97 KG/M2 | SYSTOLIC BLOOD PRESSURE: 122 MMHG | HEIGHT: 71 IN | DIASTOLIC BLOOD PRESSURE: 84 MMHG

## 2019-03-12 DIAGNOSIS — I10 ESSENTIAL HYPERTENSION: Chronic | ICD-10-CM

## 2019-03-12 DIAGNOSIS — J20.9 ACUTE BRONCHITIS, UNSPECIFIED ORGANISM: Primary | ICD-10-CM

## 2019-03-12 DIAGNOSIS — E11.9 TYPE 2 DIABETES MELLITUS WITHOUT COMPLICATION, WITHOUT LONG-TERM CURRENT USE OF INSULIN: Chronic | ICD-10-CM

## 2019-03-12 PROCEDURE — 3008F PR BODY MASS INDEX (BMI) DOCUMENTED: ICD-10-PCS | Mod: CPTII,S$GLB,, | Performed by: INTERNAL MEDICINE

## 2019-03-12 PROCEDURE — 3079F PR MOST RECENT DIASTOLIC BLOOD PRESSURE 80-89 MM HG: ICD-10-PCS | Mod: CPTII,S$GLB,, | Performed by: INTERNAL MEDICINE

## 2019-03-12 PROCEDURE — 99213 PR OFFICE/OUTPT VISIT, EST, LEVL III, 20-29 MIN: ICD-10-PCS | Mod: S$GLB,,, | Performed by: INTERNAL MEDICINE

## 2019-03-12 PROCEDURE — 3044F PR MOST RECENT HEMOGLOBIN A1C LEVEL <7.0%: ICD-10-PCS | Mod: CPTII,S$GLB,, | Performed by: INTERNAL MEDICINE

## 2019-03-12 PROCEDURE — 99213 OFFICE O/P EST LOW 20 MIN: CPT | Mod: S$GLB,,, | Performed by: INTERNAL MEDICINE

## 2019-03-12 PROCEDURE — 99999 PR PBB SHADOW E&M-EST. PATIENT-LVL III: ICD-10-PCS | Mod: PBBFAC,,, | Performed by: INTERNAL MEDICINE

## 2019-03-12 PROCEDURE — 3074F SYST BP LT 130 MM HG: CPT | Mod: CPTII,S$GLB,, | Performed by: INTERNAL MEDICINE

## 2019-03-12 PROCEDURE — 3044F HG A1C LEVEL LT 7.0%: CPT | Mod: CPTII,S$GLB,, | Performed by: INTERNAL MEDICINE

## 2019-03-12 PROCEDURE — 99999 PR PBB SHADOW E&M-EST. PATIENT-LVL III: CPT | Mod: PBBFAC,,, | Performed by: INTERNAL MEDICINE

## 2019-03-12 PROCEDURE — 3079F DIAST BP 80-89 MM HG: CPT | Mod: CPTII,S$GLB,, | Performed by: INTERNAL MEDICINE

## 2019-03-12 PROCEDURE — 3074F PR MOST RECENT SYSTOLIC BLOOD PRESSURE < 130 MM HG: ICD-10-PCS | Mod: CPTII,S$GLB,, | Performed by: INTERNAL MEDICINE

## 2019-03-12 PROCEDURE — 3008F BODY MASS INDEX DOCD: CPT | Mod: CPTII,S$GLB,, | Performed by: INTERNAL MEDICINE

## 2019-03-12 NOTE — PROGRESS NOTES
Subjective:       Patient ID: Geo Lang is a 53 y.o. male.    Chief Complaint: Hospital Follow Up    Patient here for ER follow-up diagnosis bronchitis.  He was given steroids and antibiotics and has.  Sugars remained fairly stable.  He denies any active or acute.  No chest pains or shortness of breath fevers or chills.  Cough or wheeze.      Review of Systems   Constitutional: Negative for chills, fatigue, fever and unexpected weight change.   HENT: Negative for nosebleeds and trouble swallowing.    Eyes: Negative for pain and visual disturbance.   Respiratory: Negative for cough, shortness of breath and wheezing.    Cardiovascular: Negative for chest pain and palpitations.   Gastrointestinal: Negative for abdominal pain, constipation, diarrhea, nausea and vomiting.   Genitourinary: Negative for difficulty urinating and hematuria.   Musculoskeletal: Negative for neck pain.   Skin: Negative for rash.   Neurological: Negative for dizziness and headaches.   Hematological: Does not bruise/bleed easily.   Psychiatric/Behavioral: Negative for dysphoric mood, sleep disturbance and suicidal ideas.       Objective:      Physical Exam   Constitutional: He is oriented to person, place, and time. He appears well-developed and well-nourished. No distress.   HENT:   Head: Normocephalic and atraumatic.   Right Ear: External ear normal.   Left Ear: External ear normal.   Mouth/Throat: Oropharynx is clear and moist. No oropharyngeal exudate.   TM's clear, pharynx clear   Eyes: Conjunctivae and EOM are normal. Pupils are equal, round, and reactive to light. No scleral icterus.   Neck: Normal range of motion. Neck supple. No thyromegaly present.   No supraclavicular nodes palpated   Cardiovascular: Normal rate, regular rhythm and normal heart sounds.   No murmur heard.  Pulmonary/Chest: Effort normal and breath sounds normal. He has no wheezes.   Abdominal: Soft. Bowel sounds are normal. He exhibits no mass. There is no  tenderness.   Musculoskeletal: He exhibits no edema.   Lymphadenopathy:     He has no cervical adenopathy.   Neurological: He is alert and oriented to person, place, and time.   Skin: No rash noted. No erythema. No pallor.   Psychiatric: He has a normal mood and affect. His behavior is normal.       Assessment:       1. Acute bronchitis, unspecified organism    2. Type 2 diabetes mellitus without complication, without long-term current use of insulin    3. Essential hypertension        Plan:       Geo was seen today for hospital follow up.    Diagnoses and all orders for this visit:    Acute bronchitis, unspecified organism    Type 2 diabetes mellitus without complication, without long-term current use of insulin    Essential hypertension          Clinically improved, continue current plan

## 2019-03-13 ENCOUNTER — PATIENT OUTREACH (OUTPATIENT)
Dept: OTHER | Facility: OTHER | Age: 54
End: 2019-03-13

## 2019-03-13 NOTE — PROGRESS NOTES
Last 5 Patient Entered Readings                                      Current 30 Day Average: 130/78     Recent Readings 3/12/2019 3/11/2019 3/10/2019 3/9/2019 3/8/2019    SBP (mmHg) 110 115 112 128 139    DBP (mmHg) 73 71 66 75 78    Pulse 78 69 62 66 68        Hypertension Medications     atenolol (TENORMIN) 50 MG tablet Take 1.5 tablets (75 mg total) by mouth once daily.    chlorthalidone (HYGROTEN) 25 MG Tab Take 0.5 tablet (12.5 mg) by mouth once daily for blood pressure    losartan (COZAAR) 100 MG tablet Take 1 tablet (100 mg total) by mouth once daily.     Called patient for BP follow up. He is doing well on current therapy. No complaints. He says that he has been trying to decrease sodium intake and exercising.     1) BP exceeds goal of <130/<80. Continue current BP medications. Reviewed last BMP  2) Patient knows to contact me with any non-urgent clinical changes or concerns. Go to ED or call [facility] for emergencies.   3) Will defer lifestyle counseling to digital medicine health .   4) Will continue to follow up.     Briana Pollard, Pharm.D.   Digital Medicine Clinical Pharmacist  [phone]

## 2019-03-14 ENCOUNTER — PATIENT OUTREACH (OUTPATIENT)
Dept: OTHER | Facility: OTHER | Age: 54
End: 2019-03-14

## 2019-03-14 NOTE — PROGRESS NOTES
Last 5 Patient Entered Readings                                      Current 30 Day Average: 129/78     Recent Readings 3/14/2019 3/12/2019 3/11/2019 3/10/2019 3/9/2019    SBP (mmHg) 123 110 115 112 128    DBP (mmHg) 76 73 71 66 75    Pulse 61 78 69 62 66        Digital Medicine: Health  Follow Up    Lifestyle Modifications:    1.Dietary Modifications (Sodium intake <2,000mg/day, food labels, dining out): Patient reports he is continuing to read food labels, and choosing items with less 140 mg sodium per serving. Praised patient for improved BP readings and encouraged him to maintain healthy eating habits.     2.Physical Activity: Patient reports icreased activity at work and home. Will work with patient next call to develop an exercise plan.     3.Medication Therapy: Patient has been compliant with the medication regimen.    4.Patient has the following medication side effects/concerns: None   (Frequency/Alleviating factors/Precipitating factors, etc.)     Follow up with Mr. Geo Lang completed. No further questions or concerns. Will continue to follow up to achieve health goals.

## 2019-03-28 ENCOUNTER — PATIENT OUTREACH (OUTPATIENT)
Dept: OTHER | Facility: OTHER | Age: 54
End: 2019-03-28

## 2019-03-28 NOTE — PROGRESS NOTES
Last 5 Patient Entered Readings                                      Current 30 Day Average: 126/77     Recent Readings 3/27/2019 3/26/2019 3/25/2019 3/24/2019 3/23/2019    SBP (mmHg) 128 127 127 122 129    DBP (mmHg) 81 81 83 79 73    Pulse 70 68 77 73 72        Hypertension Medications     atenolol (TENORMIN) 50 MG tablet Take 1.5 tablets (75 mg total) by mouth once daily.    chlorthalidone (HYGROTEN) 25 MG Tab Take 0.5 tablet (12.5 mg) by mouth once daily for blood pressure    losartan (COZAAR) 100 MG tablet Take 1 tablet (100 mg total) by mouth once daily.     Called patient for BP follow up. He is doing well with no complaints.     1) BP meets goal of <130/<80. Continue current BP medications.   2) Patient knows to contact me with any non-urgent clinical changes or concerns. Go to ED or call [facility] for emergencies.   3) Will defer lifestyle counseling to digital medicine health .   4) Will continue to follow up.     Briana Pollard, Pharm.D.   Digital Medicine Clinical Pharmacist  [phone]

## 2019-04-18 ENCOUNTER — PATIENT OUTREACH (OUTPATIENT)
Dept: OTHER | Facility: OTHER | Age: 54
End: 2019-04-18

## 2019-04-18 NOTE — PROGRESS NOTES
Last 5 Patient Entered Readings                                      Current 30 Day Average: 126/80     Recent Readings 4/16/2019 4/15/2019 4/15/2019 4/14/2019 4/13/2019    SBP (mmHg) 125 140 136 139 126    DBP (mmHg) 85 87 92 86 86    Pulse 86 86 90 75 79

## 2019-05-10 NOTE — PROGRESS NOTES
Last 5 Patient Entered Readings                                      Current 30 Day Average: 128/81     Recent Readings 5/10/2019 5/8/2019 5/6/2019 5/2/2019 4/30/2019    SBP (mmHg) 140 134 130 123 116    DBP (mmHg) 82 82 72 81 76    Pulse 65 81 53 80 74          Sent [employer] message, will f/u in 2 weeks.

## 2019-06-19 ENCOUNTER — OFFICE VISIT (OUTPATIENT)
Dept: INTERNAL MEDICINE | Facility: CLINIC | Age: 54
End: 2019-06-19
Payer: COMMERCIAL

## 2019-06-19 ENCOUNTER — PATIENT MESSAGE (OUTPATIENT)
Dept: INTERNAL MEDICINE | Facility: CLINIC | Age: 54
End: 2019-06-19

## 2019-06-19 VITALS
OXYGEN SATURATION: 97 % | SYSTOLIC BLOOD PRESSURE: 132 MMHG | HEIGHT: 71 IN | DIASTOLIC BLOOD PRESSURE: 82 MMHG | HEART RATE: 83 BPM | BODY MASS INDEX: 35.4 KG/M2 | WEIGHT: 252.88 LBS

## 2019-06-19 DIAGNOSIS — G47.33 OSA (OBSTRUCTIVE SLEEP APNEA): Chronic | ICD-10-CM

## 2019-06-19 DIAGNOSIS — E78.2 MIXED HYPERLIPIDEMIA: Chronic | ICD-10-CM

## 2019-06-19 DIAGNOSIS — I10 ESSENTIAL HYPERTENSION: Chronic | ICD-10-CM

## 2019-06-19 DIAGNOSIS — E11.9 TYPE 2 DIABETES MELLITUS WITHOUT COMPLICATION, WITHOUT LONG-TERM CURRENT USE OF INSULIN: Chronic | ICD-10-CM

## 2019-06-19 DIAGNOSIS — K21.9 GASTROESOPHAGEAL REFLUX DISEASE WITHOUT ESOPHAGITIS: ICD-10-CM

## 2019-06-19 DIAGNOSIS — R51.9 NONINTRACTABLE HEADACHE, UNSPECIFIED CHRONICITY PATTERN, UNSPECIFIED HEADACHE TYPE: Primary | ICD-10-CM

## 2019-06-19 DIAGNOSIS — M54.41 ACUTE RIGHT-SIDED LOW BACK PAIN WITH RIGHT-SIDED SCIATICA: ICD-10-CM

## 2019-06-19 PROCEDURE — 3008F PR BODY MASS INDEX (BMI) DOCUMENTED: ICD-10-PCS | Mod: CPTII,S$GLB,, | Performed by: INTERNAL MEDICINE

## 2019-06-19 PROCEDURE — 3044F HG A1C LEVEL LT 7.0%: CPT | Mod: CPTII,S$GLB,, | Performed by: INTERNAL MEDICINE

## 2019-06-19 PROCEDURE — 99214 PR OFFICE/OUTPT VISIT, EST, LEVL IV, 30-39 MIN: ICD-10-PCS | Mod: S$GLB,,, | Performed by: INTERNAL MEDICINE

## 2019-06-19 PROCEDURE — 3008F BODY MASS INDEX DOCD: CPT | Mod: CPTII,S$GLB,, | Performed by: INTERNAL MEDICINE

## 2019-06-19 PROCEDURE — 3075F SYST BP GE 130 - 139MM HG: CPT | Mod: CPTII,S$GLB,, | Performed by: INTERNAL MEDICINE

## 2019-06-19 PROCEDURE — 99214 OFFICE O/P EST MOD 30 MIN: CPT | Mod: S$GLB,,, | Performed by: INTERNAL MEDICINE

## 2019-06-19 PROCEDURE — 99999 PR PBB SHADOW E&M-EST. PATIENT-LVL III: CPT | Mod: PBBFAC,,, | Performed by: INTERNAL MEDICINE

## 2019-06-19 PROCEDURE — 99999 PR PBB SHADOW E&M-EST. PATIENT-LVL III: ICD-10-PCS | Mod: PBBFAC,,, | Performed by: INTERNAL MEDICINE

## 2019-06-19 PROCEDURE — 3044F PR MOST RECENT HEMOGLOBIN A1C LEVEL <7.0%: ICD-10-PCS | Mod: CPTII,S$GLB,, | Performed by: INTERNAL MEDICINE

## 2019-06-19 PROCEDURE — 3079F PR MOST RECENT DIASTOLIC BLOOD PRESSURE 80-89 MM HG: ICD-10-PCS | Mod: CPTII,S$GLB,, | Performed by: INTERNAL MEDICINE

## 2019-06-19 PROCEDURE — 3075F PR MOST RECENT SYSTOLIC BLOOD PRESS GE 130-139MM HG: ICD-10-PCS | Mod: CPTII,S$GLB,, | Performed by: INTERNAL MEDICINE

## 2019-06-19 PROCEDURE — 3079F DIAST BP 80-89 MM HG: CPT | Mod: CPTII,S$GLB,, | Performed by: INTERNAL MEDICINE

## 2019-06-19 RX ORDER — METAXALONE 800 MG/1
800 TABLET ORAL NIGHTLY
Qty: 15 TABLET | Refills: 1 | Status: SHIPPED | OUTPATIENT
Start: 2019-06-19 | End: 2022-01-24

## 2019-06-19 NOTE — PROGRESS NOTES
Subjective:       Patient ID: Geo Lang is a 53 y.o. male.    Chief Complaint: Headache and Back Pain    Patient comes in to discuss headache and back pain. Says he actually believes some of the headache may relate to stress particularly at work.  But he says he does not have headaches much or at least does not notice than much at work.  He denies any change in his vision.  No photophobia.  No hearing or visual loss.  He has regular diabetic eye exams.  Headaches have been present for several weeks.  They seem to occur around her above the eyes sometimes at the base of neck.  They may last a few minutes but usually not hours.  Blood pressures been fairly stable.  Sugars have been stable.  Coincidentally or not the headache started about the time he started chlorthalidone.  He denies any GI or  complaints.  He is having some back pain and feels that has been going on for several weeks.  He thinks it was strain and is a little better but he has stiffness arising from a seated or lying position.    Review of Systems   Constitutional: Negative for chills, fatigue, fever and unexpected weight change.   HENT: Negative for nosebleeds and trouble swallowing.    Eyes: Negative for pain and visual disturbance.   Respiratory: Negative for cough, shortness of breath and wheezing.    Cardiovascular: Negative for chest pain and palpitations.   Gastrointestinal: Negative for abdominal pain, constipation, diarrhea, nausea and vomiting.   Genitourinary: Negative for difficulty urinating and hematuria.   Musculoskeletal: Positive for back pain. Negative for neck pain.   Skin: Negative for rash.   Neurological: Positive for headaches. Negative for dizziness.   Hematological: Does not bruise/bleed easily.   Psychiatric/Behavioral: Negative for dysphoric mood, sleep disturbance and suicidal ideas.       Objective:      Physical Exam   Constitutional: He is oriented to person, place, and time. He appears well-developed and  well-nourished. No distress.   HENT:   Head: Normocephalic and atraumatic.   Right Ear: External ear normal.   Left Ear: External ear normal.   Mouth/Throat: Oropharynx is clear and moist. No oropharyngeal exudate.   TM's clear, pharynx clear  No tenderness palpation of the skull or scalp.  Mild neck stiffness.   Eyes: Pupils are equal, round, and reactive to light. Conjunctivae and EOM are normal. No scleral icterus.   Neck: Normal range of motion. No thyromegaly present.   No supraclavicular nodes palpated   Cardiovascular: Normal rate, regular rhythm and normal heart sounds.   No murmur heard.  Pulmonary/Chest: Effort normal and breath sounds normal. He has no wheezes.   Abdominal: Soft. Bowel sounds are normal. He exhibits no mass. There is no tenderness.   Musculoskeletal: He exhibits no edema or tenderness (No tender points in the low back).   Lymphadenopathy:     He has no cervical adenopathy.   Neurological: He is alert and oriented to person, place, and time.   Skin: No rash noted. No erythema. No pallor.   Psychiatric: He has a normal mood and affect. His behavior is normal.       Assessment:       1. Nonintractable headache, unspecified chronicity pattern, unspecified headache type    2. Type 2 diabetes mellitus without complication, without long-term current use of insulin    3. Gastroesophageal reflux disease without esophagitis    4. CALVIN (obstructive sleep apnea)    5. Mixed hyperlipidemia    6. Essential hypertension    7. Acute right-sided low back pain with right-sided sciatica        Plan:       Geo was seen today for headache and back pain.    Diagnoses and all orders for this visit:    Nonintractable headache, unspecified chronicity pattern, unspecified headache type  -     CBC auto differential; Future  -     Basic metabolic panel; Future    Type 2 diabetes mellitus without complication, without long-term current use of insulin  -     CBC auto differential; Future  -     Basic metabolic panel;  Future    Gastroesophageal reflux disease without esophagitis    CALVIN (obstructive sleep apnea)    Mixed hyperlipidemia    Essential hypertension    Acute right-sided low back pain with right-sided sciatica    Other orders  -     metaxalone (SKELAXIN) 800 MG tablet; Take 1 tablet (800 mg total) by mouth every evening.        Review electrolytes and follow-up after above.  Consider head scan if headaches not improving

## 2019-07-10 DIAGNOSIS — I10 ESSENTIAL HYPERTENSION: Chronic | ICD-10-CM

## 2019-07-10 RX ORDER — LOSARTAN POTASSIUM 100 MG/1
TABLET ORAL
Qty: 30 TABLET | Refills: 5 | Status: SHIPPED | OUTPATIENT
Start: 2019-07-10 | End: 2019-07-23 | Stop reason: ALTCHOICE

## 2019-07-15 DIAGNOSIS — I10 ESSENTIAL HYPERTENSION: Chronic | ICD-10-CM

## 2019-07-15 RX ORDER — CHLORTHALIDONE 25 MG/1
TABLET ORAL
Qty: 30 TABLET | Refills: 2 | Status: SHIPPED | OUTPATIENT
Start: 2019-07-15 | End: 2020-01-05 | Stop reason: SDUPTHER

## 2019-07-23 ENCOUNTER — PATIENT OUTREACH (OUTPATIENT)
Dept: OTHER | Facility: OTHER | Age: 54
End: 2019-07-23

## 2019-07-23 DIAGNOSIS — I10 ESSENTIAL HYPERTENSION: Primary | Chronic | ICD-10-CM

## 2019-07-23 RX ORDER — VALSARTAN 320 MG/1
320 TABLET ORAL DAILY
Qty: 30 TABLET | Refills: 5 | Status: SHIPPED | OUTPATIENT
Start: 2019-07-23 | End: 2020-01-05 | Stop reason: SDUPTHER

## 2019-07-23 NOTE — PROGRESS NOTES
"Last 5 Patient Entered Readings                                      Current 30 Day Average: 147/86     Recent Readings 7/22/2019 7/21/2019 7/21/2019 7/21/2019 7/16/2019    SBP (mmHg) 162 152 162 155 142    DBP (mmHg) 89 86 96 87 81    Pulse 66 63 73 75 71        Patient called to discuss recent elevations in BP.   Patient attributes elevations in readings to busy work schedule and not eating "well".   Current BP charge at 93%.  Reports he has not been using his CPAP machine, but verbalized intent to resume use today.    Digital Medicine: Health  Follow Up    Lifestyle Modifications:    1.Dietary Modifications (Sodium intake <2,000mg/day, food labels, dining out): Ate chinese food this weekend, noticed elevated sodium levels in dish. Patient is making efforts to reduce daily sodium intake. Reports efforts to switch to "No salt" and "lite salt" subsitutes. Patient asked if potassium chloride in salt substitutes is okay to use, will discuss with DM clinician and f/u with patient today.Reports drinking 3-4 16.0-oz bottles per day. Sent requested resources via email per patient request. Will f/u with patient in 3-4 weeks to discuss further.      2.Physical Activity: Deferred    3.Medication Therapy: Patient has been compliant with the medication regimen.    4.Patient has the following medication side effects/concerns: None  (Frequency/Alleviating factors/Precipitating factors, etc.)     Follow up with Mr. Geo Lang completed. No further questions or concerns. Will continue to follow up to achieve health goals.  "

## 2019-08-06 ENCOUNTER — PATIENT OUTREACH (OUTPATIENT)
Dept: OTHER | Facility: OTHER | Age: 54
End: 2019-08-06

## 2019-08-06 NOTE — PROGRESS NOTES
Last 5 Patient Entered Readings                                      Current 30 Day Average: 148/86     Recent Readings 7/29/2019 7/26/2019 7/22/2019 7/21/2019 7/21/2019    SBP (mmHg) 142 145 162 152 162    DBP (mmHg) 76 88 89 86 96    Pulse 59 88 66 63 73        Hypertension Medications     atenolol (TENORMIN) 50 MG tablet Take 1.5 tablets (75 mg total) by mouth once daily.    chlorthalidone (HYGROTEN) 25 MG Tab TAKE 1/2 (ONE-HALF) TABLET BY MOUTH ONCE DAILY FOR BLOOD PRESSURE    valsartan (DIOVAN) 320 MG tablet Take 1 tablet (320 mg total) by mouth once daily.     Called patient for BP follow up after switching losartan to valsartan. He is doing well with no complaints. He says he has been taking his BP on a different machine that he brings with him to work. He notices that BP is slightly lower since switching losartan to valsartan.     1) 30-day BP average exceeds goal of <130/<80. Continue current BP medications. Requested more frequent BP readings using ihealth BP machine  2) Patient knows to contact me with any non-urgent clinical changes or concerns. Go to ED or call [facility] for emergencies.   3) Will defer lifestyle counseling to digital medicine health .   4) Will continue to follow up.       Briana Daniel, Pharm.D.   Digital Medicine Clinical Pharmacist  [phone]

## 2019-08-15 ENCOUNTER — PATIENT OUTREACH (OUTPATIENT)
Dept: OTHER | Facility: OTHER | Age: 54
End: 2019-08-15

## 2019-08-15 NOTE — PROGRESS NOTES
"Last 5 Patient Entered Readings                                      Current 30 Day Average: 143/83     Recent Readings 8/14/2019 8/11/2019 8/6/2019 7/29/2019 7/26/2019    SBP (mmHg) 125 140 135 142 145    DBP (mmHg) 71 77 78 76 88    Pulse 65 69 65 59 88        Patient attributes improvement in reading yesterday to medication and salt monitoring.      Digital Medicine: Health  Follow Up    Lifestyle Modifications:    1.Dietary Modifications (Sodium intake <2,000mg/day, food labels, dining out): Reports continued effort monitor sodium intake, recently switched to "lite salt" subsitutes. Reports drinking 3-4 16.9-oz bottles per day. Encouraged patient to maintain low-sodium diet, will complete food recall next outreach.     2.Physical Activity: Reports increased activity level on weekends, including walking and yard work. Considering about starting exercise routine but "does not like the outside heat or gym". Discussed [employer], patient verbalized intent to download. Patient reports he has also been staying active walking in shopping centers/malls.    3.Medication Therapy: Patient has been compliant with the medication regimen.    4.Patient has the following medication side effects/concerns: none    Follow up with Mr. Geo Lang completed. No further questions or concerns. Will continue to follow up to achieve health goals.    "

## 2019-08-20 ENCOUNTER — PATIENT MESSAGE (OUTPATIENT)
Dept: INTERNAL MEDICINE | Facility: CLINIC | Age: 54
End: 2019-08-20

## 2019-08-20 DIAGNOSIS — E11.9 TYPE 2 DIABETES MELLITUS WITHOUT COMPLICATION, WITHOUT LONG-TERM CURRENT USE OF INSULIN: Primary | Chronic | ICD-10-CM

## 2019-09-10 DIAGNOSIS — E11.9 TYPE 2 DIABETES MELLITUS WITHOUT COMPLICATION, WITHOUT LONG-TERM CURRENT USE OF INSULIN: ICD-10-CM

## 2019-09-10 RX ORDER — ATORVASTATIN CALCIUM 20 MG/1
TABLET, FILM COATED ORAL
Qty: 30 TABLET | Refills: 35 | Status: SHIPPED | OUTPATIENT
Start: 2019-09-10 | End: 2020-07-15 | Stop reason: SDUPTHER

## 2019-09-12 ENCOUNTER — PATIENT OUTREACH (OUTPATIENT)
Dept: OTHER | Facility: OTHER | Age: 54
End: 2019-09-12

## 2019-09-13 NOTE — PROGRESS NOTES
"Digital Medicine: Health  Follow-Up    Patient reports he is well, no complaints.     The history is provided by the patient.     Follow Up  Follow-up reason(s): reading review      Readings are trending down due to improved technique and not taking reading right after drinking coffee.            Diet:   Patient reports eating or drinking the following: fast food, soda, fruit, water, fresh vegetables, caffeine, artificial sweeteners and fish, berries, frozen veggies, 2 sodas per day, diet teaHe has the following dietary restrictions: low sodium diet and using "lite salt"     The patient states that he typically eats a non-home cooked meal (ex: dining out, take out, frozen meals) 1-2 times per week.      Intervention(s): reading food labels    Medication Adherence:   He misses doses: never      Tobacco and Alcohol:       Patient is not eligible for referral to smoking cessation.        [facility]    INTERVENTION(S)  recommended diet modifications, encouragement/support and goal setting    PLAN  patient verbalizes understanding          Topic    Hemoglobin A1C     Eye Exam        Last 5 Patient Entered Readings                                      Current 30 Day Average: 131/81     Recent Readings 9/8/2019 9/2/2019 8/29/2019 8/20/2019 8/14/2019    SBP (mmHg) 123 134 148 125 125    DBP (mmHg) 76 90 85 82 71    Pulse 80 76 85 92 65                "

## 2019-09-18 ENCOUNTER — OFFICE VISIT (OUTPATIENT)
Dept: PODIATRY | Facility: CLINIC | Age: 54
End: 2019-09-18
Payer: COMMERCIAL

## 2019-09-18 VITALS
SYSTOLIC BLOOD PRESSURE: 127 MMHG | HEIGHT: 71 IN | WEIGHT: 252.88 LBS | DIASTOLIC BLOOD PRESSURE: 84 MMHG | BODY MASS INDEX: 35.4 KG/M2 | HEART RATE: 74 BPM

## 2019-09-18 DIAGNOSIS — E11.9 TYPE 2 DIABETES MELLITUS WITHOUT COMPLICATION, WITHOUT LONG-TERM CURRENT USE OF INSULIN: Primary | ICD-10-CM

## 2019-09-18 DIAGNOSIS — M21.622 TAILOR'S BUNION OF BOTH FEET: ICD-10-CM

## 2019-09-18 DIAGNOSIS — M20.12 VALGUS DEFORMITY OF BOTH GREAT TOES: ICD-10-CM

## 2019-09-18 DIAGNOSIS — L84 CORN OR CALLUS: ICD-10-CM

## 2019-09-18 DIAGNOSIS — M20.11 VALGUS DEFORMITY OF BOTH GREAT TOES: ICD-10-CM

## 2019-09-18 DIAGNOSIS — M21.621 TAILOR'S BUNION OF BOTH FEET: ICD-10-CM

## 2019-09-18 PROCEDURE — 99213 OFFICE O/P EST LOW 20 MIN: CPT | Mod: S$GLB,,, | Performed by: PODIATRIST

## 2019-09-18 PROCEDURE — 3074F PR MOST RECENT SYSTOLIC BLOOD PRESSURE < 130 MM HG: ICD-10-PCS | Mod: CPTII,S$GLB,, | Performed by: PODIATRIST

## 2019-09-18 PROCEDURE — 3008F BODY MASS INDEX DOCD: CPT | Mod: CPTII,S$GLB,, | Performed by: PODIATRIST

## 2019-09-18 PROCEDURE — 99999 PR PBB SHADOW E&M-EST. PATIENT-LVL III: CPT | Mod: PBBFAC,,, | Performed by: PODIATRIST

## 2019-09-18 PROCEDURE — 3044F PR MOST RECENT HEMOGLOBIN A1C LEVEL <7.0%: ICD-10-PCS | Mod: CPTII,S$GLB,, | Performed by: PODIATRIST

## 2019-09-18 PROCEDURE — 3079F PR MOST RECENT DIASTOLIC BLOOD PRESSURE 80-89 MM HG: ICD-10-PCS | Mod: CPTII,S$GLB,, | Performed by: PODIATRIST

## 2019-09-18 PROCEDURE — 3079F DIAST BP 80-89 MM HG: CPT | Mod: CPTII,S$GLB,, | Performed by: PODIATRIST

## 2019-09-18 PROCEDURE — 3008F PR BODY MASS INDEX (BMI) DOCUMENTED: ICD-10-PCS | Mod: CPTII,S$GLB,, | Performed by: PODIATRIST

## 2019-09-18 PROCEDURE — 3044F HG A1C LEVEL LT 7.0%: CPT | Mod: CPTII,S$GLB,, | Performed by: PODIATRIST

## 2019-09-18 PROCEDURE — 99213 PR OFFICE/OUTPT VISIT, EST, LEVL III, 20-29 MIN: ICD-10-PCS | Mod: S$GLB,,, | Performed by: PODIATRIST

## 2019-09-18 PROCEDURE — 99999 PR PBB SHADOW E&M-EST. PATIENT-LVL III: ICD-10-PCS | Mod: PBBFAC,,, | Performed by: PODIATRIST

## 2019-09-18 PROCEDURE — 3074F SYST BP LT 130 MM HG: CPT | Mod: CPTII,S$GLB,, | Performed by: PODIATRIST

## 2019-09-18 NOTE — PROGRESS NOTES
"Subjective:      Patient ID: Geo Lang is a 53 y.o. male.    Chief Complaint: Diabetes Mellitus (6.5 2/15/19 [place] 6/19/19) and Diabetic Foot Exam    Geo is a 53 y.o. male who presents to the clinic for evaluation and treatment of diabetic feet. Geo has a past medical history of Anxiety (10/2/2013), Chronic constipation, Corneal abrasion (20 yrs ago), Diabetes mellitus type I, GERD (gastroesophageal reflux disease), HTN (hypertension) (10/2/2013), Hyperlipidemia, and LPRD (laryngopharyngeal reflux disease) (12/10/2013). Patient relates no major problem with feet.  Notes several "rough" areas of skin.  Denies experiencing pain from these sites.  Was applying [place] diabetic cream, however, has not been as consistent as of late.  Relates continued control over his blood glucose daily with oral medication only.  Denies any additional pedal complaints.    PCP: Pj Velasco MD    Date Last Seen by PCP: 6/19/19    Hemoglobin A1C   Date Value Ref Range Status   02/15/2019 6.5 (H) 4.0 - 5.6 % Final     Comment:     ADA Screening Guidelines:  5.7-6.4%  Consistent with prediabetes  >or=6.5%  Consistent with diabetes  High levels of fetal hemoglobin interfere with the HbA1C  assay. Heterozygous hemoglobin variants (HbS, HgC, etc)do  not significantly interfere with this assay.   However, presence of multiple variants may affect accuracy.     08/07/2018 6.4 (H) 4.0 - 5.6 % Final     Comment:     ADA Screening Guidelines:  5.7-6.4%  Consistent with prediabetes  >or=6.5%  Consistent with diabetes  High levels of fetal hemoglobin interfere with the HbA1C  assay. Heterozygous hemoglobin variants (HbS, HgC, etc)do  not significantly interfere with this assay.   However, presence of multiple variants may affect accuracy.     02/02/2018 6.1 (H) 4.0 - 5.6 % Final     Comment:     According to ADA guidelines, hemoglobin A1c <7.0% represents  optimal control in non-pregnant diabetic patients. Different  metrics may " apply to specific patient populations.   Standards of Medical Care in Diabetes-2016.  For the purpose of screening for the presence of diabetes:  <5.7%     Consistent with the absence of diabetes  5.7-6.4%  Consistent with increasing risk for diabetes   (prediabetes)  >or=6.5%  Consistent with diabetes  Currently, no consensus exists for use of hemoglobin A1c  for diagnosis of diabetes for children.  This Hemoglobin A1c assay has significant interference with fetal   hemoglobin   (HbF). The results are invalid for patients with abnormal amounts of   HbF,   including those with known Hereditary Persistence   of Fetal Hemoglobin. Heterozygous hemoglobin variants (HbAS, HbAC,   HbAD, HbAE, HbA2) do not significantly interfere with this assay;   however, presence of multiple variants in a sample may impact the %   interference.             Past Medical History:   Diagnosis Date    Anxiety 10/2/2013    Chronic constipation     Corneal abrasion 20 yrs ago    ou from cls    Diabetes mellitus type I     GERD (gastroesophageal reflux disease)     HTN (hypertension) 10/2/2013    Hyperlipidemia     LPRD (laryngopharyngeal reflux disease) 12/10/2013       Past Surgical History:   Procedure Laterality Date    COLONOSCOPY N/A 11/7/2015    [facility]    EGD (ESOPHAGOGASTRODUODENOSCOPY) N/A 12/23/2013    [facility] (4TH FLR)    MANOMETRY, ESOPHAGUS N/A 3/5/2014    [facility] (4TH FLR)    nissen         Family History   Problem Relation Age of Onset    Hypertension Father     Hyperlipidemia Father     Heart failure Mother     Diabetes Mother     Hypertension Sister     Diabetes Sister     Hypertension Brother     Diabetes Brother     Hypertension Sister     No Known Problems Maternal Aunt     No Known Problems Maternal Uncle     No Known Problems Paternal Aunt     No Known Problems Paternal  Uncle     No Known Problems Maternal Grandmother     No Known Problems Maternal Grandfather     No Known Problems Paternal Grandmother     No Known Problems Paternal Grandfather     Melanoma Neg Hx     Amblyopia Neg Hx     Blindness Neg Hx     Cancer Neg Hx     Cataracts Neg Hx     Glaucoma Neg Hx     Macular degeneration Neg Hx     Retinal detachment Neg Hx     Strabismus Neg Hx     Stroke Neg Hx     Thyroid disease Neg Hx     Colon cancer Neg Hx     Esophageal cancer Neg Hx        Social History     Socioeconomic History    Marital status: Single     Spouse name: Not on file    Number of children: Not on file    Years of education: Not on file    Highest education level: Not on file   Occupational History    Not on file   Social Needs    Financial resource strain: Not on file    Food insecurity:     Worry: Not on file     Inability: Not on file    Transportation needs:     Medical: Not on file     Non-medical: Not on file   Tobacco Use    Smoking status: Former Smoker     Packs/day: 1.00     Years: 15.00     Pack years: 15.00     Last attempt to quit: 3/15/2013     Years since quittin.5    Smokeless tobacco: Never Used   Substance and Sexual Activity    Alcohol use: No    Drug use: No    Sexual activity: Not on file   Lifestyle    Physical activity:     Days per week: Not on file     Minutes per session: Not on file    Stress: Not on file   Relationships    Social connections:     Talks on phone: Not on file     Gets together: Not on file     Attends [employer] service: Not on file     Active member of club or organization: Not on file     Attends meetings of clubs or organizations: Not on file     Relationship status: Not on file   Other Topics Concern    Not on file   Social History Narrative    Not on file       Current Outpatient Medications   Medication Sig Dispense Refill    chlorthalidone (HYGROTEN) 25 MG Tab TAKE 1/2 (ONE-HALF) TABLET BY MOUTH ONCE DAILY FOR BLOOD  PRESSURE 30 tablet 2    empagliflozin-metformin (SYNJARDY) 5-1,000 mg Tab Take 1 tablet by mouth 2 (two) times daily. 60 tablet 12    esomeprazole (NEXIUM) 40 MG capsule Take 1 capsule (40 mg total) by mouth before breakfast. 30 capsule 11    ondansetron (ZOFRAN) 8 MG tablet TAKE ONE TABLET BY MOUTH EVERY 12 HOURS AS NEEDED FOR NAUSEA 12 tablet 1    valsartan (DIOVAN) 320 MG tablet Take 1 tablet (320 mg total) by mouth once daily. 30 tablet 5    albuterol (PROVENTIL/VENTOLIN HFA) 90 mcg/actuation inhaler Inhale 1-2 puffs into the lungs every 6 (six) hours as needed. Rescue 1 Inhaler 0    ALPRAZolam (XANAX) 1 MG tablet Take 1 tablet (1 mg total) by mouth 2 (two) times daily. 15 tablet 2    atenolol (TENORMIN) 50 MG tablet Take 1.5 tablets (75 mg total) by mouth once daily. 135 tablet 3    atorvastatin (LIPITOR) 20 MG tablet TAKE 1 TABLET BY MOUTH ONCE DAILY 30 tablet 35    azithromycin (Z-CATHY) 250 MG tablet Take 1 tablet (250 mg total) by mouth once daily. Take first 2 tablets together, then 1 every day until finished. 6 tablet 0    blood sugar diagnostic Strp 1 strip by Misc.(Non-Drug; Combo Route) route 2 (two) times daily. New diabetic requiring more frequent testing. 100 strip 6    blood sugar diagnostic Strp Use daily as directed 100 each 4    lancets 33 gauge Misc 1 lancet by Misc.(Non-Drug; Combo Route) route once daily. 50 each 6    metaxalone (SKELAXIN) 800 MG tablet Take 1 tablet (800 mg total) by mouth every evening. 15 tablet 1    mupirocin calcium 2% (BACTROBAN) 2 % cream Apply topically 3 (three) times daily. 15 g 1     No current facility-administered medications for this visit.        Review of patient's allergies indicates:  No Known Allergies      Review of Systems   Constitution: Negative for chills and fever.   Cardiovascular: Negative for claudication and leg swelling.   Skin: Negative for color change and nail changes.   Musculoskeletal: Negative for joint pain, joint swelling,  muscle cramps and muscle weakness.   Neurological: Negative for numbness and paresthesias.   Psychiatric/Behavioral: Negative for altered mental status.           Objective:      Physical Exam   Constitutional: He is oriented to person, place, and time. He appears well-developed and well-nourished. No distress.   Cardiovascular:   Pulses:       Dorsalis pedis pulses are 2+ on the right side, and 2+ on the left side.        Posterior tibial pulses are 2+ on the right side, and 2+ on the left side.   CFT is < 3 seconds bilateral.  Pedal hair growth is present bilateral.  No varicosities noted bilateral.  No lower extremity edema noted bilateral.  Toes are warm to touch bilateral.     Musculoskeletal: He exhibits no edema or tenderness.   Muscle strength 5/5 in all muscle groups bilateral.  No tenderness nor crepitation with ROM of foot/ankle joints bilateral.  No tenderness with palpation of bilateral foot and ankle.  Bilateral pes planus foot type.  Bilateral hallux abducto valgus.  Bilateral Tailors bunion.  Rectus alignment of remaining lesser digits bilateral.   Neurological: He is alert and oriented to person, place, and time. He has normal strength. No sensory deficit.   Protective sensation per [place] monofilament is intact bilateral.  Vibratory sensation is intact bilateral.  Light touch is intact bilateral.   Skin: Skin is warm, dry and intact. Capillary refill takes less than 2 seconds. Lesion noted. No abrasion, no bruising, no burn, no ecchymosis, no laceration and no petechiae noted. He is not diaphoretic.   Pedal skin has normal turgor, temperature, and texture bilateral.  Toenails x 10 appear normotrophic.  Light hyperkeratotic lesions noted to bilateral medial hallux, 1st mtp joint, and the Rt. Sub 5th met head.  Examination of the skin reveals no evidence of significant maceration, rashes, open lesions, suspicious appearing nevi or other concerning lesions.              Assessment:        Encounter Diagnoses   Name Primary?    Type 2 diabetes mellitus without complication, without long-term current use of insulin Yes    Valgus deformity of both great toes     Tailor's bunion of both feet     Corn or callus          Plan:       Geo was seen today for diabetes mellitus and diabetic foot exam.    Diagnoses and all orders for this visit:    Type 2 diabetes mellitus without complication, without long-term current use of insulin    Valgus deformity of both great toes    Tailor's bunion of both feet    Corn or callus      I counseled the patient on his conditions, their implications and medical management.    Advised to begin applying Amlactin daily to calluses to minimize build up.    Advised to continue wearing shoe gear that accommodates digital deformities.    Shoe inspection. Diabetic Foot Education. Patient reminded of the importance of good nutrition and blood sugar control to help prevent podiatric complications of diabetes. Patient instructed on proper foot hygeine. We discussed wearing proper shoe gear, daily foot inspections, never walking without protective shoe gear, never putting sharp instruments to feet    Patient instructed to inspect his feet, wear protective shoe gear when ambulatory, moisturizer to maintain skin integrity and follow in this office in approximately 12 months, sooner p.r.n.    Follow up in about 1 year (around 9/18/2020).    Ha Tobias DPM

## 2019-09-20 ENCOUNTER — LAB VISIT (OUTPATIENT)
Dept: LAB | Facility: HOSPITAL | Age: 54
End: 2019-09-20
Attending: INTERNAL MEDICINE
Payer: COMMERCIAL

## 2019-09-20 ENCOUNTER — PATIENT OUTREACH (OUTPATIENT)
Dept: OTHER | Facility: OTHER | Age: 54
End: 2019-09-20

## 2019-09-20 DIAGNOSIS — E11.9 TYPE 2 DIABETES MELLITUS WITHOUT COMPLICATION, WITHOUT LONG-TERM CURRENT USE OF INSULIN: Chronic | ICD-10-CM

## 2019-09-20 LAB
ESTIMATED AVG GLUCOSE: 157 MG/DL (ref 68–131)
HBA1C MFR BLD HPLC: 7.1 % (ref 4–5.6)

## 2019-09-20 PROCEDURE — 83036 HEMOGLOBIN GLYCOSYLATED A1C: CPT

## 2019-09-20 PROCEDURE — 36415 COLL VENOUS BLD VENIPUNCTURE: CPT

## 2019-09-20 NOTE — PROGRESS NOTES
Digital Medicine: Clinician Follow-Up    Called patient for BP follow up. He is doing well today with no complaints. He is pleased with downward trend in BP. He says he hasn't made significant lifestyle changes but work has has been a little less stressful.         Follow Up  Follow-up reason(s): reading review and routine education      Readings are trending down due to lifestyle change.            Sleep Apnea  Patient previously diagnosed with CALVIN     He reports he is currently using CPAP     Medication Affordability  Patient is currently not having problems affording medications          30-day BP average exceeds goal of <130/<80 mmHg, but trending down. BP in clinic on 9/18/19 was 127/84 mmHg. Continue current medications.   Continue lifestyle changes.           Topic    Hemoglobin A1C     Eye Exam        Last 5 Patient Entered Readings                                      Current 30 Day Average: 133/83     Recent Readings 9/17/2019 9/8/2019 9/2/2019 8/29/2019 8/20/2019    SBP (mmHg) 128 123 134 148 125    DBP (mmHg) 79 76 90 85 82    Pulse 76 80 76 85 92             Hypertension Medications             atenolol (TENORMIN) 50 MG tablet Take 1.5 tablets (75 mg total) by mouth once daily.    chlorthalidone (HYGROTEN) 25 MG Tab TAKE 1/2 (ONE-HALF) TABLET BY MOUTH ONCE DAILY FOR BLOOD PRESSURE    valsartan (DIOVAN) 320 MG tablet Take 1 tablet (320 mg total) by mouth once daily.

## 2019-09-25 ENCOUNTER — PATIENT OUTREACH (OUTPATIENT)
Dept: ADMINISTRATIVE | Facility: OTHER | Age: 54
End: 2019-09-25

## 2019-09-26 ENCOUNTER — OFFICE VISIT (OUTPATIENT)
Dept: OPTOMETRY | Facility: CLINIC | Age: 54
End: 2019-09-26
Payer: COMMERCIAL

## 2019-09-26 DIAGNOSIS — H40.013 OAG (OPEN ANGLE GLAUCOMA) SUSPECT, LOW RISK, BILATERAL: ICD-10-CM

## 2019-09-26 DIAGNOSIS — E11.9 DIABETES MELLITUS TYPE 2 WITHOUT RETINOPATHY: Primary | ICD-10-CM

## 2019-09-26 PROCEDURE — 99999 PR PBB SHADOW E&M-EST. PATIENT-LVL III: ICD-10-PCS | Mod: PBBFAC,,, | Performed by: OPTOMETRIST

## 2019-09-26 PROCEDURE — 92133 OCT, OPTIC NERVE - OU - BOTH EYES: ICD-10-PCS | Mod: S$GLB,,, | Performed by: OPTOMETRIST

## 2019-09-26 PROCEDURE — 92133 CPTRZD OPH DX IMG PST SGM ON: CPT | Mod: S$GLB,,, | Performed by: OPTOMETRIST

## 2019-09-26 PROCEDURE — 92014 PR EYE EXAM, EST PATIENT,COMPREHESV: ICD-10-PCS | Mod: S$GLB,,, | Performed by: OPTOMETRIST

## 2019-09-26 PROCEDURE — 99999 PR PBB SHADOW E&M-EST. PATIENT-LVL III: CPT | Mod: PBBFAC,,, | Performed by: OPTOMETRIST

## 2019-09-26 PROCEDURE — 92014 COMPRE OPH EXAM EST PT 1/>: CPT | Mod: S$GLB,,, | Performed by: OPTOMETRIST

## 2019-09-26 NOTE — PROGRESS NOTES
HPI     Patient is here for annaul diabetic eye exam.  Hemoglobin A1C       Date                     Value               Ref Range             Status                09/20/2019               7.1 (H)             4.0 - 5.6 %           Final                 02/15/2019               6.5 (H)             4.0 - 5.6 %           Final                 08/07/2018               6.4 (H)             4.0 - 5.6 %           Final          \    Last edited by Candido Lim, PCT on 9/26/2019  7:50 AM. (History)            Assessment /Plan     For exam results, see Encounter Report.    Diabetes mellitus type 2 without retinopathy  -No retinopathy noted today.  Continued control with primary care physician and annual comprehensive eye exam.    OAG (open angle glaucoma) suspect, low risk, bilateral  -     OCT, Optic Nerve - OU - Both Eyes  -OCT stable over 2 yrs  -FHx  -Enlarged CDs  -monitor as low risk    RTC 1 yr

## 2019-10-03 ENCOUNTER — PATIENT MESSAGE (OUTPATIENT)
Dept: INTERNAL MEDICINE | Facility: CLINIC | Age: 54
End: 2019-10-03

## 2019-10-03 DIAGNOSIS — G89.29 CHRONIC NONINTRACTABLE HEADACHE, UNSPECIFIED HEADACHE TYPE: Primary | ICD-10-CM

## 2019-10-03 DIAGNOSIS — R51.9 CHRONIC NONINTRACTABLE HEADACHE, UNSPECIFIED HEADACHE TYPE: Primary | ICD-10-CM

## 2019-10-04 ENCOUNTER — NURSE TRIAGE (OUTPATIENT)
Dept: ADMINISTRATIVE | Facility: CLINIC | Age: 54
End: 2019-10-04

## 2019-10-04 NOTE — TELEPHONE ENCOUNTER
"  Pt calling in to "see if anyone was going to call me back to be seen today." He saw pcp in June for headaches. He again spoke with pcp yesterday d\t continuation of headaches. Not able to see neuro until 12/2/2019 and feels that he needs to be seen sooner. He states that he is on waiting list for sooner appointment. Suggested he call [facility] neuro to see if sooner appt. I had to follow disposition question of > 4 weeks, but wanted to leave it up to Dr. Velasco to see if he needs to see pt since he spoke with him yesterday. Pt encouraged to call [facility] immediately if worse headaches, n\v with headaches, vision\speech changes or weakness\numbness to one side of body\face. He did not check bp or BS today. Yesterday bp was 139/84 and . Message to pcp.   Reason for Disposition   Headache is a chronic symptom (recurrent or ongoing AND present > 4 weeks)    Additional Information   Negative: Difficult to awaken or acting confused (e.g., disoriented, slurred speech)   Negative: [1] Weakness of the face, arm or leg on one side of the body AND [2] new onset   Negative: [1] Numbness of the face, arm or leg on one side of the body AND [2] new onset   Negative: [1] Loss of speech or garbled speech AND [2] new onset   Negative: Passed out (i.e., lost consciousness, collapsed and was not responding)   Negative: Sounds like a life-threatening emergency to the triager   Negative: Followed a head injury within last 3 days   Negative: Pregnant   Negative: Traumatic Brain Injury (TBI) is suspected   Negative: Unable to walk, or can only walk with assistance (e.g., requires support)   Negative: Stiff neck (can't touch chin to chest)   Negative: Severe pain in one eye   Negative: [1] Other family members (or roommates) with headaches AND [2] possibility of carbon monoxide exposure   Negative: [1] SEVERE headache (e.g., excruciating) AND [2] "worst headache" of life   Negative: [1] SEVERE headache AND [2] sudden-onset " (i.e., reaching maximum intensity within seconds)   Negative: [1] SEVERE headache AND [2] fever   Negative: Loss of vision or double vision (Exception: same as prior migraines)   Negative: [1] Fever > 100.0 F (37.8 C) AND [2] diabetes mellitus or weak immune system (e.g., HIV positive, cancer chemo, splenectomy, chronic steroids)   Negative: Patient sounds very sick or weak to the triager   Negative: [1] SEVERE headache (e.g., excruciating) AND [2] not improved after 2 hours of pain medicine   Negative: [1] Vomiting AND [2] 2 or more times (Exception: similar to previous migraines)   Negative: Fever > 104 F (40 C)   Negative: [1] MODERATE headache (e.g., interferes with normal activities) AND [2] present > 24 hours AND [3] unexplained  (Exceptions: analgesics not tried, typical migraine, or headache part of viral illness)   Negative: [1] New headache AND [2] weak immune system (e.g., HIV positive, cancer chemo, splenectomy, organ transplant, chronic steroids)   Negative: [1] New headache AND [2] age > 50   Negative: [1] Sinus pain of forehead AND [2] yellow or green nasal discharge   Negative: Fever present > 3 days (72 hours)   Negative: [1] MILD-MODERATE headache AND [2] present > 72 hours   Negative: Headache started during sex    Protocols used: HEADACHE-A-AH

## 2019-10-05 ENCOUNTER — TELEPHONE (OUTPATIENT)
Dept: INTERNAL MEDICINE | Facility: CLINIC | Age: 54
End: 2019-10-05

## 2019-10-05 NOTE — TELEPHONE ENCOUNTER
Pt is calling he would like PCP to contact Neurology to get a sooner appointment. Pt called [facility] on call on yesterday because he didn't receive a call back from Neurology on yesterday and was advised to call office back, however they were closed. Spoke with pt, advised him to get on Waiting List which he is on however pt doesn't want to wait until December to have this addressed. Please advise.

## 2019-10-05 NOTE — TELEPHONE ENCOUNTER
Pt is concerned because he can't get in to see Neurology. Pt states that he doesn't want to wait 2 months for an appointment. Pt would like PCP to get a sooner appointment.

## 2019-10-06 NOTE — TELEPHONE ENCOUNTER
Certainly we can check if Neurology has something sooner but that may be the first new pt consult. Does pt need an urgent care visit with us in Internal Medicine in the meantime? Also if he has an outside doctor he would like to see we can provide an external referral.

## 2019-10-07 NOTE — TELEPHONE ENCOUNTER
Pt states as of right now he does not want to be seen in internal medicine. He is looking into finding an outside neurologist. He will call to let us know if he finds a doctor. He will also call id he changes his mind about being seen in internal medicine

## 2019-10-08 ENCOUNTER — TELEPHONE (OUTPATIENT)
Dept: INTERNAL MEDICINE | Facility: CLINIC | Age: 54
End: 2019-10-08

## 2019-10-08 ENCOUNTER — PATIENT MESSAGE (OUTPATIENT)
Dept: INTERNAL MEDICINE | Facility: CLINIC | Age: 54
End: 2019-10-08

## 2019-10-08 NOTE — TELEPHONE ENCOUNTER
Pt is requesting to see a neurologist at [facility]. He wants to know if you are familiar with any of the neurologist at [facility]

## 2019-10-11 ENCOUNTER — PATIENT OUTREACH (OUTPATIENT)
Dept: OTHER | Facility: OTHER | Age: 54
End: 2019-10-11

## 2019-10-11 NOTE — PROGRESS NOTES
"Digital Medicine: Health  Follow-Up    Discussed upward trend in BP readings. Patient denies symptoms of hypertension, reports he is unsure of reason for upward trend.         The history is provided by the patient.   Hypertension   This is a chronic problem.           Diet:   Patient reports eating or drinking the following: water, fresh vegetables and restaurant food    The patient drinks 50 ounces of water per day.    He has the following dietary restrictions: low sodium diet and low sugar    He eats 3 meals and 0 snacks per day.      The patient states that he typically eats a non-home cooked meal (ex: dining out, take out, frozen meals) 1-2 times per week.      Eating style: time constraints    Reports he is monitoring sodium intake "more than ever before".   Limiting to 500 mg sodium per meal, reading food labels.  Limiting snacking between meals. Eating more vegetables at dinner.     Patient considering increasing water intake, busy work schedule presents challenges. Will discuss further next call.    Intervention(s): reading food labels    Physical Activity:       Discuss further next call     Medication Adherence:     deferred      SDOH    INTERVENTION(S)  recommended diet modifications and encouragement/support    PLAN  patient verbalizes understanding    Most readings are taken in the evening after work. Patient verbalized intent to submit a BP reading this weekend, when he is "totally relaxed", to compare readings. WCB in 2 weeks to discuss.       There are no preventive care reminders to display for this patient.    Last 5 Patient Entered Readings                                      Current 30 Day Average: 139/85     Recent Readings 10/8/2019 10/2/2019 9/30/2019 9/30/2019 9/27/2019    SBP (mmHg) 150 139 138 147 145    DBP (mmHg) 92 82 79 84 90    Pulse 65 71 66 67 71                "

## 2019-10-11 NOTE — PROGRESS NOTES
Patient called back to further discuss lifestyle modifications.     Reports he is experiencing chronic headaches, has an upcoming appointment with neurologist. Will inform DM clinician.     Discussed water intake. Set goal to increase intake. Verbalized intent to purchase 2 cases of water this week- 1 for his office, and 1 for his home. Verbalized intent to keep bottle caps to count at the end of each day.     Discussed stress management. Discussed ideas to limit and manage daily stress. Patient enjoys walking outside and shopping.     WCB in 2 weeks to discuss further.

## 2019-10-21 ENCOUNTER — PATIENT MESSAGE (OUTPATIENT)
Dept: INTERNAL MEDICINE | Facility: CLINIC | Age: 54
End: 2019-10-21

## 2019-10-21 ENCOUNTER — TELEPHONE (OUTPATIENT)
Dept: INTERNAL MEDICINE | Facility: CLINIC | Age: 54
End: 2019-10-21

## 2019-10-23 ENCOUNTER — TELEPHONE (OUTPATIENT)
Dept: INTERNAL MEDICINE | Facility: CLINIC | Age: 54
End: 2019-10-23

## 2019-10-23 NOTE — TELEPHONE ENCOUNTER
Normal CTA of the brain was received from [facility]. Done by Neurology for Headaches. Will scan to [facility].

## 2019-10-25 ENCOUNTER — PATIENT OUTREACH (OUTPATIENT)
Dept: OTHER | Facility: OTHER | Age: 54
End: 2019-10-25

## 2019-10-25 NOTE — PROGRESS NOTES
Digital Medicine: Health  Follow-Up    Called to assess goal set last call. Patient reports he is well, no complaints. Currently in [place], but will be back Tuesday and will resume monitoring.      Reports he traded in [facility] cuff [facility] for a larger cuff.      Reports drinking 7-8 bottles of water per day, vegetables at every meal.     The history is provided by the patient.     Follow Up  Follow-up reason(s): reading review, routine education and goal follow-up      Readings are trending up due to work-related stress.            Diet:   Patient reports eating or drinking the following: water and fresh vegetables    The patient drinks 100 ounces of water per day.    He has the following dietary restrictions: low sodium diet    Reports drinking 7-8 bottles of water per day, writing the number on each bottle cap and keeping on his desk at work.     Reports efforts to eat vegetables at every meal.     Intervention(s): increasing water intake      SDOH    INTERVENTION(S)  recommended diet modifications, encouragement/support and goal setting    PLAN  patient verbalizes understanding, demonstrates understanding via teach back and patient amenable to changes    Patient successfully completed health goal set last call. Will assess again in 2 weeks.       There are no preventive care reminders to display for this patient.    Last 5 Patient Entered Readings                                      Current 30 Day Average: 143/86     Recent Readings 10/20/2019 10/15/2019 10/8/2019 10/2/2019 9/30/2019    SBP (mmHg) 147 148 150 139 138    DBP (mmHg) 86 85 92 82 79    Pulse 78 70 65 71 66

## 2019-10-30 RX ORDER — ESOMEPRAZOLE MAGNESIUM 40 MG/1
CAPSULE, DELAYED RELEASE ORAL
Qty: 30 CAPSULE | Refills: 11 | Status: SHIPPED | OUTPATIENT
Start: 2019-10-30 | End: 2020-08-13 | Stop reason: SDUPTHER

## 2019-11-08 ENCOUNTER — PATIENT OUTREACH (OUTPATIENT)
Dept: OTHER | Facility: OTHER | Age: 54
End: 2019-11-08

## 2019-11-08 NOTE — PROGRESS NOTES
"Digital Medicine: Health  Follow-Up    Patient reports he is well, no complaints.     Denied symptoms of hypertension (HA, SOB, chest pains).     The history is provided by the patient.     Follow Up  Follow-up reason(s): reading review and routine education      Readings are missing.   patient reminder needed and was recently [place] a trip to [place].      INTERVENTION(S)  recommended diet modifications, recommend physical activity, encouragement/support, denied further coaching, denied resources and denied questions    PLAN  patient verbalizes understanding, demonstrates understanding via teach back, patient amenable to changes and continue monitoring      There are no preventive care reminders to display for this patient.    Last 5 Patient Entered Readings                                      Current 30 Day Average: 146/85     Recent Readings 11/4/2019 10/20/2019 10/15/2019 10/8/2019 10/2/2019    SBP (mmHg) 142 147 148 150 139    DBP (mmHg) 83 86 85 92 82    Pulse 62 78 70 65 71                      Diet Screening   Patient reports eating or drinking the following: water, fresh vegetables, restaurant food and broccoli, frozen veggies, stuffed bellpeppers     The patient drinks 100 ounces of water per day.    He has the following dietary restrictions: low sodium diet and low carb    Patient reports he has maintained water goal, drinking 5-8 bottles of water per day. Reports recent increase in vegetable intake, steaming broccoli.     Seasoning meals with garlic powder/onion powder and butter and lite salt.     Intervention(s): increasing water intake    Physical Activity Screening   When asked if exercising, patient responded: no    Reports he has not been as active since he has returned from [place]. Reports he occasionally walks at the mall, but not on consistent basis.     Reports at one point, he was going to the gym 5 days per week, but "got off track".     Medication Adherence Screening   He misses doses: " never

## 2019-11-25 ENCOUNTER — PATIENT MESSAGE (OUTPATIENT)
Dept: OPTOMETRY | Facility: CLINIC | Age: 54
End: 2019-11-25

## 2019-11-25 ENCOUNTER — PATIENT OUTREACH (OUTPATIENT)
Dept: ADMINISTRATIVE | Facility: OTHER | Age: 54
End: 2019-11-25

## 2019-11-27 ENCOUNTER — OFFICE VISIT (OUTPATIENT)
Dept: OPTOMETRY | Facility: CLINIC | Age: 54
End: 2019-11-27
Payer: COMMERCIAL

## 2019-11-27 DIAGNOSIS — B30.9 ACUTE VIRAL CONJUNCTIVITIS OF RIGHT EYE: Primary | ICD-10-CM

## 2019-11-27 PROCEDURE — 92014 PR EYE EXAM, EST PATIENT,COMPREHESV: ICD-10-PCS | Mod: S$GLB,,, | Performed by: OPTOMETRIST

## 2019-11-27 PROCEDURE — 99999 PR PBB SHADOW E&M-EST. PATIENT-LVL II: ICD-10-PCS | Mod: PBBFAC,,, | Performed by: OPTOMETRIST

## 2019-11-27 PROCEDURE — 92014 COMPRE OPH EXAM EST PT 1/>: CPT | Mod: S$GLB,,, | Performed by: OPTOMETRIST

## 2019-11-27 PROCEDURE — 99999 PR PBB SHADOW E&M-EST. PATIENT-LVL II: CPT | Mod: PBBFAC,,, | Performed by: OPTOMETRIST

## 2019-11-27 RX ORDER — TOBRAMYCIN AND DEXAMETHASONE 3; 1 MG/ML; MG/ML
1-2 SUSPENSION/ DROPS OPHTHALMIC
Qty: 5 ML | Refills: 0 | Status: SHIPPED | OUTPATIENT
Start: 2019-11-27 | End: 2019-12-07

## 2019-11-27 NOTE — PROGRESS NOTES
HPI     HPI:  Patient reports for a couple of weeks he has been having some irritation   in the right eye that has not improved any. C/o Redness and some mild   itching and discomfort, reports that he woke up this morning around 1:00   with a dryness sensation and some blurred vision. Used systane seem to   help with the discomfrt but the vision gradually got better.     Last wore CLs 1 wk. Very rare use      ALEX: 09/26/2019  Glasses? yes  Contacts? no  H/o eye surgery, injections or laser: no  H/o eye injury: no  Known eye conditions? no  Family h/o eye conditions? no  Eye gtts? Systane PRN     (-) Flashes (-)  Floaters (-) Mucous   (-)  Tearing (+) Itching (+) Burning   (-) Headaches (+) Eye Pain/discomfort (+) Irritation   (+)  Redness (-) Double vision (+) Blurry vision    Diabetic?Yes   A1c? Hemoglobin A1C       Date                     Value               Ref Range             Status                09/20/2019               7.1 (H)             4.0 - 5.6 %           Final            02/15/2019               6.5 (H)             4.0 - 5.6 %           Final               08/07/2018               6.4 (H)             4.0 - 5.6 %           Final                  Last edited by Hao Austin, OD on 11/27/2019 10:03 AM. (History)            Assessment /Plan     For exam results, see Encounter Report.    Acute viral conjunctivitis of right eye  -     tobramycin-dexamethasone 0.3-0.1% (TOBRADEX) 0.3-0.1 % DrpS; Place 1-2 drops into the right eye every 4 (four) hours while awake. for 10 days  Dispense: 5 mL; Refill: 0  -Tobradex QID OD  -reviewed hygiene      RTC annual

## 2019-12-12 NOTE — PROGRESS NOTES
Infrequent BP readings  Health  outreach planned ~ 12/13/19  BMP 6/2019 WNL    Hypertension Medications             atenolol (TENORMIN) 50 MG tablet Take 1.5 tablets (75 mg total) by mouth once daily.    chlorthalidone (HYGROTEN) 25 MG Tab TAKE 1/2 (ONE-HALF) TABLET BY MOUTH ONCE DAILY FOR BLOOD PRESSURE    valsartan (DIOVAN) 320 MG tablet Take 1 tablet (320 mg total) by mouth once daily.

## 2019-12-13 ENCOUNTER — PATIENT OUTREACH (OUTPATIENT)
Dept: OTHER | Facility: OTHER | Age: 54
End: 2019-12-13

## 2020-01-05 DIAGNOSIS — I10 ESSENTIAL HYPERTENSION: Chronic | ICD-10-CM

## 2020-01-05 RX ORDER — VALSARTAN 320 MG/1
TABLET ORAL
Qty: 90 TABLET | Refills: 0 | Status: SHIPPED | OUTPATIENT
Start: 2020-01-05 | End: 2020-04-03

## 2020-01-05 RX ORDER — ATENOLOL 50 MG/1
TABLET ORAL
Qty: 90 TABLET | Refills: 0 | Status: SHIPPED | OUTPATIENT
Start: 2020-01-05 | End: 2020-03-06

## 2020-01-05 RX ORDER — CHLORTHALIDONE 25 MG/1
TABLET ORAL
Qty: 90 TABLET | Refills: 0 | Status: SHIPPED | OUTPATIENT
Start: 2020-01-05 | End: 2020-04-03 | Stop reason: SDUPTHER

## 2020-01-21 NOTE — PROGRESS NOTES
Digital Medicine: Health  Follow-Up    Patient reports he is well, no complaints. Denies symptoms of hypertension, including HA, chest pains, SOB and changes in vision. Stated no major changes to lifestyle since last call.    Reports he recently started taking Vitamin C and Alpha Lipoic Acid, B complex and magnesium. Reports he feels he has more energy now.      The history is provided by the patient.     Follow Up  Follow-up reason(s): reading review and routine education      Readings are missing.   patient reminder needed and verbalized plans to resume monitoring this evening .      INTERVENTION(S)  recommended diet modifications, recommend physical activity, encouragement/support, denied further coaching, denied resources and denied questions    PLAN  patient verbalizes understanding, demonstrates understanding via teach back, patient amenable to changes and continue monitoring      There are no preventive care reminders to display for this patient.    Last 5 Patient Entered Readings                                      Current 30 Day Average:      Recent Readings 11/4/2019 10/20/2019 10/15/2019 10/8/2019 10/2/2019    SBP (mmHg) 142 147 148 150 139    DBP (mmHg) 83 86 85 92 82    Pulse 62 78 70 65 71                      Diet Screening   Patient reports eating or drinking the following: water and cabbage- smothered     The patient drinks 68 ounces of water per day.    He has the following dietary restrictions: low sodium diet    Endorsed continue efforts to limit sodium intake.    Physical Activity Screening   When asked if exercising, patient responded: no    Reports efforts to increase this. Planning to start walking more during the day. Ended gym membership.    Medication Adherence Screening   He misses doses: never        SDOH

## 2020-03-03 ENCOUNTER — PATIENT OUTREACH (OUTPATIENT)
Dept: OTHER | Facility: OTHER | Age: 55
End: 2020-03-03

## 2020-03-10 NOTE — PROGRESS NOTES
Digital Medicine: Health  Follow-Up    Patient reports he is well, no complaints. Denies symptoms of hypertension.       The history is provided by the patient.     Follow Up  Follow-up reason(s): reading review and routine education      Readings are missing.   patient reminder needed.      INTERVENTION(S)  recommended diet modifications, encouragement/support, denied further coaching, denied resources, denied questions and low sodium food options     PLAN  patient verbalizes understanding, demonstrates understanding via teach back, patient amenable to changes and continue monitoring          Topic    Lipid (Cholesterol) Test        Last 5 Patient Entered Readings                                      Current 30 Day Average: 151/88     Recent Readings 3/3/2020 11/4/2019 10/20/2019 10/15/2019 10/8/2019    SBP (mmHg) 151 142 147 148 150    DBP (mmHg) 88 83 86 85 92    Pulse 78 62 78 70 65                      Diet Screening   Dinner is typically between. Smothered chicken, spaghetti sauce.    Patient reports eating or drinking the following: fast food, juice, soda, water, caffeine, artificial sweeteners, restaurant food and diet juice, Splenda (10-15 packets per day), cashew milk     The patient drinks 64 ounces of water per day.        The patient states that he typically eats a non-home cooked meal (ex: dining out, take out, frozen meals) 5 or more times per week.  He cooks for self.    Patient does the shopping for groceries.  He gets groceries from the grocery store.      Eating style: loves food    Reports he is making efforts to cook more at home, but challenges to limit sodium intake. Reports he has already tried many salt substitutes, but doesn't like any of them.     Discussed artificial sweeteners, patient plans to reduce intake.     Patient verbalized  Intent to try lemon juice on fish when cooking tonight.    Medication Adherence Screening   He did not miss a dose this month.    Patient reports he  "pays $20 each much for valsartan. Reports he doesn't see "any difference" from losartan. Reports he is aware that his food choices recently are likely contributing to elevations. Verbalized intent to call DM clinician if/when he would like to discuss switching.      SDOH  "

## 2020-03-27 ENCOUNTER — PATIENT OUTREACH (OUTPATIENT)
Dept: OTHER | Facility: OTHER | Age: 55
End: 2020-03-27

## 2020-03-27 NOTE — PROGRESS NOTES
Digital Medicine: Clinician Follow-Up    Called patient for BP follow up. Other than concerns about covid19, patient is doing well without complaints. Patient admits that he has not been taking BP readings and he intends to.     The history is provided by the patient.     Follow Up  Follow-up reason(s): reading review and routine education      Readings are missing.   patient reminder needed.  Routine Education Topics: eating patterns and physical activity  Patient's 30-day BP average is above goal of <130/<80 mmHg.     Infrequent BP readings      INTERVENTION(S)  reviewed appropriate dose schedule, recommended diet modifications and recommended physical activity    PLAN  patient verbalizes understanding and continue monitoring    Continue current medications.     Increase BP monitoring to 1-2 x weekly          Topic    Lipid (Cholesterol) Test     Hemoglobin A1C        Last 5 Patient Entered Readings                                      Current 30 Day Average: 145/88     Recent Readings 3/14/2020 3/11/2020 3/3/2020 11/4/2019 10/20/2019    SBP (mmHg) 144 139 151 142 147    DBP (mmHg) 87 89 88 83 86    Pulse 69 80 78 62 78        Hypertension Medications     atenoloL (TENORMIN) 50 MG tablet TAKE 1 & 1/2 (ONE & ONE-HALF) TABLETS BY MOUTH ONCE DAILY    chlorthalidone (HYGROTEN) 25 MG Tab TAKE 1/2 (ONE-HALF) TABLET BY MOUTH ONCE DAILY FOR BLOOD PRESSURE    valsartan (DIOVAN) 320 MG tablet TAKE 1 TABLET BY MOUTH ONCE DAILY                 Screenings

## 2020-04-03 DIAGNOSIS — I10 ESSENTIAL HYPERTENSION: Chronic | ICD-10-CM

## 2020-04-03 RX ORDER — VALSARTAN 320 MG/1
TABLET ORAL
Qty: 90 TABLET | Refills: 0 | Status: SHIPPED | OUTPATIENT
Start: 2020-04-03 | End: 2020-07-06 | Stop reason: SDUPTHER

## 2020-04-03 RX ORDER — CHLORTHALIDONE 25 MG/1
12.5 TABLET ORAL DAILY
Qty: 90 TABLET | Refills: 0 | Status: SHIPPED | OUTPATIENT
Start: 2020-04-03 | End: 2021-01-05

## 2020-04-07 ENCOUNTER — PATIENT OUTREACH (OUTPATIENT)
Dept: OTHER | Facility: OTHER | Age: 55
End: 2020-04-07

## 2020-04-07 NOTE — PROGRESS NOTES
Digital Medicine: Health  Follow-Up    Patient reports he is well, no complaints. Denies symptoms of hypertension- HA, chest pains and SOB.     Discussed COVID-19 and importance of proper hand hygiene and social distancing.       The history is provided by the patient.     Follow Up  Follow-up reason(s): reading review and routine education      Readings are missing.   patient reminder needed.      INTERVENTION(S)  recommended diet modifications, recommend physical activity, encouragement/support, denied further coaching, denied resources and denied questions    PLAN  patient verbalizes understanding, demonstrates understanding via teach back, patient amenable to changes and continue monitoring          Topic    Lipid (Cholesterol) Test     Hemoglobin A1C        Last 5 Patient Entered Readings                                      Current 30 Day Average: 139/87     Recent Readings 3/29/2020 3/14/2020 3/11/2020 3/3/2020 11/4/2019    SBP (mmHg) 135 144 139 151 142    DBP (mmHg) 84 87 89 88 83    Pulse 75 69 80 78 62                      Diet Screening   Patient reports eating or drinking the following: water and frozen mealsHe has the following dietary restrictions: low sodium dietHe cooks for self.    Patient does the shopping for groceries.  He gets groceries from the grocery store.      Reports he has been eating the same types of food. Unsure of cause of elevated BP readings. Reports he was able to purchase a few of the low-sodium frozen meals.     Physical Activity Screening   When asked if exercising, patient responded: yes    Patient participates in the following activities: walking and yard/housework    Endorsed efforts to stay active during this time. Concerned about safety about walking outside during this time.     Medication Adherence Screening   He did not miss a dose this month.      SDOH

## 2020-04-24 ENCOUNTER — PATIENT OUTREACH (OUTPATIENT)
Dept: OTHER | Facility: OTHER | Age: 55
End: 2020-04-24

## 2020-05-05 RX ORDER — ATENOLOL 50 MG/1
TABLET ORAL
Qty: 90 TABLET | Refills: 0 | Status: SHIPPED | OUTPATIENT
Start: 2020-05-05 | End: 2020-07-06 | Stop reason: SDUPTHER

## 2020-05-12 ENCOUNTER — PATIENT OUTREACH (OUTPATIENT)
Dept: OTHER | Facility: OTHER | Age: 55
End: 2020-05-12

## 2020-05-12 NOTE — PROGRESS NOTES
"Digital Medicine: Health  Follow-Up    Patient reports he is well, no complaints. Denies symptoms of hypertension- HA, chest pains and SOB.     Discussed COVID-19 and importance of proper hand hygiene and social distancing. Reports the news has been "all-consuming".     Still comparing BP readings from other BP device. Reports readings are consistently lower on other device compared to [employer] cuff. Planning to take device to the [facility] when it reopens.    The history is provided by the patient.     Follow Up  Follow-up reason(s): reading review and routine education      Readings are trending up due to inaccurate technique and possibly inaccurate technique, comparing readings to other device.        INTERVENTION(S)  recommended diet modifications, recommend physical activity, encouragement/support, denied further coaching, denied resources and denied questions    PLAN  patient verbalizes understanding, demonstrates understanding via teach back, patient amenable to changes and continue monitoring          Topic    Lipid (Cholesterol) Test     Hemoglobin A1C        Last 5 Patient Entered Readings                                      Current 30 Day Average: 142/83     Recent Readings 5/10/2020 5/10/2020 5/8/2020 4/26/2020 4/24/2020    SBP (mmHg) 137 147 142 144 138    DBP (mmHg) 80 88 80 86 78    Pulse 70 70 68 73 69                      Diet Screening   Snacks are typically. Oranges .    Patient reports eating or drinking the following: fruit, water, frozen meals and spaghetti and meatballs He cooks for self and has meals prepared by family.    Patient does the shopping for groceries and lets family grocery shop.  He gets groceries from the grocery store.      Reports eating habits have been "relatively well". Reports he has been cooking, not eating any fast food.    Reports he purchased a couple of low-salt frozen meals I suggested last call.     Reports he has been drinking 4-5 bottles of water per day. "         Physical Activity Screening   When asked if exercising, patient responded: yes    Patient participates in the following activities: yard/housework and cuts the grass, walking in the grocery store    Not really interested in increasing exercising during this time.     Medication Adherence Screening   He did not miss a dose this month.      SDOH

## 2020-06-17 ENCOUNTER — PATIENT MESSAGE (OUTPATIENT)
Dept: INTERNAL MEDICINE | Facility: CLINIC | Age: 55
End: 2020-06-17

## 2020-06-17 DIAGNOSIS — Z20.822 EXPOSURE TO COVID-19 VIRUS: Primary | ICD-10-CM

## 2020-06-17 DIAGNOSIS — E11.9 TYPE 2 DIABETES MELLITUS WITHOUT COMPLICATION, WITHOUT LONG-TERM CURRENT USE OF INSULIN: ICD-10-CM

## 2020-06-22 ENCOUNTER — LAB VISIT (OUTPATIENT)
Dept: LAB | Facility: HOSPITAL | Age: 55
End: 2020-06-22
Attending: INTERNAL MEDICINE
Payer: COMMERCIAL

## 2020-06-22 DIAGNOSIS — Z20.822 EXPOSURE TO COVID-19 VIRUS: ICD-10-CM

## 2020-06-22 DIAGNOSIS — E11.9 TYPE 2 DIABETES MELLITUS WITHOUT COMPLICATION, WITHOUT LONG-TERM CURRENT USE OF INSULIN: ICD-10-CM

## 2020-06-22 LAB
ANION GAP SERPL CALC-SCNC: 9 MMOL/L (ref 8–16)
BUN SERPL-MCNC: 17 MG/DL (ref 6–20)
CALCIUM SERPL-MCNC: 9.3 MG/DL (ref 8.7–10.5)
CHLORIDE SERPL-SCNC: 105 MMOL/L (ref 95–110)
CHOLEST SERPL-MCNC: 134 MG/DL (ref 120–199)
CHOLEST/HDLC SERPL: 3.8 {RATIO} (ref 2–5)
CO2 SERPL-SCNC: 29 MMOL/L (ref 23–29)
CREAT SERPL-MCNC: 0.9 MG/DL (ref 0.5–1.4)
EST. GFR  (AFRICAN AMERICAN): >60 ML/MIN/1.73 M^2
EST. GFR  (NON AFRICAN AMERICAN): >60 ML/MIN/1.73 M^2
GLUCOSE SERPL-MCNC: 115 MG/DL (ref 70–110)
HDLC SERPL-MCNC: 35 MG/DL (ref 40–75)
HDLC SERPL: 26.1 % (ref 20–50)
LDLC SERPL CALC-MCNC: 66.4 MG/DL (ref 63–159)
NONHDLC SERPL-MCNC: 99 MG/DL
POTASSIUM SERPL-SCNC: 3.6 MMOL/L (ref 3.5–5.1)
SARS-COV-2 IGG SERPLBLD QL IA.RAPID: NEGATIVE
SODIUM SERPL-SCNC: 143 MMOL/L (ref 136–145)
TRIGL SERPL-MCNC: 163 MG/DL (ref 30–150)

## 2020-06-22 PROCEDURE — 86769 SARS-COV-2 COVID-19 ANTIBODY: CPT

## 2020-06-22 PROCEDURE — 83036 HEMOGLOBIN GLYCOSYLATED A1C: CPT

## 2020-06-22 PROCEDURE — 36415 COLL VENOUS BLD VENIPUNCTURE: CPT | Mod: PN

## 2020-06-22 PROCEDURE — 80061 LIPID PANEL: CPT

## 2020-06-22 PROCEDURE — 80048 BASIC METABOLIC PNL TOTAL CA: CPT

## 2020-06-23 ENCOUNTER — PATIENT OUTREACH (OUTPATIENT)
Dept: OTHER | Facility: OTHER | Age: 55
End: 2020-06-23

## 2020-06-23 LAB
ESTIMATED AVG GLUCOSE: 154 MG/DL (ref 68–131)
HBA1C MFR BLD HPLC: 7 % (ref 4–5.6)

## 2020-07-06 DIAGNOSIS — I10 ESSENTIAL HYPERTENSION: Primary | Chronic | ICD-10-CM

## 2020-07-06 DIAGNOSIS — I10 ESSENTIAL HYPERTENSION: Chronic | ICD-10-CM

## 2020-07-06 RX ORDER — ATENOLOL 50 MG/1
75 TABLET ORAL DAILY
Qty: 13 TABLET | Refills: 1 | Status: SHIPPED | OUTPATIENT
Start: 2020-07-06 | End: 2020-07-15 | Stop reason: SDUPTHER

## 2020-07-06 RX ORDER — VALSARTAN 320 MG/1
320 TABLET ORAL DAILY
Qty: 90 TABLET | Refills: 0 | Status: SHIPPED | OUTPATIENT
Start: 2020-07-06 | End: 2020-09-28 | Stop reason: SDUPTHER

## 2020-07-15 ENCOUNTER — OFFICE VISIT (OUTPATIENT)
Dept: INTERNAL MEDICINE | Facility: CLINIC | Age: 55
End: 2020-07-15
Payer: COMMERCIAL

## 2020-07-15 VITALS
OXYGEN SATURATION: 97 % | HEART RATE: 64 BPM | WEIGHT: 250.44 LBS | SYSTOLIC BLOOD PRESSURE: 138 MMHG | DIASTOLIC BLOOD PRESSURE: 82 MMHG | BODY MASS INDEX: 34.93 KG/M2

## 2020-07-15 DIAGNOSIS — Z12.5 SCREENING FOR PROSTATE CANCER: ICD-10-CM

## 2020-07-15 DIAGNOSIS — Z00.00 ROUTINE PHYSICAL EXAMINATION: Primary | ICD-10-CM

## 2020-07-15 DIAGNOSIS — I10 ESSENTIAL HYPERTENSION: Chronic | ICD-10-CM

## 2020-07-15 DIAGNOSIS — E11.9 TYPE 2 DIABETES MELLITUS WITHOUT COMPLICATION, WITHOUT LONG-TERM CURRENT USE OF INSULIN: ICD-10-CM

## 2020-07-15 PROCEDURE — 99396 PREV VISIT EST AGE 40-64: CPT | Mod: S$GLB,,, | Performed by: INTERNAL MEDICINE

## 2020-07-15 PROCEDURE — 3008F PR BODY MASS INDEX (BMI) DOCUMENTED: ICD-10-PCS | Mod: CPTII,S$GLB,, | Performed by: INTERNAL MEDICINE

## 2020-07-15 PROCEDURE — 99999 PR PBB SHADOW E&M-EST. PATIENT-LVL IV: CPT | Mod: PBBFAC,,, | Performed by: INTERNAL MEDICINE

## 2020-07-15 PROCEDURE — 99396 PR PREVENTIVE VISIT,EST,40-64: ICD-10-PCS | Mod: S$GLB,,, | Performed by: INTERNAL MEDICINE

## 2020-07-15 PROCEDURE — 3075F SYST BP GE 130 - 139MM HG: CPT | Mod: CPTII,S$GLB,, | Performed by: INTERNAL MEDICINE

## 2020-07-15 PROCEDURE — 3051F HG A1C>EQUAL 7.0%<8.0%: CPT | Mod: CPTII,S$GLB,, | Performed by: INTERNAL MEDICINE

## 2020-07-15 PROCEDURE — 3008F BODY MASS INDEX DOCD: CPT | Mod: CPTII,S$GLB,, | Performed by: INTERNAL MEDICINE

## 2020-07-15 PROCEDURE — 99999 PR PBB SHADOW E&M-EST. PATIENT-LVL IV: ICD-10-PCS | Mod: PBBFAC,,, | Performed by: INTERNAL MEDICINE

## 2020-07-15 PROCEDURE — 3079F DIAST BP 80-89 MM HG: CPT | Mod: CPTII,S$GLB,, | Performed by: INTERNAL MEDICINE

## 2020-07-15 PROCEDURE — 3075F PR MOST RECENT SYSTOLIC BLOOD PRESS GE 130-139MM HG: ICD-10-PCS | Mod: CPTII,S$GLB,, | Performed by: INTERNAL MEDICINE

## 2020-07-15 PROCEDURE — 3079F PR MOST RECENT DIASTOLIC BLOOD PRESSURE 80-89 MM HG: ICD-10-PCS | Mod: CPTII,S$GLB,, | Performed by: INTERNAL MEDICINE

## 2020-07-15 PROCEDURE — 3051F PR MOST RECENT HEMOGLOBIN A1C LEVEL 7.0 - < 8.0%: ICD-10-PCS | Mod: CPTII,S$GLB,, | Performed by: INTERNAL MEDICINE

## 2020-07-15 RX ORDER — ATENOLOL 50 MG/1
75 TABLET ORAL DAILY
Qty: 135 TABLET | Refills: 12 | Status: SHIPPED | OUTPATIENT
Start: 2020-07-15 | End: 2020-10-06

## 2020-07-15 RX ORDER — ALPRAZOLAM 1 MG/1
1 TABLET ORAL 2 TIMES DAILY
Qty: 15 TABLET | Refills: 4 | Status: SHIPPED | OUTPATIENT
Start: 2020-07-15 | End: 2022-07-12 | Stop reason: SDUPTHER

## 2020-07-15 RX ORDER — ATORVASTATIN CALCIUM 20 MG/1
20 TABLET, FILM COATED ORAL DAILY
Qty: 90 TABLET | Refills: 12 | Status: SHIPPED | OUTPATIENT
Start: 2020-07-15 | End: 2021-09-20

## 2020-07-15 RX ORDER — EMPAGLIFLOZIN AND METFORMIN HYDROCHLORIDE 5; 1000 MG/1; MG/1
1 TABLET ORAL 2 TIMES DAILY
Qty: 180 TABLET | Refills: 12 | Status: SHIPPED | OUTPATIENT
Start: 2020-07-15 | End: 2021-10-26 | Stop reason: SDUPTHER

## 2020-07-15 NOTE — PROGRESS NOTES
Subjective:       Patient ID: Geo Lang is a 54 y.o. male.    Chief Complaint: Annual Exam    Patient here for annual exam.  Follow-up diabetes, dyslipidemia hypertension anxiety and insomnia.  Found out that he lost his job of in the last week she was trying to get prescriptions labs and appointments updated.  He denies any problems other than mild increase in anxiety and insomnia but overall he says he feels he will be okay.  I did a patient assistance form for 1 of his diabetes meds.  Labs reviewed.  He does need an updated PSA    Review of Systems   Constitutional: Negative for chills, fatigue, fever and unexpected weight change.   HENT: Negative for nosebleeds and trouble swallowing.    Eyes: Negative for pain and visual disturbance.   Respiratory: Negative for cough, shortness of breath and wheezing.    Cardiovascular: Negative for chest pain and palpitations.   Gastrointestinal: Negative for abdominal pain, constipation, diarrhea, nausea and vomiting.   Genitourinary: Negative for difficulty urinating and hematuria.   Musculoskeletal: Negative for neck pain.   Integumentary:  Negative for rash.   Neurological: Negative for dizziness and headaches.   Hematological: Does not bruise/bleed easily.   Psychiatric/Behavioral: Positive for sleep disturbance. Negative for dysphoric mood and suicidal ideas. The patient is nervous/anxious.          Objective:      Physical Exam  Constitutional:       General: He is not in acute distress.     Appearance: He is well-developed.   HENT:      Head: Normocephalic and atraumatic.      Right Ear: Tympanic membrane, ear canal and external ear normal.      Left Ear: Tympanic membrane, ear canal and external ear normal.      Mouth/Throat:      Pharynx: No oropharyngeal exudate or posterior oropharyngeal erythema.   Eyes:      General: No scleral icterus.     Conjunctiva/sclera: Conjunctivae normal.      Pupils: Pupils are equal, round, and reactive to light.   Neck:       Musculoskeletal: Normal range of motion and neck supple.      Thyroid: No thyromegaly.      Comments: No supraclavicular nodes palpated  Cardiovascular:      Rate and Rhythm: Normal rate and regular rhythm.      Heart sounds: Normal heart sounds. No murmur.   Pulmonary:      Effort: Pulmonary effort is normal.      Breath sounds: Normal breath sounds. No wheezing.   Abdominal:      General: Bowel sounds are normal.      Palpations: Abdomen is soft. There is no mass.      Tenderness: There is no abdominal tenderness.   Musculoskeletal:         General: No tenderness.   Lymphadenopathy:      Cervical: No cervical adenopathy.   Skin:     Coloration: Skin is not pale.      Findings: No erythema or rash.   Neurological:      General: No focal deficit present.      Mental Status: He is alert and oriented to person, place, and time.   Psychiatric:         Mood and Affect: Mood normal.         Behavior: Behavior normal.         Assessment:       1. Routine physical examination    2. Type 2 diabetes mellitus without complication, without long-term current use of insulin    3. Essential hypertension    4. Screening for prostate cancer        Plan:       Geo was seen today for annual exam.    Diagnoses and all orders for this visit:    Routine physical examination    Type 2 diabetes mellitus without complication, without long-term current use of insulin  -     atorvastatin (LIPITOR) 20 MG tablet; Take 1 tablet (20 mg total) by mouth once daily.    Essential hypertension  -     atenoloL (TENORMIN) 50 MG tablet; Take 1.5 tablets (75 mg total) by mouth once daily.    Screening for prostate cancer  -     PSA, Screening; Future    Other orders  -     empagliflozin-metformin (SYNJARDY) 5-1,000 mg Tab; Take 1 tablet by mouth 2 (two) times daily.  -     ALPRAZolam (XANAX) 1 MG tablet; Take 1 tablet (1 mg total) by mouth 2 (two) times daily.

## 2020-07-16 NOTE — PROGRESS NOTES
"Digital Medicine: Health  Follow-Up    Patient reports he is well, no complaints. Denies symptoms of hypertension- HA, chest pains and SOB. Last office BP readin/82.   Discussed COVID-19 and importance of proper hand hygiene and social distancing. Recently laid off from his job.     Reports he has been taking his BP with another home device. Verbalized intent to submit another BP reading with [employer] device this week.   Memory from device:   122/74  119/70  127/81  141/82  126/70    The history is provided by the patient.               Patient needs assistance troubleshooting: patient reminder needed.          Diet-Change      Dietary Improvements:Reports his "plan" is to continue limiting sodium intake during this time. Reports he has been able to maintain adequate water intake. Cooking more at home lately.     Dietary Indiscretions:        Additional diet details:    Physical Activity-Change      He physical activity details: Reports he has been riding his bike "a little bit". Reports he is not on a routine at all "yet". Staying busy around his house. Reports he also cuts his grass often as well.       Medication Adherence-Medication adherence was asssessed.  Patient continue taking medication as prescribed.          Substance, Sleep, Stress-change  stress-  Details:  Intervention(s):    Sleep-assessed  Details:Reports his sleep patterns has been "off" lately, staying up late and waking up late.   Intervention(s):    Alcohol -  Details:  Intervention(s):    Tobacco-  Details:  Intervention(s):        PLAN  Additional monitoring needed:  Continue current diet/physical activity routine:  Provided patient education:    Patient verbalizes understanding. Patient did not express questions or concerns and patient has contact information if needed.    Explained the importance of self-monitoring and medication adherence. Encouraged the patient to communicate with their health  for lifestyle modifications to " help improve or maintain a healthy lifestyle.        There are no preventive care reminders to display for this patient.    Last 5 Patient Entered Readings                                      Current 30 Day Average: 147/83     Recent Readings 6/17/2020 6/17/2020 6/7/2020 5/21/2020 5/10/2020    SBP (mmHg) 147 152 131 125 137    DBP (mmHg) 84 81 80 72 80    Pulse 73 73 69 79 70

## 2020-08-12 ENCOUNTER — OFFICE VISIT (OUTPATIENT)
Dept: INTERNAL MEDICINE | Facility: CLINIC | Age: 55
End: 2020-08-12
Payer: MEDICAID

## 2020-08-12 VITALS
SYSTOLIC BLOOD PRESSURE: 126 MMHG | DIASTOLIC BLOOD PRESSURE: 84 MMHG | HEART RATE: 65 BPM | HEIGHT: 71 IN | OXYGEN SATURATION: 98 % | BODY MASS INDEX: 34.9 KG/M2 | WEIGHT: 249.31 LBS

## 2020-08-12 DIAGNOSIS — K57.32 DIVERTICULITIS OF LARGE INTESTINE WITHOUT PERFORATION OR ABSCESS WITHOUT BLEEDING: Primary | ICD-10-CM

## 2020-08-12 DIAGNOSIS — I10 ESSENTIAL HYPERTENSION: Chronic | ICD-10-CM

## 2020-08-12 DIAGNOSIS — E11.9 TYPE 2 DIABETES MELLITUS WITHOUT COMPLICATION, WITHOUT LONG-TERM CURRENT USE OF INSULIN: Chronic | ICD-10-CM

## 2020-08-12 DIAGNOSIS — M54.50 ACUTE RIGHT-SIDED LOW BACK PAIN WITHOUT SCIATICA: ICD-10-CM

## 2020-08-12 PROCEDURE — 99999 PR PBB SHADOW E&M-EST. PATIENT-LVL III: ICD-10-PCS | Mod: PBBFAC,,, | Performed by: INTERNAL MEDICINE

## 2020-08-12 PROCEDURE — 99214 OFFICE O/P EST MOD 30 MIN: CPT | Mod: S$PBB,,, | Performed by: INTERNAL MEDICINE

## 2020-08-12 PROCEDURE — 99213 OFFICE O/P EST LOW 20 MIN: CPT | Mod: PBBFAC | Performed by: INTERNAL MEDICINE

## 2020-08-12 PROCEDURE — 99214 PR OFFICE/OUTPT VISIT, EST, LEVL IV, 30-39 MIN: ICD-10-PCS | Mod: S$PBB,,, | Performed by: INTERNAL MEDICINE

## 2020-08-12 PROCEDURE — 99999 PR PBB SHADOW E&M-EST. PATIENT-LVL III: CPT | Mod: PBBFAC,,, | Performed by: INTERNAL MEDICINE

## 2020-08-12 RX ORDER — CYCLOBENZAPRINE HCL 10 MG
10 TABLET ORAL 3 TIMES DAILY PRN
Qty: 30 TABLET | Refills: 1 | Status: SHIPPED | OUTPATIENT
Start: 2020-08-12 | End: 2020-09-11

## 2020-08-12 NOTE — Clinical Note
Can we call pharmacy and ask about prior auth for Nexium, Synjardy and valsartan. He had to change insurance and says pharmacy is needing prior auth. Can we call pharmacy and try to help?

## 2020-08-12 NOTE — Clinical Note
Correction, Valsartan and Synjardy is at our pharmacy. Nexium is at [facility]. Can we check with both and just make sure all is ok or do the prior auth.

## 2020-08-13 RX ORDER — ESOMEPRAZOLE MAGNESIUM 40 MG/1
40 CAPSULE, DELAYED RELEASE ORAL DAILY
Qty: 30 CAPSULE | Refills: 11 | Status: SHIPPED | OUTPATIENT
Start: 2020-08-13 | End: 2020-08-13 | Stop reason: SDUPTHER

## 2020-08-13 RX ORDER — ESOMEPRAZOLE MAGNESIUM 40 MG/1
40 CAPSULE, DELAYED RELEASE ORAL DAILY
Qty: 30 CAPSULE | Refills: 11 | Status: SHIPPED | OUTPATIENT
Start: 2020-08-13 | End: 2022-01-24

## 2020-08-13 NOTE — TELEPHONE ENCOUNTER
Spoke to [facility] pharmacy. Nexium is ready for .     Called pt explained. He verbalized understanding.

## 2020-08-13 NOTE — TELEPHONE ENCOUNTER
----- Message from Pj Velasco MD sent at 8/12/2020  4:58 PM CDT -----  Correction, Valsartan and Synjardy is at our pharmacy. Nexium is at [facility]. Can we check with both and just make sure all is ok or do the prior auth.

## 2020-08-13 NOTE — PROGRESS NOTES
Subjective:       Patient ID: Geo Lang is a 54 y.o. male.    Chief Complaint: Follow-up    HPI: pt here for ER follow-up for diverticulitis. He is feeling much better. He is completing antibiotics. He denies blood in the stool and urine Diet and appetite back to gladys CT reviewed. He developed pain in the right low back as he has had before. He will treat with heat but wanted to try a muscle relaxer.  Currently, no radiation.     He is up to date on C-scope which showed Divericuli.     Review of Systems   Constitutional: Negative for chills, fatigue, fever and unexpected weight change.   HENT: Negative for nosebleeds and trouble swallowing.    Eyes: Negative for pain and visual disturbance.   Respiratory: Negative for cough, shortness of breath and wheezing.    Cardiovascular: Negative for chest pain and palpitations.   Gastrointestinal: Positive for abdominal pain (resolved). Negative for constipation, diarrhea, nausea and vomiting.   Genitourinary: Negative for difficulty urinating and hematuria.   Musculoskeletal: Positive for back pain. Negative for neck pain.   Integumentary:  Negative for rash.   Neurological: Negative for dizziness and headaches.   Hematological: Does not bruise/bleed easily.   Psychiatric/Behavioral: Negative for dysphoric mood, sleep disturbance and suicidal ideas.         Objective:      Physical Exam  Constitutional:       General: He is not in acute distress.     Appearance: He is well-developed.   HENT:      Head: Normocephalic and atraumatic.      Mouth/Throat:      Pharynx: No oropharyngeal exudate.   Eyes:      General: No scleral icterus.     Conjunctiva/sclera: Conjunctivae normal.      Pupils: Pupils are equal, round, and reactive to light.   Neck:      Musculoskeletal: Normal range of motion and neck supple.      Thyroid: No thyromegaly.      Comments: No supraclavicular nodes palpated  Cardiovascular:      Rate and Rhythm: Normal rate and regular rhythm.      Heart  sounds: Normal heart sounds. No murmur.   Pulmonary:      Effort: Pulmonary effort is normal.      Breath sounds: Normal breath sounds. No wheezing.   Abdominal:      General: Bowel sounds are normal.      Palpations: Abdomen is soft. There is no mass.      Tenderness: There is no abdominal tenderness.   Musculoskeletal:         General: Tenderness present.      Comments: - SLR   Lymphadenopathy:      Cervical: No cervical adenopathy.   Skin:     Coloration: Skin is not pale.      Findings: No rash.   Neurological:      Mental Status: He is alert and oriented to person, place, and time.         Assessment:       1. Diverticulitis of large intestine without perforation or abscess without bleeding    2. Acute right-sided low back pain without sciatica    3. Type 2 diabetes mellitus without complication, without long-term current use of insulin    4. Essential hypertension        Plan:       Geo was seen today for follow-up.    Diagnoses and all orders for this visit:    Diverticulitis of large intestine without perforation or abscess without bleeding    Acute right-sided low back pain without sciatica    Type 2 diabetes mellitus without complication, without long-term current use of insulin    Essential hypertension    Other orders  -     cyclobenzaprine (FLEXERIL) 10 MG tablet; Take 1 tablet (10 mg total) by mouth 3 (three) times daily as needed for Muscle spasms.        Call for any new or recurrent abdomen pain.

## 2020-08-13 NOTE — TELEPHONE ENCOUNTER
Spoke to [facility]. Nexium needs a PA. Waiting on PA requested to be faxed.     Called [facility] pharmacy. On July 29th pt picked up Valsartan and Synjardy.

## 2020-08-13 NOTE — TELEPHONE ENCOUNTER
Called [facility] pharmacy they said the prescription was canceled for nexium. Can you re send it?    Spoke to pt. He wants nexium to be filled at [facility].     Submitted PA for nexium. Approved.

## 2020-08-27 ENCOUNTER — PATIENT OUTREACH (OUTPATIENT)
Dept: OTHER | Facility: OTHER | Age: 55
End: 2020-08-27

## 2020-08-28 ENCOUNTER — TELEPHONE (OUTPATIENT)
Dept: PHARMACY | Facility: CLINIC | Age: 55
End: 2020-08-28

## 2020-08-28 NOTE — TELEPHONE ENCOUNTER
Good Afternoon,     The prior authorization for Geo Lang's Synjardy 5-1,000mg prescription has been APPROVED FROM 8/28/2020 TO 8/28/2021 with copayment of $0.00.       Patient has been notified of the decision on 8/28/2020 and patient states he will  medication     If there are any additional questions or concerns, please contact me.    Thank You!   Arlet Musa CPhT, [email or web address]  Patient Care Advocate   [facility]  Phone: [phone] Ext [phone]  Fax: [phone]

## 2020-09-20 PROBLEM — K57.92 ACUTE DIVERTICULITIS OF INTESTINE: Status: ACTIVE | Noted: 2020-09-20

## 2020-09-28 DIAGNOSIS — I10 ESSENTIAL HYPERTENSION: Chronic | ICD-10-CM

## 2020-09-28 RX ORDER — VALSARTAN 320 MG/1
320 TABLET ORAL DAILY
Qty: 90 TABLET | Refills: 0 | Status: SHIPPED | OUTPATIENT
Start: 2020-09-28 | End: 2020-12-21 | Stop reason: SDUPTHER

## 2020-10-13 NOTE — PROGRESS NOTES
"Digital Medicine: Health  Follow-Up    The history is provided by the patient.                 Patient needs assistance troubleshooting: patient reminder needed.      Topics Covered on Call: physical activity and Diet    Additional Follow-up details: Patient apologized for lack of readings, was recently admitted to the hospital but home now and recovering well. Patient reports he is well, no complaints. Denies symptoms of hypertension- HA, chest pains and SOB.     Reports he randomly took his BP and it was "good". Reports he has just been home recovering and "taking it easy" during this time.     Patient denies further needs from the program today.               Diet-Change    Patient reports eating or drinking the following: Was on a liquid diet when he was admitted in the hospital. Was on low-fiber for a while, but back on his regular diet now. Reports he feels "better" overall. Endorsed efforts to stay hydrated.       Physical Activity-Change      Additional physical activity details: Recovering, doing well.      Medication Adherence-Medication adherence was assessed.        Substance, Sleep, Stress-change  stress-assessed  Details:doing well with this   Intervention(s):    Sleep-assessed  Details:sleeping better now that he is home  Intervention(s):    Alcohol -not assessed  Details:  Intervention(s):    Tobacco-Not Assessed  Details:  Intervention(s):          Continue current diet/physical activity routine.  Instructed to charge device.  Provided patient education.       Addressed patient questions and patient has my contact information if needed prior to next outreach. Patient verbalizes understanding.      Explained the importance of self-monitoring and medication adherence. Encouraged the patient to communicate with their health  for lifestyle modifications to help improve or maintain a healthy lifestyle.               There are no preventive care reminders to display for this patient.      Last 5 " Patient Entered Readings                                      Current 30 Day Average:      Recent Readings 6/17/2020 6/17/2020 6/7/2020 5/21/2020 5/10/2020    SBP (mmHg) 147 152 131 125 137    DBP (mmHg) 84 81 80 72 80    Pulse 73 73 69 79 70

## 2020-10-20 ENCOUNTER — PATIENT MESSAGE (OUTPATIENT)
Dept: GASTROENTEROLOGY | Facility: CLINIC | Age: 55
End: 2020-10-20

## 2020-10-21 ENCOUNTER — TELEPHONE (OUTPATIENT)
Dept: GASTROENTEROLOGY | Facility: CLINIC | Age: 55
End: 2020-10-21

## 2020-10-21 NOTE — TELEPHONE ENCOUNTER
Pt stated he already has appt scheduled with Dr. Mays and was trying to see if he could just schedule colonoscopy. Pt was informed that we would also require an office consult due to referral being placed and that our next medicaid appt is next year near May. Pt verbalized understanding and stated he would go ahead with Dr. Mays.

## 2020-10-21 NOTE — TELEPHONE ENCOUNTER
Ma spoke with pt   Pt was given advice per dr. Isidro  Pt verbalized understadning and pt will reach out to [facility] for appointment

## 2020-10-21 NOTE — TELEPHONE ENCOUNTER
----- Message from Shira Parekh sent at 10/21/2020 10:38 AM CDT -----  Type: Needs Medical Advice    Who Called:  Patient  Best Call Back Number: [phone]  Additional Information:  Patient needs to speak with nurse concerning scheduling colonoscopy/has question/please call back to advise.

## 2020-11-19 ENCOUNTER — PATIENT OUTREACH (OUTPATIENT)
Dept: OTHER | Facility: OTHER | Age: 55
End: 2020-11-19

## 2020-11-19 DIAGNOSIS — I10 ESSENTIAL HYPERTENSION: Primary | Chronic | ICD-10-CM

## 2020-11-19 PROBLEM — K57.92 DIVERTICULITIS: Status: ACTIVE | Noted: 2020-11-19

## 2020-11-24 PROBLEM — K57.92 DIVERTICULITIS: Status: RESOLVED | Noted: 2020-11-19 | Resolved: 2020-11-24

## 2020-11-27 NOTE — PROGRESS NOTES
Digital Medicine: Clinician Follow-Up    Called patient for follow up. Patient says he is recovering from surgery. He intends to resume regular BP monitoring when he recovers.     The history is provided by the patient.      Review of patient's allergies indicates:  No Known Allergies  Follow-up reason(s): lab follow up and routine follow up.           Last 5 Patient Entered Readings                                      Current 30 Day Average:      Recent Readings 10/15/2020 6/17/2020 6/17/2020 6/7/2020 5/21/2020    SBP (mmHg) 131 147 152 131 125    DBP (mmHg) 93 84 81 80 72    Pulse 74 73 73 69 79                 Depression Screening  Did not address depression screening.    Sleep Apnea Screening    Did not address sleep apnea screening.     Medication Affordability Screening  Did not address medication affordability screening.     Medication Adherence-Medication adherence was asssessed.  Patient continue taking medication as prescribed.            ASSESSMENT(S)  Last home BP reading was taken on 10/15/20  K 3.2 on 11/24/20      Hypertension Plan  Additional monitoring needed. Take BP 2-3 times per week  Continue current therapy.  Will avoid KCL rx currently to avoid possible GI irritation given diverticulitis and recent surgery. Recommended OTC K supplement.     If BP readings are above goal, will consider adding spironolactone     Addressed patient questions and patient has my contact information if needed prior to next outreach. Patient verbalizes understanding.                 Topic    Eye Exam      There are no preventive care reminders to display for this patient.      Hypertension Medications     atenoloL (TENORMIN) 50 MG tablet TAKE 1 & 1/2 (ONE & ONE-HALF) TABLETS BY MOUTH ONCE DAILY    atenoloL (TENORMIN) 50 MG tablet Take 25 mg by mouth every evening.    chlorthalidone (HYGROTEN) 25 MG Tab Take 0.5 tablets (12.5 mg total) by mouth once daily.    valsartan (DIOVAN) 320 MG tablet Take 1 tablet by mouth  once daily

## 2020-12-07 ENCOUNTER — PATIENT OUTREACH (OUTPATIENT)
Dept: OTHER | Facility: OTHER | Age: 55
End: 2020-12-07

## 2020-12-07 NOTE — PROGRESS NOTES
"Digital Medicine: Health  Follow-Up    The history is provided by the patient.                 Patient needs assistance troubleshooting: patient reminder needed and patient plans on resuming monitoring when he recovers from surgery.      Topics Covered on Call: Diet    Additional Follow-up details: Called to check on patient, recently had surgery. Reports he is still dealing with some pain, but feeling better now. Reports that in the hospital, his BP was "funky low", possibly from pain medication and also taken while laying down. Last office BP- 112/90. Discussed elevated diastolic reading. Ate jello and drank Gatorade that day, possibly caused diastolic to elevate.             Diet-Change    Patient reports eating or drinking the following: Reports he lost about 30 lbs since the surgery. Was on a clear liquid diet for several days in the hospital. Reports he lost his appetite for a few days. Reports he has been eating a "regular diabetic diet". Reports he has been eating some "soft" foods. Reports he would like to increase fiber intake. Discussed high fiber foods. Patient comfortable with reading food labels. Reports he has "slacked up" on the water intake, now 2-3 bottles of water per day. Planning to increase intake. Enjoying Premier Protein shakes for healing.       Physical Activity-no change to routine  No change to exercise routine.       Additional physical activity details: Recovering from surgery.       Medication Adherence-Medication adherence was assessed.        Substance, Sleep, Stress-No change  stress-  Details:  Intervention(s):    Sleep-  Details:  Intervention(s):    Alcohol -  Details:  Intervention(s):    Tobacco-  Details:  Intervention(s):          Continue current diet/physical activity routine.  Provided patient education.       Addressed patient questions and patient has my contact information if needed prior to next outreach. Patient verbalizes understanding.      Explained the " importance of self-monitoring and medication adherence. Encouraged the patient to communicate with their health  for lifestyle modifications to help improve or maintain a healthy lifestyle.                   Topic    Eye Exam          Last 5 Patient Entered Readings                                      Current 30 Day Average:      Recent Readings 10/15/2020 6/17/2020 6/17/2020 6/7/2020 5/21/2020    SBP (mmHg) 131 147 152 131 125    DBP (mmHg) 93 84 81 80 72    Pulse 74 73 73 69 79

## 2020-12-16 ENCOUNTER — LAB VISIT (OUTPATIENT)
Dept: LAB | Facility: HOSPITAL | Age: 55
End: 2020-12-16
Attending: INTERNAL MEDICINE
Payer: MEDICAID

## 2020-12-16 ENCOUNTER — OFFICE VISIT (OUTPATIENT)
Dept: INTERNAL MEDICINE | Facility: CLINIC | Age: 55
End: 2020-12-16
Payer: MEDICAID

## 2020-12-16 VITALS
HEIGHT: 71 IN | DIASTOLIC BLOOD PRESSURE: 74 MMHG | BODY MASS INDEX: 30.65 KG/M2 | OXYGEN SATURATION: 95 % | SYSTOLIC BLOOD PRESSURE: 110 MMHG | HEART RATE: 86 BPM | WEIGHT: 218.94 LBS

## 2020-12-16 DIAGNOSIS — E11.9 TYPE 2 DIABETES MELLITUS WITHOUT COMPLICATION, WITHOUT LONG-TERM CURRENT USE OF INSULIN: ICD-10-CM

## 2020-12-16 DIAGNOSIS — I10 ESSENTIAL HYPERTENSION: ICD-10-CM

## 2020-12-16 DIAGNOSIS — E87.6 HYPOKALEMIA: ICD-10-CM

## 2020-12-16 DIAGNOSIS — H10.9 CONJUNCTIVITIS, UNSPECIFIED CONJUNCTIVITIS TYPE, UNSPECIFIED LATERALITY: ICD-10-CM

## 2020-12-16 DIAGNOSIS — K57.92 DIVERTICULITIS: ICD-10-CM

## 2020-12-16 DIAGNOSIS — K57.92 DIVERTICULITIS: Primary | ICD-10-CM

## 2020-12-16 LAB
ANION GAP SERPL CALC-SCNC: 12 MMOL/L (ref 8–16)
BASOPHILS # BLD AUTO: 0.03 K/UL (ref 0–0.2)
BASOPHILS NFR BLD: 0.3 % (ref 0–1.9)
BUN SERPL-MCNC: 20 MG/DL (ref 6–20)
CALCIUM SERPL-MCNC: 9.7 MG/DL (ref 8.7–10.5)
CHLORIDE SERPL-SCNC: 102 MMOL/L (ref 95–110)
CO2 SERPL-SCNC: 28 MMOL/L (ref 23–29)
CREAT SERPL-MCNC: 0.8 MG/DL (ref 0.5–1.4)
DIFFERENTIAL METHOD: ABNORMAL
EOSINOPHIL # BLD AUTO: 0.3 K/UL (ref 0–0.5)
EOSINOPHIL NFR BLD: 2.9 % (ref 0–8)
ERYTHROCYTE [DISTWIDTH] IN BLOOD BY AUTOMATED COUNT: 14.6 % (ref 11.5–14.5)
EST. GFR  (AFRICAN AMERICAN): >60 ML/MIN/1.73 M^2
EST. GFR  (NON AFRICAN AMERICAN): >60 ML/MIN/1.73 M^2
GLUCOSE SERPL-MCNC: 101 MG/DL (ref 70–110)
HCT VFR BLD AUTO: 46.2 % (ref 40–54)
HGB BLD-MCNC: 14.6 G/DL (ref 14–18)
IMM GRANULOCYTES # BLD AUTO: 0.02 K/UL (ref 0–0.04)
IMM GRANULOCYTES NFR BLD AUTO: 0.2 % (ref 0–0.5)
LYMPHOCYTES # BLD AUTO: 2.7 K/UL (ref 1–4.8)
LYMPHOCYTES NFR BLD: 28 % (ref 18–48)
MCH RBC QN AUTO: 28 PG (ref 27–31)
MCHC RBC AUTO-ENTMCNC: 31.6 G/DL (ref 32–36)
MCV RBC AUTO: 89 FL (ref 82–98)
MONOCYTES # BLD AUTO: 0.9 K/UL (ref 0.3–1)
MONOCYTES NFR BLD: 9.3 % (ref 4–15)
NEUTROPHILS # BLD AUTO: 5.6 K/UL (ref 1.8–7.7)
NEUTROPHILS NFR BLD: 59.3 % (ref 38–73)
NRBC BLD-RTO: 0 /100 WBC
PLATELET # BLD AUTO: 358 K/UL (ref 150–350)
PLATELET BLD QL SMEAR: ABNORMAL
PMV BLD AUTO: 10.3 FL (ref 9.2–12.9)
POTASSIUM SERPL-SCNC: 3.8 MMOL/L (ref 3.5–5.1)
RBC # BLD AUTO: 5.21 M/UL (ref 4.6–6.2)
SODIUM SERPL-SCNC: 142 MMOL/L (ref 136–145)
WBC # BLD AUTO: 9.5 K/UL (ref 3.9–12.7)

## 2020-12-16 PROCEDURE — 85025 COMPLETE CBC W/AUTO DIFF WBC: CPT

## 2020-12-16 PROCEDURE — 99999 PR PBB SHADOW E&M-EST. PATIENT-LVL V: CPT | Mod: PBBFAC,,, | Performed by: INTERNAL MEDICINE

## 2020-12-16 PROCEDURE — 80048 BASIC METABOLIC PNL TOTAL CA: CPT

## 2020-12-16 PROCEDURE — 99214 OFFICE O/P EST MOD 30 MIN: CPT | Mod: S$PBB,,, | Performed by: INTERNAL MEDICINE

## 2020-12-16 PROCEDURE — 36415 COLL VENOUS BLD VENIPUNCTURE: CPT

## 2020-12-16 PROCEDURE — 99999 PR PBB SHADOW E&M-EST. PATIENT-LVL V: ICD-10-PCS | Mod: PBBFAC,,, | Performed by: INTERNAL MEDICINE

## 2020-12-16 PROCEDURE — 99214 PR OFFICE/OUTPT VISIT, EST, LEVL IV, 30-39 MIN: ICD-10-PCS | Mod: S$PBB,,, | Performed by: INTERNAL MEDICINE

## 2020-12-16 PROCEDURE — 99215 OFFICE O/P EST HI 40 MIN: CPT | Mod: PBBFAC | Performed by: INTERNAL MEDICINE

## 2020-12-16 RX ORDER — HYDROXYZINE HYDROCHLORIDE 25 MG/1
25 TABLET, FILM COATED ORAL NIGHTLY PRN
Qty: 30 TABLET | Refills: 3 | Status: SHIPPED | OUTPATIENT
Start: 2020-12-16 | End: 2022-01-24

## 2020-12-16 RX ORDER — TOBRAMYCIN AND DEXAMETHASONE 3; 1 MG/ML; MG/ML
1 SUSPENSION/ DROPS OPHTHALMIC EVERY 6 HOURS
Qty: 5 ML | Refills: 0 | Status: SHIPPED | OUTPATIENT
Start: 2020-12-16 | End: 2022-01-24

## 2020-12-17 ENCOUNTER — PATIENT MESSAGE (OUTPATIENT)
Dept: INTERNAL MEDICINE | Facility: CLINIC | Age: 55
End: 2020-12-17

## 2020-12-17 ENCOUNTER — PATIENT OUTREACH (OUTPATIENT)
Dept: INTERNAL MEDICINE | Facility: CLINIC | Age: 55
End: 2020-12-17

## 2020-12-17 DIAGNOSIS — I10 ESSENTIAL HYPERTENSION: Chronic | ICD-10-CM

## 2020-12-17 NOTE — PROGRESS NOTES
Subjective:       Patient ID: Geo Lang is a 55 y.o. male.    Chief Complaint: Follow-up    Patient with history of diabetes, hypertension comes in for follow-up of surgery for diverticulitis.  He had several attacks in the last 4 months and had a part of his sigmoid colon removed.  There was some adhesions to the anterior abdominal wall noted and the laparoscopic case was converted to open.  He had some mild hypokalemia and mild elevated platelet count upon discharge.  The potassium may come from the chlorthalidone.  He will be due for a follow-up colonoscopy in 3 years because he had adenomatous polyps.  These were done outside of our system.  He would like something to take regularly for sleep so we talked about trying hydroxyzine.    Review of Systems   Constitutional: Negative for chills, fatigue and fever.   HENT: Negative for nosebleeds and trouble swallowing.    Eyes: Negative for pain and visual disturbance.   Respiratory: Negative for cough, shortness of breath and wheezing.    Cardiovascular: Negative for chest pain and palpitations.   Gastrointestinal: Positive for abdominal pain (post op pain). Negative for constipation, diarrhea, nausea and vomiting.   Genitourinary: Negative for difficulty urinating and hematuria.   Musculoskeletal: Negative for arthralgias, back pain and neck pain.   Integumentary:  Negative for rash.   Neurological: Negative for dizziness and headaches.   Hematological: Does not bruise/bleed easily.   Psychiatric/Behavioral: Positive for sleep disturbance. Negative for dysphoric mood.         Objective:      Physical Exam  Constitutional:       General: He is not in acute distress.     Appearance: He is well-developed.   HENT:      Head: Normocephalic and atraumatic.      Right Ear: Tympanic membrane, ear canal and external ear normal.      Left Ear: Tympanic membrane, ear canal and external ear normal.      Mouth/Throat:      Pharynx: No oropharyngeal exudate or posterior  oropharyngeal erythema.   Eyes:      General: No scleral icterus.     Conjunctiva/sclera: Conjunctivae normal.      Pupils: Pupils are equal, round, and reactive to light.   Neck:      Musculoskeletal: Normal range of motion and neck supple.      Thyroid: No thyromegaly.      Comments: No supraclavicular nodes palpated  Cardiovascular:      Rate and Rhythm: Normal rate and regular rhythm.      Pulses: Normal pulses.      Heart sounds: Normal heart sounds. No murmur.   Pulmonary:      Effort: Pulmonary effort is normal.      Breath sounds: Normal breath sounds. No wheezing.   Abdominal:      General: Bowel sounds are normal.      Palpations: Abdomen is soft. There is no mass.      Tenderness: There is abdominal tenderness (Postop tenderness but no infection at the incisions).   Musculoskeletal:         General: No tenderness.      Right lower leg: No edema.      Left lower leg: No edema.   Lymphadenopathy:      Cervical: No cervical adenopathy.   Skin:     Coloration: Skin is not jaundiced or pale.   Neurological:      General: No focal deficit present.      Mental Status: He is alert and oriented to person, place, and time.   Psychiatric:         Mood and Affect: Mood normal.         Behavior: Behavior normal.         Assessment:       1. Diverticulitis    2. Type 2 diabetes mellitus without complication, without long-term current use of insulin    3. Essential hypertension    4. Hypokalemia    5. Conjunctivitis, unspecified conjunctivitis type, unspecified laterality        Plan:       Geo was seen today for follow-up.    Diagnoses and all orders for this visit:    Diverticulitis  -     CBC Auto Differential; Future  -     Basic Metabolic Panel; Future    Type 2 diabetes mellitus without complication, without long-term current use of insulin  -     CBC Auto Differential; Future  -     Basic Metabolic Panel; Future    Essential hypertension  -     CBC Auto Differential; Future  -     Basic Metabolic Panel;  Future    Hypokalemia  -     CBC Auto Differential; Future  -     Basic Metabolic Panel; Future    Conjunctivitis, unspecified conjunctivitis type, unspecified laterality  -     tobramycin-dexamethasone 0.3-0.1% (TOBRADEX) 0.3-0.1 % DrpS; Place 1 drop into both eyes every 6 (six) hours.    Other orders  -     hydrOXYzine HCL (ATARAX) 25 MG tablet; Take 1 tablet (25 mg total) by mouth nightly as needed (insomnia).

## 2020-12-17 NOTE — TELEPHONE ENCOUNTER
No new care gaps identified.  Powered by [employer]. Reference number: [id number]. 12/17/2020 5:26:18 PM   CST

## 2020-12-20 RX ORDER — ATENOLOL 50 MG/1
TABLET ORAL
Qty: 135 TABLET | Refills: 3 | Status: SHIPPED | OUTPATIENT
Start: 2020-12-20 | End: 2021-12-20 | Stop reason: SDUPTHER

## 2020-12-20 NOTE — PROGRESS NOTES
Refill Authorization Note   Geo Lang  is requesting a refill authorization.  Brief Assessment and Rationale for Refill:  Approve     Medication Therapy Plan:  [facility]    Medication Reconciliation Completed: No   Comments:   Orders Placed This Encounter    atenoloL (TENORMIN) 50 MG tablet      Requested Prescriptions   Signed Prescriptions Disp Refills    atenoloL (TENORMIN) 50 MG tablet 135 tablet 3     Sig: TAKE 1 & 1/2 (ONE & ONE-HALF) TABLETS BY MOUTH ONCE DAILY       Cardiovascular:  Beta Blockers Passed - 12/17/2020  5:25 PM        Passed - Patient is at least 18 years old        Passed - Last BP in normal range within 360 days.     BP Readings from Last 3 Encounters:   12/16/20 110/74   12/03/20 (!) 112/90   11/24/20 97/64              Passed - Last Heart Rate in normal range within 360 days.     Pulse Readings from Last 3 Encounters:   12/16/20 86   12/03/20 63   11/24/20 73             Passed - Office visit in past 12 months or future 90 days     Recent Outpatient Visits            4 days ago Diverticulitis    [facility] Pj Velasco MD    2 weeks ago Postoperative visit    [facility] MD    2 months ago Diverticulitis    [facility] MD    2 months ago Diverticulitis    [facility] Fish MD    4 months ago Diverticulitis of large intestine without perforation or abscess without bleeding    [facility] Pj Velasco MD          Future Appointments              In 1 month Ha [facility]ias [facility] - Podiatry, [place]    In 1 month STEV[facility] - Optometry 1st Fl, [facility]                    Appointments  past 12m or future 3m with PCP    Date Provider   Last Visit   12/16/2020 Pj Velasco MD   Next Visit   Visit date not found Pj Velasco MD   ED visits in past 90 days: 0     Note composed:5:15 PM 12/20/2020

## 2020-12-21 ENCOUNTER — PATIENT MESSAGE (OUTPATIENT)
Dept: INTERNAL MEDICINE | Facility: CLINIC | Age: 55
End: 2020-12-21

## 2020-12-21 ENCOUNTER — TELEPHONE (OUTPATIENT)
Dept: INTERNAL MEDICINE | Facility: CLINIC | Age: 55
End: 2020-12-21

## 2020-12-21 DIAGNOSIS — I10 ESSENTIAL HYPERTENSION: Chronic | ICD-10-CM

## 2020-12-21 RX ORDER — VALSARTAN 320 MG/1
320 TABLET ORAL DAILY
Qty: 90 TABLET | Refills: 0 | Status: SHIPPED | OUTPATIENT
Start: 2020-12-21 | End: 2021-02-15 | Stop reason: SDUPTHER

## 2020-12-21 NOTE — TELEPHONE ENCOUNTER
No new care gaps identified.  Powered by [employer]. Reference number: [id number]. 12/21/2020 3:06:48 PM   CST

## 2020-12-24 ENCOUNTER — PATIENT OUTREACH (OUTPATIENT)
Dept: ADMINISTRATIVE | Facility: HOSPITAL | Age: 55
End: 2020-12-24

## 2020-12-24 NOTE — PROGRESS NOTES
Requesting External Colonoscopy Report from [facility] Dr. Josue Mays

## 2021-01-19 ENCOUNTER — PATIENT MESSAGE (OUTPATIENT)
Dept: PODIATRY | Facility: CLINIC | Age: 56
End: 2021-01-19

## 2021-01-19 ENCOUNTER — TELEPHONE (OUTPATIENT)
Dept: PODIATRY | Facility: CLINIC | Age: 56
End: 2021-01-19

## 2021-01-19 ENCOUNTER — PATIENT OUTREACH (OUTPATIENT)
Dept: ADMINISTRATIVE | Facility: OTHER | Age: 56
End: 2021-01-19

## 2021-02-15 DIAGNOSIS — I10 ESSENTIAL HYPERTENSION: Chronic | ICD-10-CM

## 2021-02-15 RX ORDER — VALSARTAN 320 MG/1
320 TABLET ORAL DAILY
Qty: 90 TABLET | Refills: 0 | Status: SHIPPED | OUTPATIENT
Start: 2021-02-15 | End: 2021-06-08 | Stop reason: SDUPTHER

## 2021-02-20 ENCOUNTER — PATIENT OUTREACH (OUTPATIENT)
Dept: ADMINISTRATIVE | Facility: OTHER | Age: 56
End: 2021-02-20

## 2021-02-22 ENCOUNTER — OFFICE VISIT (OUTPATIENT)
Dept: OPTOMETRY | Facility: CLINIC | Age: 56
End: 2021-02-22
Payer: MEDICAID

## 2021-02-22 DIAGNOSIS — H52.13 MYOPIA WITH PRESBYOPIA OF BOTH EYES: ICD-10-CM

## 2021-02-22 DIAGNOSIS — E11.9 DIABETES MELLITUS TYPE 2 WITHOUT RETINOPATHY: Primary | ICD-10-CM

## 2021-02-22 DIAGNOSIS — H52.4 MYOPIA WITH PRESBYOPIA OF BOTH EYES: ICD-10-CM

## 2021-02-22 DIAGNOSIS — H40.013 OAG (OPEN ANGLE GLAUCOMA) SUSPECT, LOW RISK, BILATERAL: ICD-10-CM

## 2021-02-22 PROCEDURE — 99212 OFFICE O/P EST SF 10 MIN: CPT | Mod: PBBFAC | Performed by: OPTOMETRIST

## 2021-02-22 PROCEDURE — 92014 PR EYE EXAM, EST PATIENT,COMPREHESV: ICD-10-PCS | Mod: S$PBB,,, | Performed by: OPTOMETRIST

## 2021-02-22 PROCEDURE — 99999 PR PBB SHADOW E&M-EST. PATIENT-LVL II: ICD-10-PCS | Mod: PBBFAC,,, | Performed by: OPTOMETRIST

## 2021-02-22 PROCEDURE — 99999 PR PBB SHADOW E&M-EST. PATIENT-LVL II: CPT | Mod: PBBFAC,,, | Performed by: OPTOMETRIST

## 2021-02-22 PROCEDURE — 92014 COMPRE OPH EXAM EST PT 1/>: CPT | Mod: S$PBB,,, | Performed by: OPTOMETRIST

## 2021-02-23 ENCOUNTER — OFFICE VISIT (OUTPATIENT)
Dept: PODIATRY | Facility: CLINIC | Age: 56
End: 2021-02-23
Payer: MEDICAID

## 2021-02-23 VITALS — WEIGHT: 218.94 LBS | HEIGHT: 71 IN | BODY MASS INDEX: 30.65 KG/M2

## 2021-02-23 DIAGNOSIS — M20.12 VALGUS DEFORMITY OF BOTH GREAT TOES: ICD-10-CM

## 2021-02-23 DIAGNOSIS — M20.11 VALGUS DEFORMITY OF BOTH GREAT TOES: ICD-10-CM

## 2021-02-23 DIAGNOSIS — L84 CORN OR CALLUS: ICD-10-CM

## 2021-02-23 DIAGNOSIS — E11.9 TYPE 2 DIABETES MELLITUS WITHOUT COMPLICATION, WITHOUT LONG-TERM CURRENT USE OF INSULIN: Primary | ICD-10-CM

## 2021-02-23 DIAGNOSIS — M21.621 TAILOR'S BUNION OF BOTH FEET: ICD-10-CM

## 2021-02-23 DIAGNOSIS — M21.622 TAILOR'S BUNION OF BOTH FEET: ICD-10-CM

## 2021-02-23 PROCEDURE — 99213 PR OFFICE/OUTPT VISIT, EST, LEVL III, 20-29 MIN: ICD-10-PCS | Mod: S$PBB,,, | Performed by: PODIATRIST

## 2021-02-23 PROCEDURE — 99999 PR PBB SHADOW E&M-EST. PATIENT-LVL II: CPT | Mod: PBBFAC,,, | Performed by: PODIATRIST

## 2021-02-23 PROCEDURE — 99213 OFFICE O/P EST LOW 20 MIN: CPT | Mod: S$PBB,,, | Performed by: PODIATRIST

## 2021-02-23 PROCEDURE — 99999 PR PBB SHADOW E&M-EST. PATIENT-LVL II: ICD-10-PCS | Mod: PBBFAC,,, | Performed by: PODIATRIST

## 2021-02-23 PROCEDURE — 99212 OFFICE O/P EST SF 10 MIN: CPT | Mod: PBBFAC,PN | Performed by: PODIATRIST

## 2021-03-10 ENCOUNTER — PATIENT OUTREACH (OUTPATIENT)
Dept: ADMINISTRATIVE | Facility: HOSPITAL | Age: 56
End: 2021-03-10

## 2021-04-27 ENCOUNTER — TELEPHONE (OUTPATIENT)
Dept: PHARMACY | Facility: CLINIC | Age: 56
End: 2021-04-27

## 2021-05-14 ENCOUNTER — PATIENT OUTREACH (OUTPATIENT)
Dept: ADMINISTRATIVE | Facility: HOSPITAL | Age: 56
End: 2021-05-14

## 2021-06-08 DIAGNOSIS — I10 ESSENTIAL HYPERTENSION: Chronic | ICD-10-CM

## 2021-06-08 RX ORDER — VALSARTAN 320 MG/1
320 TABLET ORAL DAILY
Qty: 90 TABLET | Refills: 0 | Status: SHIPPED | OUTPATIENT
Start: 2021-06-08 | End: 2021-10-25 | Stop reason: SDUPTHER

## 2021-06-08 RX ORDER — VALSARTAN 320 MG/1
320 TABLET ORAL DAILY
Qty: 90 TABLET | Refills: 0 | Status: CANCELLED | OUTPATIENT
Start: 2021-06-08

## 2021-08-03 ENCOUNTER — PATIENT MESSAGE (OUTPATIENT)
Dept: ADMINISTRATIVE | Facility: HOSPITAL | Age: 56
End: 2021-08-03

## 2021-09-16 DIAGNOSIS — E11.9 TYPE 2 DIABETES MELLITUS WITHOUT COMPLICATION, WITHOUT LONG-TERM CURRENT USE OF INSULIN: ICD-10-CM

## 2021-09-20 RX ORDER — ATORVASTATIN CALCIUM 20 MG/1
TABLET, FILM COATED ORAL
Qty: 90 TABLET | Refills: 0 | Status: SHIPPED | OUTPATIENT
Start: 2021-09-20 | End: 2021-12-20 | Stop reason: SDUPTHER

## 2021-10-14 NOTE — PROGRESS NOTES
HPI     Diabetic Eye Exam      Additional comments: yearly check              Comments     DLS 8/23/17 -- Patient here for yearly diabetic exam. He got new glasses   RX in feb 2018. He feels his DM is well controlled    ISN=323 last week    Hemoglobin A1C       Date                     Value               Ref Range             Status                08/07/2018               6.4 (H)             4.0 - 5.6 %           Final                 02/02/2018               6.1 (H)             4.0 - 5.6 %           Final                  09/27/2017               6.2 (H)             4.0 - 5.6 %           Final               ----------          Last edited by Hao Austin, STEVE on 8/15/2018 10:00 AM. (History)            Assessment /Plan     For exam results, see Encounter Report.    Diabetes mellitus type 2 without retinopathy  -No retinopathy noted today.  Continued control with primary care physician and annual comprehensive eye exam.      RTC 1 yr                 
pain in L foot

## 2021-10-25 DIAGNOSIS — I10 ESSENTIAL HYPERTENSION: Chronic | ICD-10-CM

## 2021-10-25 RX ORDER — VALSARTAN 320 MG/1
320 TABLET ORAL DAILY
Qty: 90 TABLET | Refills: 0 | Status: SHIPPED | OUTPATIENT
Start: 2021-10-25 | End: 2022-01-03 | Stop reason: SDUPTHER

## 2021-10-26 RX ORDER — EMPAGLIFLOZIN AND METFORMIN HYDROCHLORIDE 5; 1000 MG/1; MG/1
1 TABLET ORAL 2 TIMES DAILY
Qty: 180 TABLET | Refills: 12 | Status: SHIPPED | OUTPATIENT
Start: 2021-10-26 | End: 2022-10-10 | Stop reason: SDUPTHER

## 2021-11-12 ENCOUNTER — TELEPHONE (OUTPATIENT)
Dept: INTERNAL MEDICINE | Facility: CLINIC | Age: 56
End: 2021-11-12
Payer: MEDICAID

## 2021-11-12 DIAGNOSIS — E11.9 TYPE 2 DIABETES MELLITUS WITHOUT COMPLICATION, WITHOUT LONG-TERM CURRENT USE OF INSULIN: Primary | ICD-10-CM

## 2021-11-22 ENCOUNTER — IMMUNIZATION (OUTPATIENT)
Dept: PHARMACY | Facility: CLINIC | Age: 56
End: 2021-11-22
Payer: COMMERCIAL

## 2021-11-22 ENCOUNTER — LAB VISIT (OUTPATIENT)
Dept: LAB | Facility: HOSPITAL | Age: 56
End: 2021-11-22
Attending: INTERNAL MEDICINE
Payer: COMMERCIAL

## 2021-11-22 ENCOUNTER — TELEPHONE (OUTPATIENT)
Dept: PHARMACY | Facility: CLINIC | Age: 56
End: 2021-11-22

## 2021-11-22 DIAGNOSIS — Z23 NEED FOR VACCINATION: Primary | ICD-10-CM

## 2021-11-22 DIAGNOSIS — E11.9 TYPE 2 DIABETES MELLITUS WITHOUT COMPLICATION, WITHOUT LONG-TERM CURRENT USE OF INSULIN: ICD-10-CM

## 2021-11-22 LAB
ESTIMATED AVG GLUCOSE: 148 MG/DL (ref 68–131)
HBA1C MFR BLD: 6.8 % (ref 4–5.6)

## 2021-11-22 PROCEDURE — 83036 HEMOGLOBIN GLYCOSYLATED A1C: CPT | Performed by: INTERNAL MEDICINE

## 2021-11-22 PROCEDURE — 36415 COLL VENOUS BLD VENIPUNCTURE: CPT | Mod: PO | Performed by: INTERNAL MEDICINE

## 2021-12-20 ENCOUNTER — PATIENT MESSAGE (OUTPATIENT)
Dept: INTERNAL MEDICINE | Facility: CLINIC | Age: 56
End: 2021-12-20
Payer: MEDICAID

## 2021-12-20 DIAGNOSIS — E11.9 TYPE 2 DIABETES MELLITUS WITHOUT COMPLICATION, WITHOUT LONG-TERM CURRENT USE OF INSULIN: ICD-10-CM

## 2021-12-20 DIAGNOSIS — I10 ESSENTIAL HYPERTENSION: Chronic | ICD-10-CM

## 2021-12-20 RX ORDER — ATENOLOL 50 MG/1
75 TABLET ORAL DAILY
Qty: 135 TABLET | Refills: 3 | Status: SHIPPED | OUTPATIENT
Start: 2021-12-20 | End: 2022-01-03 | Stop reason: SDUPTHER

## 2021-12-20 RX ORDER — ATORVASTATIN CALCIUM 20 MG/1
20 TABLET, FILM COATED ORAL DAILY
Qty: 90 TABLET | Refills: 3 | Status: SHIPPED | OUTPATIENT
Start: 2021-12-20 | End: 2022-10-20 | Stop reason: SDUPTHER

## 2021-12-20 RX ORDER — ONDANSETRON 4 MG/1
4 TABLET, FILM COATED ORAL EVERY 6 HOURS PRN
Qty: 20 TABLET | Refills: 0 | Status: ON HOLD | OUTPATIENT
Start: 2021-12-20 | End: 2022-02-11 | Stop reason: SDUPTHER

## 2021-12-22 RX ORDER — ATORVASTATIN CALCIUM 20 MG/1
TABLET, FILM COATED ORAL
Qty: 90 TABLET | Refills: 0 | Status: SHIPPED | OUTPATIENT
Start: 2021-12-22 | End: 2022-01-24

## 2021-12-22 RX ORDER — ATENOLOL 50 MG/1
TABLET ORAL
Qty: 135 TABLET | Refills: 0 | Status: SHIPPED | OUTPATIENT
Start: 2021-12-22 | End: 2023-02-05

## 2022-01-03 DIAGNOSIS — I10 ESSENTIAL HYPERTENSION: Chronic | ICD-10-CM

## 2022-01-03 RX ORDER — VALSARTAN 320 MG/1
320 TABLET ORAL DAILY
Qty: 90 TABLET | Refills: 0 | Status: SHIPPED | OUTPATIENT
Start: 2022-01-03 | End: 2022-04-21 | Stop reason: SDUPTHER

## 2022-01-26 ENCOUNTER — PATIENT MESSAGE (OUTPATIENT)
Dept: ADMINISTRATIVE | Facility: HOSPITAL | Age: 57
End: 2022-01-26
Payer: MEDICAID

## 2022-02-08 PROBLEM — K63.89 PNEUMATOSIS INTESTINALIS OF SMALL INTESTINE: Status: ACTIVE | Noted: 2022-02-08

## 2022-02-14 PROBLEM — R11.0 NAUSEA: Status: ACTIVE | Noted: 2022-02-14

## 2022-02-14 PROBLEM — Z98.890 POSTOPERATIVE NAUSEA: Status: ACTIVE | Noted: 2022-02-14

## 2022-03-16 ENCOUNTER — PATIENT MESSAGE (OUTPATIENT)
Dept: ADMINISTRATIVE | Facility: HOSPITAL | Age: 57
End: 2022-03-16
Payer: COMMERCIAL

## 2022-04-21 DIAGNOSIS — I10 ESSENTIAL HYPERTENSION: Chronic | ICD-10-CM

## 2022-04-21 RX ORDER — VALSARTAN 320 MG/1
320 TABLET ORAL DAILY
Qty: 90 TABLET | Refills: 0 | Status: SHIPPED | OUTPATIENT
Start: 2022-04-21 | End: 2022-07-13 | Stop reason: SDUPTHER

## 2022-04-21 NOTE — TELEPHONE ENCOUNTER
Care Due:                  Date            Visit Type   Department     Provider  --------------------------------------------------------------------------------                                EP -                              PRIMARY      [facility]  Last Visit: 12-      [facility] ([facility])   MEDICINE       Pj Velasco  Next Visit: None Scheduled  None         None Found                                                            Last  Test          Frequency    Reason                     Performed    Due Date  --------------------------------------------------------------------------------    Office Visit  12 months..  atorvastatin,              12- 12-                             empagliflozin-metformin,                             valsartan................    HBA1C.......  6 months...  empagliflozin-metformin..  11- 05-    Lipid Panel.  12 months..  atorvastatin.............  06- 06-    Powered by [employer]. Reference number: [id number].   4/21/2022 1:36:04 PM CDT

## 2022-04-21 NOTE — TELEPHONE ENCOUNTER
Refill Routing Note   Medication(s) are not appropriate for processing by [facility] for the following reason(s):      - Patient has not been seen in over 15 months by PCP  - Patient has been seen in the ED/Hospital since the last PCP visit    [facility] action(s):  Defer Medication-related problems identified:   Requires labs  Requires appointment        Medication reconciliation completed: No     Appointments  past 12m or future 3m with PCP    Date Provider   Last Visit   12/16/2020 Pj Velasco MD   Next Visit   Visit date not found Pj Velasco MD   ED visits in past 90 days: 1        Note composed:1:44 PM 04/21/2022

## 2022-06-07 ENCOUNTER — OFFICE VISIT (OUTPATIENT)
Dept: URGENT CARE | Facility: CLINIC | Age: 57
End: 2022-06-07
Payer: COMMERCIAL

## 2022-06-07 VITALS
DIASTOLIC BLOOD PRESSURE: 79 MMHG | OXYGEN SATURATION: 95 % | HEART RATE: 91 BPM | SYSTOLIC BLOOD PRESSURE: 115 MMHG | HEIGHT: 71 IN | BODY MASS INDEX: 34.44 KG/M2 | TEMPERATURE: 99 F | WEIGHT: 246 LBS | RESPIRATION RATE: 16 BRPM

## 2022-06-07 DIAGNOSIS — M46.1 SACROILIITIS: Primary | ICD-10-CM

## 2022-06-07 PROCEDURE — 99213 PR OFFICE/OUTPT VISIT, EST, LEVL III, 20-29 MIN: ICD-10-PCS | Mod: 25,S$GLB,, | Performed by: PHYSICIAN ASSISTANT

## 2022-06-07 PROCEDURE — 1159F PR MEDICATION LIST DOCUMENTED IN MEDICAL RECORD: ICD-10-PCS | Mod: CPTII,S$GLB,, | Performed by: PHYSICIAN ASSISTANT

## 2022-06-07 PROCEDURE — 4010F PR ACE/ARB THEARPY RXD/TAKEN: ICD-10-PCS | Mod: CPTII,S$GLB,, | Performed by: PHYSICIAN ASSISTANT

## 2022-06-07 PROCEDURE — 3008F PR BODY MASS INDEX (BMI) DOCUMENTED: ICD-10-PCS | Mod: CPTII,S$GLB,, | Performed by: PHYSICIAN ASSISTANT

## 2022-06-07 PROCEDURE — 3074F PR MOST RECENT SYSTOLIC BLOOD PRESSURE < 130 MM HG: ICD-10-PCS | Mod: CPTII,S$GLB,, | Performed by: PHYSICIAN ASSISTANT

## 2022-06-07 PROCEDURE — 3008F BODY MASS INDEX DOCD: CPT | Mod: CPTII,S$GLB,, | Performed by: PHYSICIAN ASSISTANT

## 2022-06-07 PROCEDURE — 1160F RVW MEDS BY RX/DR IN RCRD: CPT | Mod: CPTII,S$GLB,, | Performed by: PHYSICIAN ASSISTANT

## 2022-06-07 PROCEDURE — 1160F PR REVIEW ALL MEDS BY PRESCRIBER/CLIN PHARMACIST DOCUMENTED: ICD-10-PCS | Mod: CPTII,S$GLB,, | Performed by: PHYSICIAN ASSISTANT

## 2022-06-07 PROCEDURE — 3074F SYST BP LT 130 MM HG: CPT | Mod: CPTII,S$GLB,, | Performed by: PHYSICIAN ASSISTANT

## 2022-06-07 PROCEDURE — 3072F LOW RISK FOR RETINOPATHY: CPT | Mod: CPTII,S$GLB,, | Performed by: PHYSICIAN ASSISTANT

## 2022-06-07 PROCEDURE — 96372 THER/PROPH/DIAG INJ SC/IM: CPT | Mod: S$GLB,,, | Performed by: PHYSICIAN ASSISTANT

## 2022-06-07 PROCEDURE — 4010F ACE/ARB THERAPY RXD/TAKEN: CPT | Mod: CPTII,S$GLB,, | Performed by: PHYSICIAN ASSISTANT

## 2022-06-07 PROCEDURE — 3078F PR MOST RECENT DIASTOLIC BLOOD PRESSURE < 80 MM HG: ICD-10-PCS | Mod: CPTII,S$GLB,, | Performed by: PHYSICIAN ASSISTANT

## 2022-06-07 PROCEDURE — 99213 OFFICE O/P EST LOW 20 MIN: CPT | Mod: 25,S$GLB,, | Performed by: PHYSICIAN ASSISTANT

## 2022-06-07 PROCEDURE — 1159F MED LIST DOCD IN RCRD: CPT | Mod: CPTII,S$GLB,, | Performed by: PHYSICIAN ASSISTANT

## 2022-06-07 PROCEDURE — 3078F DIAST BP <80 MM HG: CPT | Mod: CPTII,S$GLB,, | Performed by: PHYSICIAN ASSISTANT

## 2022-06-07 PROCEDURE — 96372 PR INJECTION,THERAP/PROPH/DIAG2ST, IM OR SUBCUT: ICD-10-PCS | Mod: S$GLB,,, | Performed by: PHYSICIAN ASSISTANT

## 2022-06-07 PROCEDURE — 3072F PR LOW RISK FOR RETINOPATHY: ICD-10-PCS | Mod: CPTII,S$GLB,, | Performed by: PHYSICIAN ASSISTANT

## 2022-06-07 RX ORDER — KETOROLAC TROMETHAMINE 30 MG/ML
30 INJECTION, SOLUTION INTRAMUSCULAR; INTRAVENOUS
Status: COMPLETED | OUTPATIENT
Start: 2022-06-07 | End: 2022-06-07

## 2022-06-07 RX ORDER — METHOCARBAMOL 750 MG/1
750 TABLET, FILM COATED ORAL 3 TIMES DAILY
Qty: 30 TABLET | Refills: 0 | Status: SHIPPED | OUTPATIENT
Start: 2022-06-07 | End: 2022-06-17

## 2022-06-07 RX ORDER — IBUPROFEN 800 MG/1
800 TABLET ORAL 3 TIMES DAILY
Qty: 30 TABLET | Refills: 0 | Status: SHIPPED | OUTPATIENT
Start: 2022-06-07 | End: 2022-06-17

## 2022-06-07 RX ADMIN — KETOROLAC TROMETHAMINE 30 MG: 30 INJECTION, SOLUTION INTRAMUSCULAR; INTRAVENOUS at 06:06

## 2022-06-07 NOTE — PROGRESS NOTES
"Subjective:       Patient ID: Geo Lang is a 56 y.o. male.    Vitals:  height is 5' 11" (1.803 m) and weight is 111.6 kg (246 lb). His oral temperature is 98.5 °F (36.9 °C). His blood pressure is 115/79 and his pulse is 91. His respiration is 16 and oxygen saturation is 95%.     Chief Complaint: Back Pain    Pt presents with lower back pain on rt side x 3 days. OTC taken with no results pain scale is 8/10 while moving to a standing position and sitting 0/10.  Patient states pain started gradually as he was getting in and out of his chair all day on Saturday for work.  Patient has tried Tylenol and Aleve with little relief.  Has not taken anything over the last 2 days.    Back Pain  This is a recurrent problem. The current episode started in the past 7 days. The problem occurs constantly. The problem is unchanged. The pain is present in the lumbar spine. The pain is at a severity of 8/10. The pain is severe. The pain is the same all the time. The symptoms are aggravated by bending, twisting, standing and position. Pertinent negatives include no bladder incontinence or numbness. Treatments tried: tylonol, alive. The treatment provided no relief.       Gastrointestinal: Negative for constipation and diarrhea.   Genitourinary: Negative for urine decreased and bladder incontinence.   Musculoskeletal: Positive for back pain and muscle cramps.   Neurological: Negative for numbness and tingling.       Objective:      Physical Exam   Constitutional: He does not appear ill. No distress.   HENT:   Head: Normocephalic and atraumatic.   Ears:   Right Ear: External ear normal.   Left Ear: External ear normal.   Eyes: Conjunctivae are normal. Right eye exhibits no discharge. Left eye exhibits no discharge. Extraocular movement intact   Pulmonary/Chest: Effort normal. No respiratory distress.   Abdominal: Normal appearance.   Musculoskeletal:      Comments: 5/5 strength in bilateral lower extremities.  Normal sensation light " touch bilateral lower extremities.  Tenderness to palpation over right SI joint.  Positive Ashok's on the right.  Negative straight leg raise bilaterally.   Neurological: no focal deficit. He is alert.   Skin: Skin is warm, dry and not pale. jaundice  Psychiatric: His behavior is normal. Mood, judgment and thought content normal.   Nursing note and vitals reviewed.        Assessment:       1. Sacroiliitis          Plan:         Sacroiliitis    Other orders  -     ketorolac injection 30 mg  -     methocarbamoL (ROBAXIN) 750 MG Tab; Take 1 tablet (750 mg total) by mouth 3 (three) times daily. for 10 days  Dispense: 30 tablet; Refill: 0  -     ibuprofen (ADVIL,MOTRIN) 800 MG tablet; Take 1 tablet (800 mg total) by mouth 3 (three) times daily. for 10 days  Dispense: 30 tablet; Refill: 0    Discussed if no improvement 1 weeks time follow up with PCP or ortho spine.  Patient states understanding and agrees with plan.     Sacroiliac Joint Pain   About this topic   The spine ends in a set of 5 fused bones. They are called the sacrum. They join the pelvis at the sacroiliac joint. This is also called the SI joint. Strong bands of tissue called ligaments hold this joint together. Normally, there is very little movement at this joint. Its job is to absorb shock and take stress off of the spine. You can have SI joint pain if this joint is irritated.  What are the causes?   Sometimes, the exact cause of SI pain is unknown. It is not always known if the pain is in the joint or in the ligaments around the joint. The pain may be caused by wear and tear on the joint from arthritis. Pregnancy causes this joint to become looser. Sometimes, the pain may be caused by swelling from problems like gout or an injury. Infection or a fracture can also cause SI pain.   What can make this more likely to happen?   You are more likely to have this problem if you have had an injury to your spine, pelvis, or buttocks. Someone with a history of being  pregnant a few times is more likely to have problems with her SI joint. Legs that are not the same length can cause pain as well. Some infections may cause problems with the SI joint.  What are the main signs?   · Pain in the lower back or buttocks. It may also be felt in the hips or pelvis. Some people feel the pain in the groin or back of the legs. This pain is often worse with activity like climbing stairs or standing for a long time.  · Numbness or tingling in the upper leg  · Feeling of weakness in the leg  · Stiffness  · Feeling unstable when moving  How does the doctor diagnose this health problem?   The doctor will do an exam and feel around your lower back. Your doctor will have you bend and twist at the waist to see what makes the pain worse. Your doctor may have you move and push and pull on your legs to test your motion and strength. Your doctor will check if the muscles in the back or legs are tight. Your doctor may check the feeling and reflexes in your legs.  The doctor may order:  · X-ray  · CT or MRI scan  · Bone scan  · Lab tests  · Diagnostic injection ? injecting a drug into the joint to see if relief is given  How does the doctor treat this health problem?   · Rest from activities that make the problem worse  · Ice  · Heat may be used later but not right away. Heat can make swelling worse.  · Special belt worn low on the hips called an SI belt. It helps to hold the joint tightly together.  · Electrical stimulation  · Exercises  · Chiropractic manipulation  · Radiofrequency ablation ? A treatment where small nerves around the joint are burned so they are numb. This does not always work. It may only last for a few years.  · Surgery may be needed if other treatment plans do not work.  · Injections (cortisone)  Are there other health problems to treat?   If the problem is caused by an infection, inflammatory arthritis, or ankylosing spondylosis, these problems will need to be treated.  What drugs  may be needed?   The doctor may order drugs to:  · Help with pain and swelling  · Fight an infection  The doctor may give you a shot to help with pain and swelling. Talk with your doctor about possible risks with this shot.   What problems could happen?   · Long-term back pain  · Weight gain, less muscle strength and flexibility, weaker bones  · Arthritis  · Need for surgery  · Infection  What can be done to prevent this health problem?   · Stay active and work out to keep your muscles strong and flexible. Warm up slowly and stretch before you exercise.  · Use good posture.  · Use proper ways to lift and bend:  ? Spread your feet apart so you have a good base of support. Then, bend with your knees when you  something from the ground.  ? When lifting and moving an object, keep your back straight. Keep the object as close to your body as possible. Do not twist. Instead, move your feet to the direction you are going.  ? Follow your doctor's orders about weight lifting.  Last Reviewed Date   2020-10-12  Consumer Information Use and Disclaimer   This information is not specific medical advice and does not replace information you receive from your health care provider. This is only a brief summary of general information. It does NOT include all information about conditions, illnesses, injuries, tests, procedures, treatments, therapies, discharge instructions or life-style choices that may apply to you. You must talk with your health care provider for complete information about your health and treatment options. This information should not be used to decide whether or not to accept your health care providers advice, instructions or recommendations. Only your health care provider has the knowledge and training to provide advice that is right for you.  Copyright   Copyright © 2021 [employer] and its affiliates and/or licensors. All rights reserved.

## 2022-06-15 PROBLEM — K43.9 VENTRAL HERNIA WITHOUT OBSTRUCTION OR GANGRENE: Status: ACTIVE | Noted: 2022-06-15

## 2022-07-12 RX ORDER — ALPRAZOLAM 1 MG/1
1 TABLET ORAL 2 TIMES DAILY
Qty: 15 TABLET | Refills: 0 | Status: SHIPPED | OUTPATIENT
Start: 2022-07-12 | End: 2022-10-20 | Stop reason: SDUPTHER

## 2022-07-12 NOTE — TELEPHONE ENCOUNTER
Care Due:                  Date            Visit Type   Department     Provider  --------------------------------------------------------------------------------                                EP -                              PRIMARY      [facility]  Last Visit: 12-      [facility] ([facility])   MEDICINE       Pj Velasco  Next Visit: None Scheduled  None         None Found                                                            Last  Test          Frequency    Reason                     Performed    Due Date  --------------------------------------------------------------------------------    Office Visit  12 months..  atorvastatin,              12- 12-                             empagliflozin-metformin,                             valsartan................    Lipid Panel.  12 months..  atorvastatin.............  Not Found    Overdue    [employer] Embedded Care Gaps. Reference number: [id number]. 7/12/2022   3:50:37 PM CDT

## 2022-07-12 NOTE — TELEPHONE ENCOUNTER
I will send a small refill in but he is due for a follow up with me and would need a visit to get any refills after that .

## 2022-07-13 ENCOUNTER — IMMUNIZATION (OUTPATIENT)
Dept: PHARMACY | Facility: CLINIC | Age: 57
End: 2022-07-13
Payer: COMMERCIAL

## 2022-07-13 DIAGNOSIS — Z23 NEED FOR VACCINATION: Primary | ICD-10-CM

## 2022-07-13 DIAGNOSIS — I10 ESSENTIAL HYPERTENSION: Chronic | ICD-10-CM

## 2022-07-13 RX ORDER — VALSARTAN 320 MG/1
320 TABLET ORAL DAILY
Qty: 90 TABLET | Refills: 0 | Status: SHIPPED | OUTPATIENT
Start: 2022-07-13 | End: 2022-10-28 | Stop reason: SDUPTHER

## 2022-07-13 RX ORDER — VALSARTAN 320 MG/1
320 TABLET ORAL DAILY
Qty: 90 TABLET | Refills: 0 | Status: CANCELLED | OUTPATIENT
Start: 2022-07-13

## 2022-07-13 NOTE — TELEPHONE ENCOUNTER
No new care gaps identified.  [facility] Embedded Care Gaps. Reference number: [id number]. 7/13/2022   12:10:13 PM CDT

## 2022-07-13 NOTE — TELEPHONE ENCOUNTER
No new care gaps identified.  [facility] Embedded Care Gaps. Reference number: [id number]. 7/13/2022   11:57:42 AM LAYLAT

## 2022-09-21 DIAGNOSIS — E11.9 TYPE 2 DIABETES MELLITUS WITHOUT COMPLICATION: ICD-10-CM

## 2022-09-22 DIAGNOSIS — E11.9 TYPE 2 DIABETES MELLITUS WITHOUT COMPLICATION, UNSPECIFIED WHETHER LONG TERM INSULIN USE: ICD-10-CM

## 2022-09-28 DIAGNOSIS — E11.9 TYPE 2 DIABETES MELLITUS WITHOUT COMPLICATION: ICD-10-CM

## 2022-09-30 ENCOUNTER — OFFICE VISIT (OUTPATIENT)
Dept: PODIATRY | Facility: CLINIC | Age: 57
End: 2022-09-30
Payer: COMMERCIAL

## 2022-09-30 DIAGNOSIS — M20.11 VALGUS DEFORMITY OF BOTH GREAT TOES: ICD-10-CM

## 2022-09-30 DIAGNOSIS — M20.12 VALGUS DEFORMITY OF BOTH GREAT TOES: ICD-10-CM

## 2022-09-30 DIAGNOSIS — E11.9 TYPE 2 DIABETES MELLITUS WITHOUT COMPLICATION, WITHOUT LONG-TERM CURRENT USE OF INSULIN: Primary | ICD-10-CM

## 2022-09-30 DIAGNOSIS — M21.621 TAILOR'S BUNION OF BOTH FEET: ICD-10-CM

## 2022-09-30 DIAGNOSIS — L84 CORN OR CALLUS: ICD-10-CM

## 2022-09-30 DIAGNOSIS — M21.622 TAILOR'S BUNION OF BOTH FEET: ICD-10-CM

## 2022-09-30 PROCEDURE — 3072F LOW RISK FOR RETINOPATHY: CPT | Mod: CPTII,S$GLB,, | Performed by: PODIATRIST

## 2022-09-30 PROCEDURE — 3052F PR MOST RECENT HEMOGLOBIN A1C LEVEL 8.0 - < 9.0%: ICD-10-PCS | Mod: CPTII,S$GLB,, | Performed by: PODIATRIST

## 2022-09-30 PROCEDURE — 99999 PR PBB SHADOW E&M-EST. PATIENT-LVL III: CPT | Mod: PBBFAC,,, | Performed by: PODIATRIST

## 2022-09-30 PROCEDURE — 4010F ACE/ARB THERAPY RXD/TAKEN: CPT | Mod: CPTII,S$GLB,, | Performed by: PODIATRIST

## 2022-09-30 PROCEDURE — 1159F PR MEDICATION LIST DOCUMENTED IN MEDICAL RECORD: ICD-10-PCS | Mod: CPTII,S$GLB,, | Performed by: PODIATRIST

## 2022-09-30 PROCEDURE — 3072F PR LOW RISK FOR RETINOPATHY: ICD-10-PCS | Mod: CPTII,S$GLB,, | Performed by: PODIATRIST

## 2022-09-30 PROCEDURE — 4010F PR ACE/ARB THEARPY RXD/TAKEN: ICD-10-PCS | Mod: CPTII,S$GLB,, | Performed by: PODIATRIST

## 2022-09-30 PROCEDURE — 1159F MED LIST DOCD IN RCRD: CPT | Mod: CPTII,S$GLB,, | Performed by: PODIATRIST

## 2022-09-30 PROCEDURE — 99999 PR PBB SHADOW E&M-EST. PATIENT-LVL III: ICD-10-PCS | Mod: PBBFAC,,, | Performed by: PODIATRIST

## 2022-09-30 PROCEDURE — 99213 OFFICE O/P EST LOW 20 MIN: CPT | Mod: S$GLB,,, | Performed by: PODIATRIST

## 2022-09-30 PROCEDURE — 3052F HG A1C>EQUAL 8.0%<EQUAL 9.0%: CPT | Mod: CPTII,S$GLB,, | Performed by: PODIATRIST

## 2022-09-30 PROCEDURE — 99213 PR OFFICE/OUTPT VISIT, EST, LEVL III, 20-29 MIN: ICD-10-PCS | Mod: S$GLB,,, | Performed by: PODIATRIST

## 2022-09-30 NOTE — PROGRESS NOTES
Subjective:      Patient ID: Geo Lang is a 56 y.o. male.    Chief Complaint: Routine Foot Care (Pt states no pain. No concerns currently. Some issues with nail lifting or catching on his socks. DM uncontrolled her pt. Hemoglobin A1C/     Date                     Value               Ref Range           Status         /     06/15/2022               8.3 (H)             0.0 - 5.6 %         Final            /     11/22/2021               6.8 (H)             4.0 - 5.6 %         Final          /     11/01/2020               6.8 (H)             0.0 - 5.6 %         Final          /  )    Geo is a 56 y.o. male who presents to the clinic for evaluation and treatment of diabetic feet. Geo has a past medical history of Anxiety (10/02/2013), Chronic constipation, Corneal abrasion (20 yrs ago), Diabetes mellitus type I, Diverticulitis, GERD (gastroesophageal reflux disease), HTN (hypertension) (10/02/2013), Hyperlipidemia, LPRD (laryngopharyngeal reflux disease) (12/10/2013), and Sleep apnea. Patient relates no major problem with feet.  Notes the usual callus build up to the Lt. Great toe joint.  Denies this being a source of pain.  Occasionally applies moisturizer to the area.  Lastly, notes occasional neuropathy related paresthesias, but denies any recent issues.  Denies any additional pedal complaints.    PCP: Pj Velasco MD    Date Last Seen by PCP: 10/22    Hemoglobin A1C   Date Value Ref Range Status   06/15/2022 8.3 (H) 0.0 - 5.6 % Final     Comment:     Reference Interval:  5.0 - 5.6 Normal   5.7 - 6.4 High Risk   > 6.5 Diabetic      Hgb A1c results are standardized based on the (NGSP) National   Glycohemoglobin Standardization Program.      Hemoglobin A1C levels are related to mean serum/plasma glucose   during the preceding 2-3 months.        11/22/2021 6.8 (H) 4.0 - 5.6 % Final     Comment:     ADA Screening Guidelines:  5.7-6.4%  Consistent with prediabetes  >or=6.5%  Consistent with diabetes    High  levels of fetal hemoglobin interfere with the HbA1C  assay. Heterozygous hemoglobin variants (HbS, HgC, etc)do  not significantly interfere with this assay.   However, presence of multiple variants may affect accuracy.     11/01/2020 6.8 (H) 0.0 - 5.6 % Final     Comment:     Reference Interval:  5.0 - 5.6 Normal   5.7 - 6.4 High Risk   > 6.5 Diabetic    Hgb A1c results are standardized based on the (NGSP) National   Glycohemoglobin Standardization Program.    Hemoglobin A1C levels are related to mean serum/plasma glucose   during the preceding 2-3 months.                Past Medical History:   Diagnosis Date    Anxiety 10/02/2013    Chronic constipation     Corneal abrasion 20 yrs ago    [facility]    Diabetes mellitus type I     Diverticulitis     GERD (gastroesophageal reflux disease)     HTN (hypertension) 10/02/2013    Hyperlipidemia     LPRD (laryngopharyngeal reflux disease) 12/10/2013    Sleep apnea        Past Surgical History:   Procedure Laterality Date    COLONOSCOPY N/A 11/07/2015    Procedure: COLONOSCOPY;  Surgeon: Sanford Isidro MD;  Location: [address]);  Service: Endoscopy;  Laterality: N/A;    HERNIA REPAIR      LAPAROSCOPIC LYSIS OF ADHESIONS N/A 6/15/2022    Procedure: LYSIS, ADHESIONS, LAPAROSCOPIC;  Surgeon: Grant Fish MD;  Location: [facility] OR;  Service: General;  Laterality: N/A;    LAPAROSCOPIC REPAIR OF VENTRAL HERNIA N/A 6/15/2022    Procedure: REPAIR, HERNIA, VENTRAL, LAPAROSCOPIC;  Surgeon: Grant Fish MD;  Location: [facility] OR;  Service: General;  Laterality: N/A;    nissen      ROBOT-ASSISTED LAPAROSCOPIC RESECTION OF SIGMOID COLON USING DA JOHN XI N/A 11/19/2020    Procedure: XI ROBOTIC COLECTOMY, SIGMOID - ATTEMPTED CONVERTED TO OPEN;  Surgeon: Grant Fish MD;  Location: [facility] OR;  Service: General;  Laterality: N/A;  ATTEMPTED - CONVERTED TO OPEN PROCEDURE       Family History   Problem Relation Age of Onset    Hypertension Father     Hyperlipidemia Father      Heart failure Mother     Diabetes Mother     Hypertension Sister     Diabetes Sister     Hypertension Brother     Diabetes Brother     Hypertension Sister     No Known Problems Maternal Aunt     No Known Problems Maternal Uncle     No Known Problems Paternal Aunt     No Known Problems Paternal Uncle     No Known Problems Maternal Grandmother     No Known Problems Maternal Grandfather     No Known Problems Paternal Grandmother     No Known Problems Paternal Grandfather     Prostate cancer Cousin     Melanoma Neg Hx     Amblyopia Neg Hx     Blindness Neg Hx     Cancer Neg Hx     Cataracts Neg Hx     Glaucoma Neg Hx     Macular degeneration Neg Hx     Retinal detachment Neg Hx     Strabismus Neg Hx     Stroke Neg Hx     Thyroid disease Neg Hx     Colon cancer Neg Hx     Esophageal cancer Neg Hx        Social History     Socioeconomic History    Marital status: Single   Tobacco Use    Smoking status: Former     Packs/day: 1.00     Years: 15.00     Pack years: 15.00     Types: Cigarettes     Quit date: 3/15/2013     Years since quittin.5    Smokeless tobacco: Never   Substance and Sexual Activity    Alcohol use: No    Drug use: No       Current Outpatient Medications   Medication Sig Dispense Refill    acetaminophen (TYLENOL) 500 MG tablet Take 500 mg by mouth every 6 (six) hours as needed for Pain.      ALPHA LIPOIC ACID ORAL Take 1 capsule by mouth once daily.      ALPRAZolam (XANAX) 1 MG tablet Take 1 tablet (1 mg total) by mouth 2 (two) times daily. 15 tablet 0    APPLE CIDER VINEGAR ORAL Take 1 capsule by mouth once daily.      ascorbic acid, vitamin C, (VITAMIN C) 1000 MG tablet Take 1,000 mg by mouth once daily.      atenoloL (TENORMIN) 50 MG tablet TAKE 1 & 1/2 (ONE & ONE-HALF) TABLETS BY MOUTH ONCE DAILY (Patient taking differently: Take 75 mg by mouth once daily.) 135 tablet 0    atorvastatin (LIPITOR) 20 MG tablet Take 1 tablet (20 mg total) by mouth once daily. 90 tablet 3    b complex vitamins tablet  Take 1 tablet by mouth once daily.      blood sugar diagnostic Strp Use daily as directed 100 each 4    blood-glucose meter kit To check BG 2 times daily, to use with insurance preferred meter 1 each 1    chlorthalidone (HYGROTEN) 25 MG Tab Take 1/2 (one-half) tablet by mouth once daily 90 tablet 0    cholecalciferol, vitamin D3, 125 mcg (5,000 unit) capsule Take 5,000 Units by mouth once daily.      cinnamon bark 500 mg capsule Take 1,000 mg by mouth once daily.      coQ10, ubiquinol, 100 mg Cap Take 1 capsule by mouth once daily.      empagliflozin-metformin (SYNJARDY) 5-1,000 mg Tab Take 1 tablet by mouth 2 (two) times daily. 180 tablet 12    lancets Misc To check BG 2 times daily, to use with insurance preferred meter 100 each 1    magnesium oxide 400 mg magnesium Tab Take 1 tablet by mouth once daily.      ondansetron (ZOFRAN) 4 MG tablet Take 1 tablet (4 mg total) by mouth every 6 (six) hours as needed for Nausea. 20 tablet 0    valsartan (DIOVAN) 320 MG tablet Take 1 tablet by mouth once daily 90 tablet 0    zinc gluconate 100 mg Tab Take 1 tablet by mouth once daily.      oxyCODONE (ROXICODONE) 10 mg Tab immediate release tablet Take 1 tablet (10 mg total) by mouth every 6 (six) hours as needed for Pain (pain). (Patient not taking: Reported on 9/30/2022) 20 tablet 0     No current facility-administered medications for this visit.       Review of patient's allergies indicates:  No Known Allergies      Review of Systems   Constitutional: Negative for chills and fever.   Cardiovascular:  Negative for claudication and leg swelling.   Skin:  Negative for color change and nail changes.   Musculoskeletal:  Negative for joint pain, joint swelling, muscle cramps and muscle weakness.   Neurological:  Negative for numbness and paresthesias.   Psychiatric/Behavioral:  Negative for altered mental status.          Objective:      Physical Exam  Constitutional:       General: He is not in acute distress.     Appearance: He  is well-developed. He is not diaphoretic.   Cardiovascular:      Pulses:           Dorsalis pedis pulses are 2+ on the right side and 2+ on the left side.        Posterior tibial pulses are 2+ on the right side and 2+ on the left side.      Comments: CFT is < 3 seconds bilateral.  Pedal hair growth is present bilateral.  No varicosities noted bilateral.  No lower extremity edema noted bilateral.  Toes are warm to touch bilateral.    Musculoskeletal:         General: No tenderness.      Comments: Muscle strength 5/5 in all muscle groups bilateral.  No tenderness nor crepitation with ROM of foot/ankle joints bilateral.  No tenderness with palpation of bilateral foot and ankle.  Bilateral pes planus foot type.  Bilateral hallux abducto valgus.  Bilateral Tailors bunion.  Rectus alignment of remaining lesser digits bilateral.   Skin:     General: Skin is warm and dry.      Capillary Refill: Capillary refill takes less than 2 seconds.      Findings: Lesion present. No abrasion, bruising, burn, ecchymosis, laceration or petechiae.      Comments: Pedal skin has normal turgor, temperature, and texture bilateral.  Onycholysis noted to the distal 1/4 of the Rt. Hallux toenail and distal lateral edge of the Lt. Hallux toenail.  Both remain firmly adhered at the base of the nail.  Remaining toenails x 8 appear normotrophic.  Light hyperkeratotic lesions noted to the Lt. Medial hallux and 1st mtp joint.  Examination of the skin reveals no evidence of significant maceration, rashes, open lesions, suspicious appearing nevi or other concerning lesions.    Neurological:      Mental Status: He is alert and oriented to person, place, and time.      Sensory: No sensory deficit.      Comments: Protective sensation per Westley-Yakov monofilament is intact bilateral.  Vibratory sensation is intact bilateral.  Light touch is intact bilateral.             Assessment:       Encounter Diagnoses   Name Primary?    Type 2 diabetes mellitus  without complication, without long-term current use of insulin Yes    Valgus deformity of both great toes     Tailor's bunion of both feet     Corn or callus            Plan:       Geo was seen today for routine foot care.    Diagnoses and all orders for this visit:    Type 2 diabetes mellitus without complication, without long-term current use of insulin    Valgus deformity of both great toes    Tailor's bunion of both feet    Corn or callus      I counseled the patient on his conditions, their implications and medical management.    To continue with supplementation for neuropathy.    Advised to moisturize the feet with a 40% urea and 2% salicylic acid cream.    Advised to continue wearing shoe gear that accommodates digital deformities.    Shoe inspection. Diabetic Foot Education. Patient reminded of the importance of good nutrition and blood sugar control to help prevent podiatric complications of diabetes. Patient instructed on proper foot hygeine. We discussed wearing proper shoe gear, daily foot inspections, never walking without protective shoe gear, never putting sharp instruments to feet    Patient instructed to inspect his feet, wear protective shoe gear when ambulatory, moisturizer to maintain skin integrity and follow in this office in approximately 12 months, sooner p.claudio.    Ha Tobias DPM

## 2022-10-10 RX ORDER — EMPAGLIFLOZIN AND METFORMIN HYDROCHLORIDE 5; 1000 MG/1; MG/1
1 TABLET ORAL 2 TIMES DAILY
Qty: 180 TABLET | Refills: 12 | Status: SHIPPED | OUTPATIENT
Start: 2022-10-10 | End: 2023-12-27 | Stop reason: SDUPTHER

## 2022-10-10 NOTE — TELEPHONE ENCOUNTER
Care Due:                  Date            Visit Type   Department     Provider  --------------------------------------------------------------------------------                                EP -                              PRIMARY      [facility] INTERNAL  Last Visit: 12-      CARE ([facility])   MEDICINE       Pj Velasco                              MYCHART                              FOLLOWUP/OF  [facility] INTERNAL  Next Visit: 10-      FICE VISIT   MEDICINE       Pj Velasco                                                            Last  Test          Frequency    Reason                     Performed    Due Date  --------------------------------------------------------------------------------    HBA1C.......  6 months...  empagliflozin-metformin..  06-   12-    Lipid Panel.  12 months..  atorvastatin.............  Not Found    Overdue    [employer] Embedded Care Gaps. Reference number: [id number]. 10/10/2022   10:42:39 AM CDT

## 2022-10-20 ENCOUNTER — OFFICE VISIT (OUTPATIENT)
Dept: INTERNAL MEDICINE | Facility: CLINIC | Age: 57
End: 2022-10-20
Payer: COMMERCIAL

## 2022-10-20 VITALS
BODY MASS INDEX: 35.1 KG/M2 | DIASTOLIC BLOOD PRESSURE: 72 MMHG | HEIGHT: 71 IN | SYSTOLIC BLOOD PRESSURE: 118 MMHG | WEIGHT: 250.69 LBS | HEART RATE: 85 BPM | OXYGEN SATURATION: 95 %

## 2022-10-20 DIAGNOSIS — Z00.00 ROUTINE PHYSICAL EXAMINATION: Primary | ICD-10-CM

## 2022-10-20 DIAGNOSIS — E11.9 TYPE 2 DIABETES MELLITUS WITHOUT COMPLICATION, WITHOUT LONG-TERM CURRENT USE OF INSULIN: Chronic | ICD-10-CM

## 2022-10-20 DIAGNOSIS — K57.30 DIVERTICULOSIS OF LARGE INTESTINE WITHOUT HEMORRHAGE: ICD-10-CM

## 2022-10-20 DIAGNOSIS — G47.00 INSOMNIA, UNSPECIFIED TYPE: ICD-10-CM

## 2022-10-20 DIAGNOSIS — I10 ESSENTIAL HYPERTENSION: Chronic | ICD-10-CM

## 2022-10-20 DIAGNOSIS — E78.2 MIXED HYPERLIPIDEMIA: Chronic | ICD-10-CM

## 2022-10-20 DIAGNOSIS — Z12.5 SCREENING FOR PROSTATE CANCER: ICD-10-CM

## 2022-10-20 PROCEDURE — 1160F PR REVIEW ALL MEDS BY PRESCRIBER/CLIN PHARMACIST DOCUMENTED: ICD-10-PCS | Mod: CPTII,S$GLB,, | Performed by: INTERNAL MEDICINE

## 2022-10-20 PROCEDURE — 4010F ACE/ARB THERAPY RXD/TAKEN: CPT | Mod: CPTII,S$GLB,, | Performed by: INTERNAL MEDICINE

## 2022-10-20 PROCEDURE — 3052F HG A1C>EQUAL 8.0%<EQUAL 9.0%: CPT | Mod: CPTII,S$GLB,, | Performed by: INTERNAL MEDICINE

## 2022-10-20 PROCEDURE — 3078F DIAST BP <80 MM HG: CPT | Mod: CPTII,S$GLB,, | Performed by: INTERNAL MEDICINE

## 2022-10-20 PROCEDURE — 4010F PR ACE/ARB THEARPY RXD/TAKEN: ICD-10-PCS | Mod: CPTII,S$GLB,, | Performed by: INTERNAL MEDICINE

## 2022-10-20 PROCEDURE — 99999 PR PBB SHADOW E&M-EST. PATIENT-LVL IV: CPT | Mod: PBBFAC,,, | Performed by: INTERNAL MEDICINE

## 2022-10-20 PROCEDURE — 99396 PR PREVENTIVE VISIT,EST,40-64: ICD-10-PCS | Mod: S$GLB,,, | Performed by: INTERNAL MEDICINE

## 2022-10-20 PROCEDURE — 99999 PR PBB SHADOW E&M-EST. PATIENT-LVL IV: ICD-10-PCS | Mod: PBBFAC,,, | Performed by: INTERNAL MEDICINE

## 2022-10-20 PROCEDURE — 1159F PR MEDICATION LIST DOCUMENTED IN MEDICAL RECORD: ICD-10-PCS | Mod: CPTII,S$GLB,, | Performed by: INTERNAL MEDICINE

## 2022-10-20 PROCEDURE — 99396 PREV VISIT EST AGE 40-64: CPT | Mod: S$GLB,,, | Performed by: INTERNAL MEDICINE

## 2022-10-20 PROCEDURE — 1159F MED LIST DOCD IN RCRD: CPT | Mod: CPTII,S$GLB,, | Performed by: INTERNAL MEDICINE

## 2022-10-20 PROCEDURE — 3078F PR MOST RECENT DIASTOLIC BLOOD PRESSURE < 80 MM HG: ICD-10-PCS | Mod: CPTII,S$GLB,, | Performed by: INTERNAL MEDICINE

## 2022-10-20 PROCEDURE — 3074F PR MOST RECENT SYSTOLIC BLOOD PRESSURE < 130 MM HG: ICD-10-PCS | Mod: CPTII,S$GLB,, | Performed by: INTERNAL MEDICINE

## 2022-10-20 PROCEDURE — 3072F PR LOW RISK FOR RETINOPATHY: ICD-10-PCS | Mod: CPTII,S$GLB,, | Performed by: INTERNAL MEDICINE

## 2022-10-20 PROCEDURE — 1160F RVW MEDS BY RX/DR IN RCRD: CPT | Mod: CPTII,S$GLB,, | Performed by: INTERNAL MEDICINE

## 2022-10-20 PROCEDURE — 3052F PR MOST RECENT HEMOGLOBIN A1C LEVEL 8.0 - < 9.0%: ICD-10-PCS | Mod: CPTII,S$GLB,, | Performed by: INTERNAL MEDICINE

## 2022-10-20 PROCEDURE — 3072F LOW RISK FOR RETINOPATHY: CPT | Mod: CPTII,S$GLB,, | Performed by: INTERNAL MEDICINE

## 2022-10-20 PROCEDURE — 3074F SYST BP LT 130 MM HG: CPT | Mod: CPTII,S$GLB,, | Performed by: INTERNAL MEDICINE

## 2022-10-20 RX ORDER — ATORVASTATIN CALCIUM 20 MG/1
20 TABLET, FILM COATED ORAL DAILY
Qty: 90 TABLET | Refills: 3 | Status: SHIPPED | OUTPATIENT
Start: 2022-10-20 | End: 2023-11-08 | Stop reason: SDUPTHER

## 2022-10-20 RX ORDER — ALPRAZOLAM 1 MG/1
1 TABLET ORAL NIGHTLY PRN
Qty: 30 TABLET | Refills: 1 | Status: ON HOLD | OUTPATIENT
Start: 2022-10-20 | End: 2023-11-02 | Stop reason: HOSPADM

## 2022-10-20 NOTE — PROGRESS NOTES
Subjective:       Patient ID: Geo Lang is a 56 y.o. male.    Chief Complaint: Annual Exam    Patient with diabetes, anxiety, insomnia, hypertension, hyperlipidemia comes in for annual exam.  He has not seen me in 2 years.  He had diverticulitis surgery then incisional hernia surgeries.  He is doing fairly well now but is due for some labs including PSA, lipids, A1c.  He also wanted to talk about a refill of alprazolam.  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  He has previously had some minor anxiety and insomnia issues but now when he converts back and forth the periodically has a few days that it is difficult to transition.  He has tried alprazolam and it has helped and previously tried melatonin and hydroxyzine with little help so he would like to consider a refill.   refilled, he actually usually takes a half tablet from time to time so I do not think that would be a problem.  Habit-forming nature discussed.    Review of Systems   Constitutional:  Negative for chills, fatigue and fever.   HENT:  Negative for nosebleeds and trouble swallowing.    Eyes:  Negative for pain and visual disturbance.   Respiratory:  Negative for cough, shortness of breath and wheezing.    Cardiovascular:  Negative for chest pain and palpitations.   Gastrointestinal:  Negative for abdominal pain, constipation, diarrhea, nausea and vomiting.   Genitourinary:  Negative for difficulty urinating and hematuria.   Musculoskeletal:  Negative for arthralgias, back pain and neck pain.   Integumentary:  Negative for rash.   Neurological:  Negative for dizziness and headaches.   Hematological:  Does not bruise/bleed easily.   Psychiatric/Behavioral:  Positive for sleep disturbance. Negative for dysphoric mood. The patient is nervous/anxious.          Past Medical History:   Diagnosis Date    Anxiety 10/02/2013    Chronic constipation     Corneal abrasion 20 yrs ago    ou from [facility]    Diabetes mellitus  type I     Diverticulitis     GERD (gastroesophageal reflux disease)     HTN (hypertension) 10/02/2013    Hyperlipidemia     LPRD (laryngopharyngeal reflux disease) 12/10/2013    Sleep apnea      Past Surgical History:   Procedure Laterality Date    COLONOSCOPY N/A 11/07/2015    Procedure: COLONOSCOPY;  Surgeon: Sanford Isidro MD;  Location: [address]);  Service: Endoscopy;  Laterality: N/A;    HERNIA REPAIR      LAPAROSCOPIC LYSIS OF ADHESIONS N/A 6/15/2022    Procedure: LYSIS, ADHESIONS, LAPAROSCOPIC;  Surgeon: Grant Fish MD;  Location: [facility] OR;  Service: General;  Laterality: N/A;    LAPAROSCOPIC REPAIR OF VENTRAL HERNIA N/A 6/15/2022    Procedure: REPAIR, HERNIA, VENTRAL, LAPAROSCOPIC;  Surgeon: Grant Fish MD;  Location: [facility] OR;  Service: General;  Laterality: N/A;    nissen      ROBOT-ASSISTED LAPAROSCOPIC RESECTION OF SIGMOID COLON USING DA JOHN XI N/A 11/19/2020    Procedure: XI ROBOTIC COLECTOMY, SIGMOID - ATTEMPTED CONVERTED TO OPEN;  Surgeon: Grant Fish MD;  Location: [facility] OR;  Service: General;  Laterality: N/A;  ATTEMPTED - CONVERTED TO OPEN PROCEDURE      Patient Active Problem List   Diagnosis    Essential hypertension    Anxiety    LPRD (laryngopharyngeal reflux disease)    GERD (gastroesophageal reflux disease)    Obesity (BMI 30-39.9)    Pain in the chest    Type 2 diabetes mellitus without complication    HLD (hyperlipidemia)    CALVIN (obstructive sleep apnea)    Colon cancer screening    Diverticulosis of large intestine without hemorrhage    Dyspnea on exertion    Acute diverticulitis of intestine    Pneumatosis intestinalis of small intestine    Postoperative nausea    Ventral hernia without obstruction or gangrene        Objective:      Physical Exam  Constitutional:       General: He is not in acute distress.     Appearance: He is well-developed. He is obese.   HENT:      Head: Normocephalic and atraumatic.      Right Ear: Tympanic membrane, ear canal and external ear  normal.      Left Ear: Tympanic membrane, ear canal and external ear normal.      Mouth/Throat:      Pharynx: No oropharyngeal exudate or posterior oropharyngeal erythema.   Eyes:      General: No scleral icterus.     Conjunctiva/sclera: Conjunctivae normal.      Pupils: Pupils are equal, round, and reactive to light.   Neck:      Thyroid: No thyromegaly.      Comments: No supraclavicular nodes palpated  Cardiovascular:      Rate and Rhythm: Normal rate and regular rhythm.      Pulses: Normal pulses.      Heart sounds: Normal heart sounds. No murmur heard.  Pulmonary:      Effort: Pulmonary effort is normal.      Breath sounds: Normal breath sounds. No wheezing.   Abdominal:      General: Bowel sounds are normal.      Palpations: Abdomen is soft. There is no mass.      Tenderness: There is no abdominal tenderness.      Hernia: A hernia (still with mild hernia bulge over the incisional hernia repair but nontender) is present.   Musculoskeletal:         General: No tenderness.      Cervical back: Normal range of motion and neck supple.      Right lower leg: No edema.      Left lower leg: No edema.   Lymphadenopathy:      Cervical: No cervical adenopathy.   Skin:     Coloration: Skin is not jaundiced or pale.   Neurological:      General: No focal deficit present.      Mental Status: He is alert and oriented to person, place, and time.   Psychiatric:         Mood and Affect: Mood normal.         Behavior: Behavior normal.       Assessment:       Problem List Items Addressed This Visit          Cardiac/Vascular    Essential hypertension (Chronic)    HLD (hyperlipidemia) (Chronic)       Endocrine    Type 2 diabetes mellitus without complication (Chronic)    Relevant Medications    atorvastatin (LIPITOR) 20 MG tablet    Other Relevant Orders    Lipid Panel    Hemoglobin A1C    Basic Metabolic Panel       GI    Diverticulosis of large intestine without hemorrhage     Other Visit Diagnoses       Routine physical examination  "   -  Primary    Insomnia, unspecified type        Relevant Medications    ALPRAZolam (XANAX) 1 MG tablet    Screening for prostate cancer        Relevant Orders    PSA, Screening            Plan:         Geo was seen today for annual exam.    Diagnoses and all orders for this visit:    Routine physical examination    Type 2 diabetes mellitus without complication, without long-term current use of insulin  -     Lipid Panel; Future  -     Hemoglobin A1C; Future  -     Basic Metabolic Panel; Future  -     atorvastatin (LIPITOR) 20 MG tablet; Take 1 tablet (20 mg total) by mouth once daily.    Essential hypertension    Mixed hyperlipidemia    Insomnia, unspecified type  -     ALPRAZolam (XANAX) 1 MG tablet; Take 1 tablet (1 mg total) by mouth nightly as needed for Insomnia.    Screening for prostate cancer  -     PSA, Screening; Future    Diverticulosis of large intestine without hemorrhage          reviewed  Review labs.  Work on weight reduction.    Given history of Trouble at times converting from working nights to days and back, I did a refill.  30 tabs alprazolam has lasted over 60 days.   Follow-up in 4-6 months      Portions of this note may have been created with voice recognition software. Occasional "wrong-word" or "sound-a-like" substitutions may have occurred due to the inherent limitations of voice recognition software. Please, read the note carefully and recognize, using context, where substitutions have occurred.    "

## 2022-10-24 ENCOUNTER — LAB VISIT (OUTPATIENT)
Dept: LAB | Facility: HOSPITAL | Age: 57
End: 2022-10-24
Attending: INTERNAL MEDICINE
Payer: COMMERCIAL

## 2022-10-24 DIAGNOSIS — Z12.5 SCREENING FOR PROSTATE CANCER: ICD-10-CM

## 2022-10-24 DIAGNOSIS — E11.9 TYPE 2 DIABETES MELLITUS WITHOUT COMPLICATION, WITHOUT LONG-TERM CURRENT USE OF INSULIN: Chronic | ICD-10-CM

## 2022-10-24 LAB
ANION GAP SERPL CALC-SCNC: 10 MMOL/L (ref 8–16)
BUN SERPL-MCNC: 16 MG/DL (ref 6–20)
CALCIUM SERPL-MCNC: 9.7 MG/DL (ref 8.7–10.5)
CHLORIDE SERPL-SCNC: 105 MMOL/L (ref 95–110)
CHOLEST SERPL-MCNC: 131 MG/DL (ref 120–199)
CHOLEST/HDLC SERPL: 3.7 {RATIO} (ref 2–5)
CO2 SERPL-SCNC: 26 MMOL/L (ref 23–29)
COMPLEXED PSA SERPL-MCNC: 0.5 NG/ML (ref 0–4)
CREAT SERPL-MCNC: 0.9 MG/DL (ref 0.5–1.4)
EST. GFR  (NO RACE VARIABLE): >60 ML/MIN/1.73 M^2
ESTIMATED AVG GLUCOSE: 177 MG/DL (ref 68–131)
GLUCOSE SERPL-MCNC: 177 MG/DL (ref 70–110)
HBA1C MFR BLD: 7.8 % (ref 4–5.6)
HDLC SERPL-MCNC: 35 MG/DL (ref 40–75)
HDLC SERPL: 26.7 % (ref 20–50)
LDLC SERPL CALC-MCNC: 67.8 MG/DL (ref 63–159)
NONHDLC SERPL-MCNC: 96 MG/DL
POTASSIUM SERPL-SCNC: 3.5 MMOL/L (ref 3.5–5.1)
SODIUM SERPL-SCNC: 141 MMOL/L (ref 136–145)
TRIGL SERPL-MCNC: 141 MG/DL (ref 30–150)

## 2022-10-24 PROCEDURE — 83036 HEMOGLOBIN GLYCOSYLATED A1C: CPT | Performed by: INTERNAL MEDICINE

## 2022-10-24 PROCEDURE — 80048 BASIC METABOLIC PNL TOTAL CA: CPT | Performed by: INTERNAL MEDICINE

## 2022-10-24 PROCEDURE — 80061 LIPID PANEL: CPT | Performed by: INTERNAL MEDICINE

## 2022-10-24 PROCEDURE — 36415 COLL VENOUS BLD VENIPUNCTURE: CPT | Mod: PO | Performed by: INTERNAL MEDICINE

## 2022-10-24 PROCEDURE — 84153 ASSAY OF PSA TOTAL: CPT | Performed by: INTERNAL MEDICINE

## 2022-10-25 ENCOUNTER — PATIENT MESSAGE (OUTPATIENT)
Dept: INTERNAL MEDICINE | Facility: CLINIC | Age: 57
End: 2022-10-25
Payer: COMMERCIAL

## 2022-10-28 DIAGNOSIS — I10 ESSENTIAL HYPERTENSION: Chronic | ICD-10-CM

## 2022-10-28 RX ORDER — VALSARTAN 320 MG/1
320 TABLET ORAL DAILY
Qty: 90 TABLET | Refills: 3 | Status: SHIPPED | OUTPATIENT
Start: 2022-10-28 | End: 2023-08-31 | Stop reason: SDUPTHER

## 2022-10-28 NOTE — TELEPHONE ENCOUNTER
Refill Decision Note   Geo Lang  is requesting a refill authorization.  Brief Assessment and Rationale for Refill:  Approve     Medication Therapy Plan:       Medication Reconciliation Completed: No   Comments:     No Care Gaps recommended.     Note composed:5:09 PM 10/28/2022

## 2022-10-28 NOTE — TELEPHONE ENCOUNTER
No new care gaps identified.  [facility] Embedded Care Gaps. Reference number: [id number]. 10/28/2022   8:40:25 AM CDT

## 2022-11-17 ENCOUNTER — PATIENT MESSAGE (OUTPATIENT)
Dept: INTERNAL MEDICINE | Facility: CLINIC | Age: 57
End: 2022-11-17
Payer: COMMERCIAL

## 2022-11-17 DIAGNOSIS — R11.0 NAUSEA: ICD-10-CM

## 2022-11-17 DIAGNOSIS — K21.9 GASTROESOPHAGEAL REFLUX DISEASE WITHOUT ESOPHAGITIS: Primary | ICD-10-CM

## 2022-11-17 RX ORDER — ONDANSETRON 4 MG/1
4 TABLET, ORALLY DISINTEGRATING ORAL 2 TIMES DAILY
Qty: 20 TABLET | Refills: 0 | Status: SHIPPED | OUTPATIENT
Start: 2022-11-17 | End: 2023-06-28 | Stop reason: SDUPTHER

## 2022-11-17 RX ORDER — ESOMEPRAZOLE MAGNESIUM 40 MG/1
40 CAPSULE, DELAYED RELEASE ORAL
Qty: 30 CAPSULE | Refills: 11 | Status: SHIPPED | OUTPATIENT
Start: 2022-11-17 | End: 2023-11-08 | Stop reason: SDUPTHER

## 2022-11-17 NOTE — TELEPHONE ENCOUNTER
Care Due:                  Date            Visit Type   Department     Provider  --------------------------------------------------------------------------------                                MYCHART                              FOLLOWUP/OF  [facility] INTERNAL  Last Visit: 10-      FICE VISIT   MEDICINE       Pj Velasco  Next Visit: None Scheduled  None         None Found                                                            Last  Test          Frequency    Reason                     Performed    Due Date  --------------------------------------------------------------------------------    CMP.........  12 months..  atorvastatin.............  02- 02-    [facility] Embedded Care Gaps. Reference number: [id number]. 11/17/2022   12:17:21 PM CST

## 2023-01-10 ENCOUNTER — PATIENT MESSAGE (OUTPATIENT)
Dept: INTERNAL MEDICINE | Facility: CLINIC | Age: 58
End: 2023-01-10
Payer: COMMERCIAL

## 2023-01-10 RX ORDER — AMOXICILLIN AND CLAVULANATE POTASSIUM 875; 125 MG/1; MG/1
1 TABLET, FILM COATED ORAL 2 TIMES DAILY
Qty: 14 TABLET | Refills: 0 | Status: SHIPPED | OUTPATIENT
Start: 2023-01-10 | End: 2023-01-17

## 2023-04-03 ENCOUNTER — PATIENT MESSAGE (OUTPATIENT)
Dept: ADMINISTRATIVE | Facility: HOSPITAL | Age: 58
End: 2023-04-03
Payer: COMMERCIAL

## 2023-05-08 ENCOUNTER — OFFICE VISIT (OUTPATIENT)
Dept: OPTOMETRY | Facility: CLINIC | Age: 58
End: 2023-05-08
Payer: COMMERCIAL

## 2023-05-08 DIAGNOSIS — H52.13 MYOPIA WITH PRESBYOPIA OF BOTH EYES: ICD-10-CM

## 2023-05-08 DIAGNOSIS — E11.9 DIABETES MELLITUS TYPE 2 WITHOUT RETINOPATHY: Primary | ICD-10-CM

## 2023-05-08 DIAGNOSIS — H52.4 MYOPIA WITH PRESBYOPIA OF BOTH EYES: ICD-10-CM

## 2023-05-08 PROCEDURE — 92014 PR EYE EXAM, EST PATIENT,COMPREHESV: ICD-10-PCS | Mod: S$GLB,,, | Performed by: OPTOMETRIST

## 2023-05-08 PROCEDURE — 2023F DILAT RTA XM W/O RTNOPTHY: CPT | Mod: CPTII,S$GLB,, | Performed by: OPTOMETRIST

## 2023-05-08 PROCEDURE — 99999 PR PBB SHADOW E&M-EST. PATIENT-LVL III: ICD-10-PCS | Mod: PBBFAC,,, | Performed by: OPTOMETRIST

## 2023-05-08 PROCEDURE — 4010F ACE/ARB THERAPY RXD/TAKEN: CPT | Mod: CPTII,S$GLB,, | Performed by: OPTOMETRIST

## 2023-05-08 PROCEDURE — 4010F PR ACE/ARB THEARPY RXD/TAKEN: ICD-10-PCS | Mod: CPTII,S$GLB,, | Performed by: OPTOMETRIST

## 2023-05-08 PROCEDURE — 92015 PR REFRACTION: ICD-10-PCS | Mod: S$GLB,,, | Performed by: OPTOMETRIST

## 2023-05-08 PROCEDURE — 1159F MED LIST DOCD IN RCRD: CPT | Mod: CPTII,S$GLB,, | Performed by: OPTOMETRIST

## 2023-05-08 PROCEDURE — 99999 PR PBB SHADOW E&M-EST. PATIENT-LVL III: CPT | Mod: PBBFAC,,, | Performed by: OPTOMETRIST

## 2023-05-08 PROCEDURE — 92014 COMPRE OPH EXAM EST PT 1/>: CPT | Mod: S$GLB,,, | Performed by: OPTOMETRIST

## 2023-05-08 PROCEDURE — 92015 DETERMINE REFRACTIVE STATE: CPT | Mod: S$GLB,,, | Performed by: OPTOMETRIST

## 2023-05-08 PROCEDURE — 2023F PR DILATED RETINAL EXAM W/O EVID OF RETINOPATHY: ICD-10-PCS | Mod: CPTII,S$GLB,, | Performed by: OPTOMETRIST

## 2023-05-08 PROCEDURE — 1159F PR MEDICATION LIST DOCUMENTED IN MEDICAL RECORD: ICD-10-PCS | Mod: CPTII,S$GLB,, | Performed by: OPTOMETRIST

## 2023-05-08 NOTE — PROGRESS NOTES
HPI    Last eye exam was 2/22/21 with Dr. Austin.  Patient states vision is the same but didn't update glasses after last   exam. Wanted to get an updated glasses rx today.  Patient denies diplopia, headaches, flashes/floaters, and pain.    Hemoglobin A1C       Date                     Value               Ref Range             Status                10/24/2022               7.8 (H)             4.0 - 5.6 %           Final                Last edited by Zeynep Dalton MA on 5/8/2023  9:20 AM.            Assessment /Plan     For exam results, see Encounter Report.    Diabetes mellitus type 2 without retinopathy  -No retinopathy noted today.  Continued control with primary care physician and annual comprehensive eye exam.    Myopia with presbyopia of both eyes  Eyeglass Final Rx       Eyeglass Final Rx         Sphere Cylinder Dist VA Add    Right -3.50 Sphere 20/20-1 +2.25    Left -3.75 Sphere 20/20-2 +2.25      Expiration Date: 5/8/2024                      RTC 1 yr

## 2023-05-10 DIAGNOSIS — E11.9 TYPE 2 DIABETES MELLITUS WITHOUT COMPLICATION: ICD-10-CM

## 2023-05-16 ENCOUNTER — PATIENT MESSAGE (OUTPATIENT)
Dept: ADMINISTRATIVE | Facility: HOSPITAL | Age: 58
End: 2023-05-16
Payer: COMMERCIAL

## 2023-06-28 ENCOUNTER — IMMUNIZATION (OUTPATIENT)
Dept: PHARMACY | Facility: CLINIC | Age: 58
End: 2023-06-28
Payer: COMMERCIAL

## 2023-06-28 ENCOUNTER — LAB VISIT (OUTPATIENT)
Dept: LAB | Facility: HOSPITAL | Age: 58
End: 2023-06-28
Attending: INTERNAL MEDICINE
Payer: COMMERCIAL

## 2023-06-28 ENCOUNTER — PATIENT OUTREACH (OUTPATIENT)
Dept: ADMINISTRATIVE | Facility: HOSPITAL | Age: 58
End: 2023-06-28
Payer: COMMERCIAL

## 2023-06-28 ENCOUNTER — OFFICE VISIT (OUTPATIENT)
Dept: INTERNAL MEDICINE | Facility: CLINIC | Age: 58
End: 2023-06-28
Payer: COMMERCIAL

## 2023-06-28 VITALS
SYSTOLIC BLOOD PRESSURE: 118 MMHG | OXYGEN SATURATION: 96 % | DIASTOLIC BLOOD PRESSURE: 74 MMHG | BODY MASS INDEX: 35.22 KG/M2 | WEIGHT: 251.56 LBS | HEIGHT: 71 IN | HEART RATE: 73 BPM

## 2023-06-28 DIAGNOSIS — I10 ESSENTIAL HYPERTENSION: Chronic | ICD-10-CM

## 2023-06-28 DIAGNOSIS — K43.9 VENTRAL HERNIA WITHOUT OBSTRUCTION OR GANGRENE: ICD-10-CM

## 2023-06-28 DIAGNOSIS — E11.9 TYPE 2 DIABETES MELLITUS WITHOUT COMPLICATION, WITHOUT LONG-TERM CURRENT USE OF INSULIN: Primary | ICD-10-CM

## 2023-06-28 DIAGNOSIS — K57.30 DIVERTICULOSIS OF LARGE INTESTINE WITHOUT HEMORRHAGE: ICD-10-CM

## 2023-06-28 DIAGNOSIS — E11.9 TYPE 2 DIABETES MELLITUS WITHOUT COMPLICATION: ICD-10-CM

## 2023-06-28 DIAGNOSIS — E11.9 TYPE 2 DIABETES MELLITUS WITHOUT COMPLICATION, WITHOUT LONG-TERM CURRENT USE OF INSULIN: ICD-10-CM

## 2023-06-28 DIAGNOSIS — R11.0 NAUSEA: ICD-10-CM

## 2023-06-28 DIAGNOSIS — K21.9 GASTROESOPHAGEAL REFLUX DISEASE WITHOUT ESOPHAGITIS: ICD-10-CM

## 2023-06-28 DIAGNOSIS — Z23 NEED FOR VACCINATION: Primary | ICD-10-CM

## 2023-06-28 LAB
ESTIMATED AVG GLUCOSE: 237 MG/DL (ref 68–131)
HBA1C MFR BLD: 9.9 % (ref 4–5.6)

## 2023-06-28 PROCEDURE — 1160F RVW MEDS BY RX/DR IN RCRD: CPT | Mod: CPTII,S$GLB,, | Performed by: INTERNAL MEDICINE

## 2023-06-28 PROCEDURE — 3008F PR BODY MASS INDEX (BMI) DOCUMENTED: ICD-10-PCS | Mod: CPTII,S$GLB,, | Performed by: INTERNAL MEDICINE

## 2023-06-28 PROCEDURE — 4010F PR ACE/ARB THEARPY RXD/TAKEN: ICD-10-PCS | Mod: CPTII,S$GLB,, | Performed by: INTERNAL MEDICINE

## 2023-06-28 PROCEDURE — 99214 PR OFFICE/OUTPT VISIT, EST, LEVL IV, 30-39 MIN: ICD-10-PCS | Mod: S$GLB,,, | Performed by: INTERNAL MEDICINE

## 2023-06-28 PROCEDURE — 1159F MED LIST DOCD IN RCRD: CPT | Mod: CPTII,S$GLB,, | Performed by: INTERNAL MEDICINE

## 2023-06-28 PROCEDURE — 3074F PR MOST RECENT SYSTOLIC BLOOD PRESSURE < 130 MM HG: ICD-10-PCS | Mod: CPTII,S$GLB,, | Performed by: INTERNAL MEDICINE

## 2023-06-28 PROCEDURE — 3074F SYST BP LT 130 MM HG: CPT | Mod: CPTII,S$GLB,, | Performed by: INTERNAL MEDICINE

## 2023-06-28 PROCEDURE — 4010F ACE/ARB THERAPY RXD/TAKEN: CPT | Mod: CPTII,S$GLB,, | Performed by: INTERNAL MEDICINE

## 2023-06-28 PROCEDURE — 99999 PR PBB SHADOW E&M-EST. PATIENT-LVL V: ICD-10-PCS | Mod: PBBFAC,,, | Performed by: INTERNAL MEDICINE

## 2023-06-28 PROCEDURE — 83036 HEMOGLOBIN GLYCOSYLATED A1C: CPT | Performed by: INTERNAL MEDICINE

## 2023-06-28 PROCEDURE — 3078F DIAST BP <80 MM HG: CPT | Mod: CPTII,S$GLB,, | Performed by: INTERNAL MEDICINE

## 2023-06-28 PROCEDURE — 1160F PR REVIEW ALL MEDS BY PRESCRIBER/CLIN PHARMACIST DOCUMENTED: ICD-10-PCS | Mod: CPTII,S$GLB,, | Performed by: INTERNAL MEDICINE

## 2023-06-28 PROCEDURE — 1159F PR MEDICATION LIST DOCUMENTED IN MEDICAL RECORD: ICD-10-PCS | Mod: CPTII,S$GLB,, | Performed by: INTERNAL MEDICINE

## 2023-06-28 PROCEDURE — 3008F BODY MASS INDEX DOCD: CPT | Mod: CPTII,S$GLB,, | Performed by: INTERNAL MEDICINE

## 2023-06-28 PROCEDURE — 3078F PR MOST RECENT DIASTOLIC BLOOD PRESSURE < 80 MM HG: ICD-10-PCS | Mod: CPTII,S$GLB,, | Performed by: INTERNAL MEDICINE

## 2023-06-28 PROCEDURE — 99214 OFFICE O/P EST MOD 30 MIN: CPT | Mod: S$GLB,,, | Performed by: INTERNAL MEDICINE

## 2023-06-28 PROCEDURE — 99999 PR PBB SHADOW E&M-EST. PATIENT-LVL V: CPT | Mod: PBBFAC,,, | Performed by: INTERNAL MEDICINE

## 2023-06-28 PROCEDURE — 36415 COLL VENOUS BLD VENIPUNCTURE: CPT | Performed by: INTERNAL MEDICINE

## 2023-06-28 RX ORDER — ONDANSETRON 4 MG/1
4 TABLET, ORALLY DISINTEGRATING ORAL 2 TIMES DAILY
Qty: 20 TABLET | Refills: 0 | Status: SHIPPED | OUTPATIENT
Start: 2023-06-28 | End: 2023-06-28 | Stop reason: SDUPTHER

## 2023-06-28 RX ORDER — ONDANSETRON 8 MG/1
8 TABLET, ORALLY DISINTEGRATING ORAL 2 TIMES DAILY
Qty: 30 TABLET | Refills: 1 | Status: ON HOLD | OUTPATIENT
Start: 2023-06-28 | End: 2023-10-18 | Stop reason: SDUPTHER

## 2023-06-28 NOTE — PROGRESS NOTES
Subjective:       Patient ID: Geo Lang is a 57 y.o. male.    Chief Complaint: Annual Exam    Patient in for interval follow-up of diabetes.  He is due for an A1c.  He is having a follow-up with his surgeon because his ventral hernia he believes has gotten larger and sometimes gives him some discomfort.  Primarily with straining.  No tenderness usually and if he has a binder in place no pain but the follow-up is arranged.    He periodically has some nausea and would like Zofran refilled.  Will assist Him with that.    Review of Systems   Constitutional:  Negative for chills, fatigue and fever.   HENT:  Negative for nosebleeds and trouble swallowing.    Eyes:  Negative for pain and visual disturbance.   Respiratory:  Negative for cough, shortness of breath and wheezing.    Cardiovascular:  Negative for chest pain and palpitations.   Gastrointestinal:  Positive for abdominal distention. Negative for abdominal pain, constipation, diarrhea, nausea and vomiting.   Genitourinary:  Negative for difficulty urinating and hematuria.   Musculoskeletal:  Negative for arthralgias, back pain and neck pain.   Integumentary:  Negative for rash.   Neurological:  Negative for dizziness and headaches.   Hematological:  Does not bruise/bleed easily.   Psychiatric/Behavioral:  Negative for dysphoric mood and sleep disturbance.          Past Medical History:   Diagnosis Date    Anxiety 10/02/2013    Chronic constipation     Corneal abrasion 20 yrs ago    ou from [facility]    Diabetes mellitus type I     Diverticulitis     GERD (gastroesophageal reflux disease)     HTN (hypertension) 10/02/2013    Hyperlipidemia     LPRD (laryngopharyngeal reflux disease) 12/10/2013    Sleep apnea      Past Surgical History:   Procedure Laterality Date    COLONOSCOPY N/A 11/07/2015    Procedure: COLONOSCOPY;  Surgeon: Sanford Isidro MD;  Location: [address]);  Service: Endoscopy;  Laterality: N/A;    HERNIA REPAIR      LAPAROSCOPIC LYSIS OF  ADHESIONS N/A 6/15/2022    Procedure: LYSIS, ADHESIONS, LAPAROSCOPIC;  Surgeon: Grant Fish MD;  Location: [facility] OR;  Service: General;  Laterality: N/A;    LAPAROSCOPIC REPAIR OF VENTRAL HERNIA N/A 6/15/2022    Procedure: REPAIR, HERNIA, VENTRAL, LAPAROSCOPIC;  Surgeon: Grant Fish MD;  Location: [facility] OR;  Service: General;  Laterality: N/A;    nissen      ROBOT-ASSISTED LAPAROSCOPIC RESECTION OF SIGMOID COLON USING DA JOHN XI N/A 11/19/2020    Procedure: XI ROBOTIC COLECTOMY, SIGMOID - ATTEMPTED CONVERTED TO OPEN;  Surgeon: Grant Fish MD;  Location: [facility] OR;  Service: General;  Laterality: N/A;  ATTEMPTED - CONVERTED TO OPEN PROCEDURE      Patient Active Problem List   Diagnosis    Essential hypertension    Anxiety    LPRD (laryngopharyngeal reflux disease)    GERD (gastroesophageal reflux disease)    Obesity (BMI 30-39.9)    Pain in the chest    Type 2 diabetes mellitus without complication    HLD (hyperlipidemia)    CALVIN (obstructive sleep apnea)    Colon cancer screening    Diverticulosis of large intestine without hemorrhage    Dyspnea on exertion    Acute diverticulitis of intestine    Pneumatosis intestinalis of small intestine    Postoperative nausea    Ventral hernia without obstruction or gangrene        Objective:      Physical Exam  Constitutional:       General: He is not in acute distress.     Appearance: He is well-developed. He is obese.   HENT:      Head: Normocephalic and atraumatic.      Right Ear: Tympanic membrane, ear canal and external ear normal.      Left Ear: Tympanic membrane, ear canal and external ear normal.      Mouth/Throat:      Pharynx: No oropharyngeal exudate or posterior oropharyngeal erythema.   Eyes:      General: No scleral icterus.     Conjunctiva/sclera: Conjunctivae normal.      Pupils: Pupils are equal, round, and reactive to light.   Neck:      Thyroid: No thyromegaly.      Comments: No supraclavicular nodes palpated  Cardiovascular:      Rate and Rhythm: Normal  rate and regular rhythm.      Pulses: Normal pulses.      Heart sounds: Normal heart sounds. No murmur heard.  Pulmonary:      Effort: Pulmonary effort is normal.      Breath sounds: Normal breath sounds. No wheezing.   Abdominal:      General: Bowel sounds are normal.      Palpations: Abdomen is soft. There is no mass.      Tenderness: There is no abdominal tenderness.      Hernia: A hernia (ventral) is present.   Musculoskeletal:         General: No tenderness.      Cervical back: Normal range of motion and neck supple.      Right lower leg: No edema.      Left lower leg: No edema.   Lymphadenopathy:      Cervical: No cervical adenopathy.   Skin:     Coloration: Skin is not jaundiced or pale.   Neurological:      General: No focal deficit present.      Mental Status: He is alert and oriented to person, place, and time.   Psychiatric:         Mood and Affect: Mood normal.         Behavior: Behavior normal.       Assessment:       Problem List Items Addressed This Visit          Cardiac/Vascular    Essential hypertension (Chronic)       Endocrine    Type 2 diabetes mellitus without complication - Primary (Chronic)    Relevant Orders    Hemoglobin A1C       GI    GERD (gastroesophageal reflux disease)    Relevant Medications    ondansetron (ZOFRAN-ODT) 4 MG TbDL    Diverticulosis of large intestine without hemorrhage    Ventral hernia without obstruction or gangrene     Other Visit Diagnoses       Nausea        Relevant Medications    ondansetron (ZOFRAN-ODT) 4 MG TbDL            Plan:         Geo was seen today for annual exam.    Diagnoses and all orders for this visit:    Type 2 diabetes mellitus without complication, without long-term current use of insulin  -     Hemoglobin A1C; Future    Gastroesophageal reflux disease without esophagitis  -     ondansetron (ZOFRAN-ODT) 4 MG TbDL; Take 1 tablet (4 mg total) by mouth 2 (two) times daily.    Nausea  -     ondansetron (ZOFRAN-ODT) 4 MG TbDL; Take 1 tablet (4 mg  "total) by mouth 2 (two) times daily.    Diverticulosis of large intestine without hemorrhage    Essential hypertension    Ventral hernia without obstruction or gangrene           Review labs, follow-up in 6 months sooner p.r.n.          Portions of this note may have been created with voice recognition software. Occasional "wrong-word" or "sound-a-like" substitutions may have occurred due to the inherent limitations of voice recognition software. Please, read the note carefully and recognize, using context, where substitutions have occurred.  "

## 2023-06-29 ENCOUNTER — PATIENT MESSAGE (OUTPATIENT)
Dept: INTERNAL MEDICINE | Facility: CLINIC | Age: 58
End: 2023-06-29
Payer: COMMERCIAL

## 2023-07-05 DIAGNOSIS — E11.9 TYPE 2 DIABETES MELLITUS WITHOUT COMPLICATION: ICD-10-CM

## 2023-07-08 DIAGNOSIS — I10 ESSENTIAL HYPERTENSION: Chronic | ICD-10-CM

## 2023-07-10 RX ORDER — CHLORTHALIDONE 25 MG/1
TABLET ORAL
Qty: 45 TABLET | Refills: 0 | Status: SHIPPED | OUTPATIENT
Start: 2023-07-10 | End: 2023-09-30

## 2023-07-10 NOTE — TELEPHONE ENCOUNTER
No care due was identified.  [facility] Embedded Care Due Messages. Reference number: [id number].   7/10/2023 7:08:00 AM CDT

## 2023-07-10 NOTE — TELEPHONE ENCOUNTER
Refill Routing Note   Medication(s) are not appropriate for processing by [facility] for the following reason(s):      No active prescription written by PCP    [facility] action(s):  Defer Care Due:  None identified          Appointments  past 12m or future 3m with PCP    Date Provider   Last Visit   6/28/2023 Pj Velasco MD   Next Visit   Visit date not found Pj Velasco MD   ED visits in past 90 days: 0        Note composed:7:07 AM 07/10/2023

## 2023-07-11 ENCOUNTER — PATIENT MESSAGE (OUTPATIENT)
Dept: ADMINISTRATIVE | Facility: HOSPITAL | Age: 58
End: 2023-07-11
Payer: COMMERCIAL

## 2023-07-12 ENCOUNTER — PATIENT MESSAGE (OUTPATIENT)
Dept: INTERNAL MEDICINE | Facility: CLINIC | Age: 58
End: 2023-07-12
Payer: COMMERCIAL

## 2023-07-13 RX ORDER — INSULIN PUMP SYRINGE, 3 ML
EACH MISCELLANEOUS
Qty: 1 EACH | Refills: 1 | Status: SHIPPED | OUTPATIENT
Start: 2023-07-13 | End: 2024-07-12

## 2023-07-13 NOTE — TELEPHONE ENCOUNTER
No care due was identified.  [facility] Embedded Care Due Messages. Reference number: [id number].   7/13/2023 11:22:03 AM CDT

## 2023-07-17 ENCOUNTER — PATIENT MESSAGE (OUTPATIENT)
Dept: INTERNAL MEDICINE | Facility: CLINIC | Age: 58
End: 2023-07-17
Payer: COMMERCIAL

## 2023-08-31 DIAGNOSIS — I10 ESSENTIAL HYPERTENSION: Chronic | ICD-10-CM

## 2023-08-31 RX ORDER — VALSARTAN 320 MG/1
320 TABLET ORAL DAILY
Qty: 90 TABLET | Refills: 3 | Status: SHIPPED | OUTPATIENT
Start: 2023-08-31

## 2023-08-31 RX ORDER — VALSARTAN 320 MG/1
320 TABLET ORAL DAILY
Qty: 90 TABLET | Refills: 3 | Status: CANCELLED | OUTPATIENT
Start: 2023-08-31

## 2023-08-31 NOTE — TELEPHONE ENCOUNTER
Care Due:                  Date            Visit Type   Department     Provider  --------------------------------------------------------------------------------                                MYCHART                              FOLLOWUP/OF  [facility] INTERNAL  Last Visit: 06-      FICE VISIT   MEDICINE       Pj Velasco  Next Visit: None Scheduled  None         None Found                                                            Last  Test          Frequency    Reason                     Performed    Due Date  --------------------------------------------------------------------------------    CMP.........  12 months..  atorvastatin,              02- 02-                             chlorthalidone,                             empagliflozin-metformin,                             valsartan................    Lipid Panel.  12 months..  atorvastatin.............  10-   10-    [facility] Embedded Care Due Messages. Reference number: [id number].   8/31/2023 12:26:36 PM CDT

## 2023-09-30 DIAGNOSIS — I10 ESSENTIAL HYPERTENSION: Chronic | ICD-10-CM

## 2023-09-30 RX ORDER — CHLORTHALIDONE 25 MG/1
TABLET ORAL
Qty: 45 TABLET | Refills: 0 | Status: SHIPPED | OUTPATIENT
Start: 2023-09-30 | End: 2023-11-29

## 2023-09-30 NOTE — TELEPHONE ENCOUNTER
Provider Staff:  Action required for this patient     Please see care gap opportunities below in Care Due Message: Lab (a1c) due 12.26.2023.     Thanks!  [facility]     Appointments     Last Visit   6/28/2023 Pj Velasco MD   Next Visit   Visit date not found Pj Velasco MD       Refill Decision Note   Geo Lang  is requesting a refill authorization.  Brief Assessment and Rationale for Refill:  Approve       Medication Therapy Plan:  Lab (a1c) due 12.26.2023      Comments:   Note composed:3:01 PM 09/30/2023

## 2023-09-30 NOTE — TELEPHONE ENCOUNTER
Care Due:                  Date            Visit Type   Department     Provider  --------------------------------------------------------------------------------                                MYCHART                              FOLLOWUP/OF  [facility] INTERNAL  Last Visit: 06-      FICE VISIT   MEDICINE       Pj Velasco  Next Visit: None Scheduled  None         None Found                                                            Last  Test          Frequency    Reason                     Performed    Due Date  --------------------------------------------------------------------------------    HBA1C.......  6 months...  empagliflozin-metformin..  06- 12-    [facility] Embedded Care Due Messages. Reference number: [id number].   9/30/2023 11:08:25 AM CDT

## 2023-10-11 DIAGNOSIS — E11.9 TYPE 2 DIABETES MELLITUS WITHOUT COMPLICATION: ICD-10-CM

## 2023-10-17 ENCOUNTER — PATIENT MESSAGE (OUTPATIENT)
Dept: ADMINISTRATIVE | Facility: HOSPITAL | Age: 58
End: 2023-10-17
Payer: COMMERCIAL

## 2023-10-18 PROBLEM — K43.6 VENTRAL HERNIA WITH OBSTRUCTION AND WITHOUT GANGRENE: Status: ACTIVE | Noted: 2022-06-15

## 2023-10-19 ENCOUNTER — TELEPHONE (OUTPATIENT)
Dept: INTERNAL MEDICINE | Facility: CLINIC | Age: 58
End: 2023-10-19
Payer: COMMERCIAL

## 2023-10-19 NOTE — TELEPHONE ENCOUNTER
----- Message from Renita Gonzalez sent at 10/18/2023  2:05 PM CDT -----  Contact: Pt [phone]  Caller is requesting an earlier appointment then we can schedule.  Caller is requesting a message be sent to the provider.  :    When is the next available appointment with their provider:  11/3/23  Reason for the appointment:  Hospital Follow up

## 2023-10-23 ENCOUNTER — TELEPHONE (OUTPATIENT)
Dept: INTERNAL MEDICINE | Facility: CLINIC | Age: 58
End: 2023-10-23
Payer: COMMERCIAL

## 2023-10-23 NOTE — TELEPHONE ENCOUNTER
----- Message from Tamara Isidro sent at 10/23/2023  1:02 PM CDT -----  Contact: Pt [phone]  Pt called in regards to getting a wellness visit scheduled with NP please advise

## 2023-10-24 ENCOUNTER — TELEPHONE (OUTPATIENT)
Dept: INTERNAL MEDICINE | Facility: CLINIC | Age: 58
End: 2023-10-24
Payer: COMMERCIAL

## 2023-10-24 ENCOUNTER — OFFICE VISIT (OUTPATIENT)
Dept: INTERNAL MEDICINE | Facility: CLINIC | Age: 58
End: 2023-10-24
Payer: COMMERCIAL

## 2023-10-24 DIAGNOSIS — G47.33 OSA (OBSTRUCTIVE SLEEP APNEA): Chronic | ICD-10-CM

## 2023-10-24 DIAGNOSIS — E11.9 TYPE 2 DIABETES MELLITUS WITHOUT COMPLICATION, WITHOUT LONG-TERM CURRENT USE OF INSULIN: Chronic | ICD-10-CM

## 2023-10-24 DIAGNOSIS — K21.9 GASTROESOPHAGEAL REFLUX DISEASE WITHOUT ESOPHAGITIS: ICD-10-CM

## 2023-10-24 DIAGNOSIS — K43.6 VENTRAL HERNIA WITH OBSTRUCTION AND WITHOUT GANGRENE: Primary | ICD-10-CM

## 2023-10-24 PROCEDURE — 3051F PR MOST RECENT HEMOGLOBIN A1C LEVEL 7.0 - < 8.0%: ICD-10-PCS | Mod: CPTII,95,, | Performed by: INTERNAL MEDICINE

## 2023-10-24 PROCEDURE — 1160F RVW MEDS BY RX/DR IN RCRD: CPT | Mod: CPTII,95,, | Performed by: INTERNAL MEDICINE

## 2023-10-24 PROCEDURE — 1159F MED LIST DOCD IN RCRD: CPT | Mod: CPTII,95,, | Performed by: INTERNAL MEDICINE

## 2023-10-24 PROCEDURE — 1160F PR REVIEW ALL MEDS BY PRESCRIBER/CLIN PHARMACIST DOCUMENTED: ICD-10-PCS | Mod: CPTII,95,, | Performed by: INTERNAL MEDICINE

## 2023-10-24 PROCEDURE — 4010F PR ACE/ARB THEARPY RXD/TAKEN: ICD-10-PCS | Mod: CPTII,95,, | Performed by: INTERNAL MEDICINE

## 2023-10-24 PROCEDURE — 1159F PR MEDICATION LIST DOCUMENTED IN MEDICAL RECORD: ICD-10-PCS | Mod: CPTII,95,, | Performed by: INTERNAL MEDICINE

## 2023-10-24 PROCEDURE — 99214 PR OFFICE/OUTPT VISIT, EST, LEVL IV, 30-39 MIN: ICD-10-PCS | Mod: 95,,, | Performed by: INTERNAL MEDICINE

## 2023-10-24 PROCEDURE — 3051F HG A1C>EQUAL 7.0%<8.0%: CPT | Mod: CPTII,95,, | Performed by: INTERNAL MEDICINE

## 2023-10-24 PROCEDURE — 4010F ACE/ARB THERAPY RXD/TAKEN: CPT | Mod: CPTII,95,, | Performed by: INTERNAL MEDICINE

## 2023-10-24 PROCEDURE — 99214 OFFICE O/P EST MOD 30 MIN: CPT | Mod: 95,,, | Performed by: INTERNAL MEDICINE

## 2023-10-24 NOTE — PROGRESS NOTES
Subjective:       Patient ID: Geo Lang is a 57 y.o. male.    Chief Complaint: Follow-up      The patient location is: [place]  The chief complaint leading to consultation is: Hernia2    Visit type: audiovisual    Face to Face time with patient: 18 minutes  22 minutes of total time spent on the encounter, which includes face to face time and non-face to face time preparing to see the patient (eg, review of tests), Obtaining and/or reviewing separately obtained history, Documenting clinical information in the electronic or other health record, Independently interpreting results (not separately reported) and communicating results to the patient/family/caregiver, or Care coordination (not separately reported).         Each patient to whom he or she provides medical services by telemedicine is:  (1) informed of the relationship between the physician and patient and the respective role of any other health care provider with respect to management of the patient; and (2) notified that he or she may decline to receive medical services by telemedicine and may withdraw from such care at any time.    Notes:     HPI: He is doing well after hospital stay for issue with his hernia. It became incarcerated and he was hospitalized. Getting the repair done Monday 10/30.     Having surgery on Monday. Between now and then, he is to avoid aspirin, aleve, advil, motrin etc. Only is Tylenol.   Do not take Synjardy the AM of surgery and hold this med 2 days after surgery due to the Metformin.   Do not strain or overeat between now and the surgery.   OK to continue the abdominal binder prior to surgery.     EKG and CBC and BMP labs reviewed and stable.     Follow-up  Pertinent negatives include no arthralgias, chest pain, headaches, joint swelling, neck pain, vomiting or weakness.     Review of Systems   Constitutional:  Negative for activity change and unexpected weight change.   HENT:  Negative for hearing loss, rhinorrhea and trouble  "swallowing.    Eyes:  Negative for discharge and visual disturbance.   Respiratory:  Negative for chest tightness and wheezing.    Cardiovascular:  Negative for chest pain and palpitations.   Gastrointestinal:  Negative for blood in stool, constipation, diarrhea and vomiting.   Endocrine: Negative for polydipsia and polyuria.   Genitourinary:  Negative for difficulty urinating, hematuria and urgency.   Musculoskeletal:  Negative for arthralgias, joint swelling and neck pain.   Neurological:  Negative for weakness and headaches.   Psychiatric/Behavioral:  Negative for confusion and dysphoric mood.          Objective:      Physical Exam  Constitutional:       Appearance: Normal appearance. He is obese.   Neurological:      General: No focal deficit present.      Mental Status: He is alert.   Psychiatric:         Mood and Affect: Mood normal.         Thought Content: Thought content normal.         Assessment:       Problem List Items Addressed This Visit          Endocrine    Type 2 diabetes mellitus without complication (Chronic)       GI    GERD (gastroesophageal reflux disease)    Ventral hernia with obstruction and without gangrene - Primary       Other    CALVIN (obstructive sleep apnea) (Chronic)       Plan:       Geo was seen today for follow-up.    Diagnoses and all orders for this visit:    Ventral hernia with obstruction and without gangrene    Type 2 diabetes mellitus without complication, without long-term current use of insulin    CALVIN (obstructive sleep apnea)    Gastroesophageal reflux disease without esophagitis           Perioperative instructions given including holding metformin the morning of and for 2 days after surgery.  Otherwise patient seems stable and optimized for ventral hernia repair surgery        Portions of this note may have been created with voice recognition software. Occasional "wrong-word" or "sound-a-like" substitutions may have occurred due to the inherent limitations of voice " recognition software. Please, read the note carefully and recognize, using context, where substitutions have occurred.

## 2023-10-24 NOTE — TELEPHONE ENCOUNTER
----- Message from Gayle Hinojosa sent at 10/24/2023  3:22 PM CDT -----  Contact: [phone]  [email or web address]     Patient is calling for Medical Advice regarding:pt is calling he forgot about his virtual appt for today     How long has patient had these symptoms:    Pharmacy name and phone#:    Would like response via Insight[employer]:  no     Comments:pt states he is very sorry he has been busy doing stuff all day and is asking if there is anyway he can get a virtual at some point in time this evening or what can he do since he missed the appt please give return call

## 2023-10-24 NOTE — TELEPHONE ENCOUNTER
----- Message from Gayle Hinojosa sent at 10/24/2023  3:32 PM CDT -----  Contact: [phone]  Pt is trying to get through the E-pre check in due to his service he is trying to connect where he is so he can start his visit at 3:45

## 2023-10-30 PROBLEM — K43.2 VENTRAL HERNIA, RECURRENT: Status: ACTIVE | Noted: 2023-10-30

## 2023-11-01 DIAGNOSIS — E11.9 TYPE 2 DIABETES MELLITUS WITHOUT COMPLICATION: ICD-10-CM

## 2023-11-08 ENCOUNTER — OFFICE VISIT (OUTPATIENT)
Dept: INTERNAL MEDICINE | Facility: CLINIC | Age: 58
End: 2023-11-08
Payer: COMMERCIAL

## 2023-11-08 VITALS
SYSTOLIC BLOOD PRESSURE: 124 MMHG | HEART RATE: 77 BPM | WEIGHT: 240.31 LBS | BODY MASS INDEX: 33.64 KG/M2 | HEIGHT: 71 IN | DIASTOLIC BLOOD PRESSURE: 80 MMHG | OXYGEN SATURATION: 98 %

## 2023-11-08 DIAGNOSIS — G47.00 INSOMNIA, UNSPECIFIED TYPE: ICD-10-CM

## 2023-11-08 DIAGNOSIS — E11.9 TYPE 2 DIABETES MELLITUS WITHOUT COMPLICATION, WITHOUT LONG-TERM CURRENT USE OF INSULIN: Chronic | ICD-10-CM

## 2023-11-08 DIAGNOSIS — Z00.00 ANNUAL PHYSICAL EXAM: Primary | ICD-10-CM

## 2023-11-08 DIAGNOSIS — K21.9 GASTROESOPHAGEAL REFLUX DISEASE WITHOUT ESOPHAGITIS: ICD-10-CM

## 2023-11-08 DIAGNOSIS — I10 ESSENTIAL HYPERTENSION: Chronic | ICD-10-CM

## 2023-11-08 LAB
ALBUMIN/CREAT UR: 14.5 UG/MG (ref 0–30)
CREAT UR-MCNC: 69 MG/DL (ref 23–375)
MICROALBUMIN UR DL<=1MG/L-MCNC: 10 UG/ML

## 2023-11-08 PROCEDURE — 4010F ACE/ARB THERAPY RXD/TAKEN: CPT | Mod: CPTII,S$GLB,, | Performed by: PHYSICIAN ASSISTANT

## 2023-11-08 PROCEDURE — 1111F DSCHRG MED/CURRENT MED MERGE: CPT | Mod: CPTII,S$GLB,, | Performed by: PHYSICIAN ASSISTANT

## 2023-11-08 PROCEDURE — 3079F DIAST BP 80-89 MM HG: CPT | Mod: CPTII,S$GLB,, | Performed by: PHYSICIAN ASSISTANT

## 2023-11-08 PROCEDURE — 3074F SYST BP LT 130 MM HG: CPT | Mod: CPTII,S$GLB,, | Performed by: PHYSICIAN ASSISTANT

## 2023-11-08 PROCEDURE — 1160F PR REVIEW ALL MEDS BY PRESCRIBER/CLIN PHARMACIST DOCUMENTED: ICD-10-PCS | Mod: CPTII,S$GLB,, | Performed by: PHYSICIAN ASSISTANT

## 2023-11-08 PROCEDURE — 99999 PR PBB SHADOW E&M-EST. PATIENT-LVL IV: CPT | Mod: PBBFAC,,, | Performed by: PHYSICIAN ASSISTANT

## 2023-11-08 PROCEDURE — 1111F PR DISCHARGE MEDS RECONCILED W/ CURRENT OUTPATIENT MED LIST: ICD-10-PCS | Mod: CPTII,S$GLB,, | Performed by: PHYSICIAN ASSISTANT

## 2023-11-08 PROCEDURE — 3008F BODY MASS INDEX DOCD: CPT | Mod: CPTII,S$GLB,, | Performed by: PHYSICIAN ASSISTANT

## 2023-11-08 PROCEDURE — 3074F PR MOST RECENT SYSTOLIC BLOOD PRESSURE < 130 MM HG: ICD-10-PCS | Mod: CPTII,S$GLB,, | Performed by: PHYSICIAN ASSISTANT

## 2023-11-08 PROCEDURE — 3008F PR BODY MASS INDEX (BMI) DOCUMENTED: ICD-10-PCS | Mod: CPTII,S$GLB,, | Performed by: PHYSICIAN ASSISTANT

## 2023-11-08 PROCEDURE — 1159F PR MEDICATION LIST DOCUMENTED IN MEDICAL RECORD: ICD-10-PCS | Mod: CPTII,S$GLB,, | Performed by: PHYSICIAN ASSISTANT

## 2023-11-08 PROCEDURE — 3079F PR MOST RECENT DIASTOLIC BLOOD PRESSURE 80-89 MM HG: ICD-10-PCS | Mod: CPTII,S$GLB,, | Performed by: PHYSICIAN ASSISTANT

## 2023-11-08 PROCEDURE — 82043 UR ALBUMIN QUANTITATIVE: CPT | Performed by: PHYSICIAN ASSISTANT

## 2023-11-08 PROCEDURE — 99396 PREV VISIT EST AGE 40-64: CPT | Mod: S$GLB,,, | Performed by: PHYSICIAN ASSISTANT

## 2023-11-08 PROCEDURE — 3051F PR MOST RECENT HEMOGLOBIN A1C LEVEL 7.0 - < 8.0%: ICD-10-PCS | Mod: CPTII,S$GLB,, | Performed by: PHYSICIAN ASSISTANT

## 2023-11-08 PROCEDURE — 3051F HG A1C>EQUAL 7.0%<8.0%: CPT | Mod: CPTII,S$GLB,, | Performed by: PHYSICIAN ASSISTANT

## 2023-11-08 PROCEDURE — 99396 PR PREVENTIVE VISIT,EST,40-64: ICD-10-PCS | Mod: S$GLB,,, | Performed by: PHYSICIAN ASSISTANT

## 2023-11-08 PROCEDURE — 1159F MED LIST DOCD IN RCRD: CPT | Mod: CPTII,S$GLB,, | Performed by: PHYSICIAN ASSISTANT

## 2023-11-08 PROCEDURE — 1160F RVW MEDS BY RX/DR IN RCRD: CPT | Mod: CPTII,S$GLB,, | Performed by: PHYSICIAN ASSISTANT

## 2023-11-08 PROCEDURE — 99999 PR PBB SHADOW E&M-EST. PATIENT-LVL IV: ICD-10-PCS | Mod: PBBFAC,,, | Performed by: PHYSICIAN ASSISTANT

## 2023-11-08 PROCEDURE — 4010F PR ACE/ARB THEARPY RXD/TAKEN: ICD-10-PCS | Mod: CPTII,S$GLB,, | Performed by: PHYSICIAN ASSISTANT

## 2023-11-08 RX ORDER — ESOMEPRAZOLE MAGNESIUM 40 MG/1
40 CAPSULE, DELAYED RELEASE ORAL
Qty: 30 CAPSULE | Refills: 3 | Status: SHIPPED | OUTPATIENT
Start: 2023-11-08 | End: 2024-11-07

## 2023-11-08 RX ORDER — ATORVASTATIN CALCIUM 20 MG/1
20 TABLET, FILM COATED ORAL DAILY
Qty: 90 TABLET | Refills: 3 | Status: SHIPPED | OUTPATIENT
Start: 2023-11-08

## 2023-11-08 RX ORDER — ALPRAZOLAM 0.25 MG/1
0.25 TABLET ORAL NIGHTLY PRN
Qty: 30 TABLET | Refills: 1 | Status: SHIPPED | OUTPATIENT
Start: 2023-11-08 | End: 2023-11-14

## 2023-11-08 RX ORDER — ALPRAZOLAM 0.25 MG/1
0.5 TABLET ORAL NIGHTLY PRN
Qty: 30 TABLET | Refills: 1 | Status: CANCELLED | OUTPATIENT
Start: 2023-11-08 | End: 2023-12-08

## 2023-11-08 RX ORDER — ALPRAZOLAM 0.25 MG/1
0.25 TABLET ORAL NIGHTLY PRN
Qty: 30 TABLET | Refills: 1 | Status: CANCELLED | OUTPATIENT
Start: 2023-11-08

## 2023-11-08 NOTE — PROGRESS NOTES
"Subjective     Patient ID: Geo Lang is a 58 y.o. male.    Chief Complaint: Annual Exam    HPI    Established pt of Pj Velasco MD (new to me)      Here for annual exam.   Recent ventral hernia repair on 10/30, sx went well. No complications Taking zofran prn, pain well controlled having BMs    DM: A1c has improved, 9.9%-->7.8%. Glucose 138 this am    Anxiety/Insomnia: stable with alprazolam prn.     HTN: BP at goal on meds.       Past Medical History:   Diagnosis Date    Anxiety 10/02/2013    Chronic constipation     Corneal abrasion 20 yrs ago    ou from [facility]    Diabetes mellitus type I     Diverticulitis     GERD (gastroesophageal reflux disease)     HTN (hypertension) 10/02/2013    Hyperlipidemia     LPRD (laryngopharyngeal reflux disease) 12/10/2013    PONV (postoperative nausea and vomiting)     Sleep apnea     USES CPAP    Ventral hernia      Social History     Tobacco Use    Smoking status: Former     Current packs/day: 0.00     Average packs/day: 1 pack/day for 15.0 years (15.0 ttl pk-yrs)     Types: Cigarettes     Start date: 3/15/1998     Quit date: 3/15/2013     Years since quitting: 10.6    Smokeless tobacco: Never   Substance Use Topics    Alcohol use: Yes     Comment: OCC    Drug use: No     Review of patient's allergies indicates:  No Known Allergies    Review of Systems   Constitutional:  Negative for chills, fever and unexpected weight change.   Respiratory:  Negative for cough and shortness of breath.    Cardiovascular:  Negative for chest pain and leg swelling.   Gastrointestinal:  Positive for nausea. Negative for vomiting. Abdominal pain: slight, post-op.  Integumentary:  Negative for rash.   Neurological:  Negative for weakness and headaches.          Objective  /80 (BP Location: Right arm, Patient Position: Sitting, BP Method: Medium (Manual))   Pulse 77   Ht 5' 11" (1.803 m)   Wt 109 kg (240 lb 4.8 oz)   SpO2 98%   BMI 33.52 kg/m²       Physical Exam  Vitals reviewed. "   Constitutional:       General: He is not in acute distress.     Appearance: He is well-developed.   HENT:      Head: Normocephalic and atraumatic.      Right Ear: Tympanic membrane, ear canal and external ear normal.      Left Ear: Tympanic membrane, ear canal and external ear normal.   Cardiovascular:      Rate and Rhythm: Normal rate and regular rhythm.      Heart sounds: No murmur heard.  Pulmonary:      Effort: Pulmonary effort is normal.      Breath sounds: Normal breath sounds. No wheezing or rales.   Abdominal:      General: Bowel sounds are normal.      Palpations: Abdomen is soft.      Comments: Healing midline surgical incision, intake, staples in place, no wound dehiscence    Musculoskeletal:      Right lower leg: No edema.      Left lower leg: No edema.   Skin:     General: Skin is warm and dry.      Findings: No rash.   Neurological:      Mental Status: He is alert.   Psychiatric:         Mood and Affect: Mood normal.            Assessment and Plan     1. Annual physical exam  HM updated and reviewed  Plan to update vaccines at later day  Schedule fasting lipid panel    2. Essential hypertension  At goal  Continue chlorthalidone 12.5mg, atenolol 75mg and valsartan 320mg    3. Type 2 diabetes mellitus without complication, without long-term current use of insulin  Improving  Lab Results   Component Value Date    HGBA1C 7.8 (H) 10/18/2023   -     atorvastatin (LIPITOR) 20 MG tablet; Take 1 tablet (20 mg total) by mouth once daily.  Dispense: 90 tablet; Refill: 3  -     Microalbumin/Creatinine Ratio, Urine  -     schedule podiatry    4. Insomnia, unspecified type  stable  -     ALPRAZolam (XANAX) 0.25 MG tablet; Take 1 tablet (0.25 mg total) by mouth nightly as needed for Anxiety or Insomnia.  Dispense: 30 tablet; Refill: 1    5. Gastroesophageal reflux disease without esophagitis  Pt will try to wean to prn use  -     esomeprazole (NEXIUM) 40 MG capsule; Take 1 capsule (40 mg total) by mouth before  breakfast.  Dispense: 30 capsule; Refill: 3      RTC in 6 months or sooner of needed.     Jyoti Barth PA-C

## 2023-11-10 ENCOUNTER — LAB VISIT (OUTPATIENT)
Dept: LAB | Facility: HOSPITAL | Age: 58
End: 2023-11-10
Attending: INTERNAL MEDICINE
Payer: COMMERCIAL

## 2023-11-10 DIAGNOSIS — E11.9 TYPE 2 DIABETES MELLITUS WITHOUT COMPLICATION: ICD-10-CM

## 2023-11-10 LAB
CHOLEST SERPL-MCNC: 126 MG/DL (ref 120–199)
CHOLEST/HDLC SERPL: 3.8 {RATIO} (ref 2–5)
HDLC SERPL-MCNC: 33 MG/DL (ref 40–75)
HDLC SERPL: 26.2 % (ref 20–50)
LDLC SERPL CALC-MCNC: 69.8 MG/DL (ref 63–159)
NONHDLC SERPL-MCNC: 93 MG/DL
TRIGL SERPL-MCNC: 116 MG/DL (ref 30–150)

## 2023-11-10 PROCEDURE — 80061 LIPID PANEL: CPT | Performed by: INTERNAL MEDICINE

## 2023-11-10 PROCEDURE — 36415 COLL VENOUS BLD VENIPUNCTURE: CPT | Mod: PO | Performed by: INTERNAL MEDICINE

## 2023-11-11 NOTE — TELEPHONE ENCOUNTER
No care due was identified.  [facility] Embedded Care Due Messages. Reference number: [id number].   11/11/2023 4:16:16 PM CST

## 2023-11-14 ENCOUNTER — PATIENT MESSAGE (OUTPATIENT)
Dept: INTERNAL MEDICINE | Facility: CLINIC | Age: 58
End: 2023-11-14
Payer: COMMERCIAL

## 2023-11-14 RX ORDER — ALPRAZOLAM 1 MG/1
TABLET ORAL
Qty: 30 TABLET | Refills: 0 | Status: SHIPPED | OUTPATIENT
Start: 2023-11-14

## 2023-11-29 DIAGNOSIS — I10 ESSENTIAL HYPERTENSION: Chronic | ICD-10-CM

## 2023-11-29 RX ORDER — ATENOLOL 50 MG/1
75 TABLET ORAL DAILY
Qty: 135 TABLET | Refills: 0 | Status: SHIPPED | OUTPATIENT
Start: 2023-11-29 | End: 2024-03-06

## 2023-11-29 RX ORDER — CHLORTHALIDONE 25 MG/1
TABLET ORAL
Qty: 45 TABLET | Refills: 0 | Status: SHIPPED | OUTPATIENT
Start: 2023-11-29

## 2023-11-29 NOTE — TELEPHONE ENCOUNTER
No care due was identified.  [facility] Embedded Care Due Messages. Reference number: [id number].   11/29/2023 10:17:18 AM CST

## 2023-11-29 NOTE — TELEPHONE ENCOUNTER
"Refill Routing Note   Medication(s) are not appropriate for processing by [facility] for the following reason(s):        No active prescription written by provider: medication d/c at discharge and reordered by set to "No Print"    [facility] action(s):  Defer      ED documentation reviewed. No changes to therapy noted.   Follow up visit within next 90 days required.         Pharmacist review requested: Yes     Appointments  past 12m or future 3m with PCP    Date Provider   Last Visit   10/24/2023 Pj Velasco MD   Next Visit   5/10/2024 Pj Velasco MD   ED visits in past 90 days: 0        Note composed:4:30 PM 11/29/2023          "

## 2023-11-29 NOTE — TELEPHONE ENCOUNTER
Refill Routing Note   Medication(s) are not appropriate for processing by [facility] for the following reason(s):        No active prescription written by provider  ED/Hospital Visit since last OV with provider    [facility] action(s):  Defer        Medication Therapy Plan: Atenolol was discontinued at discharge on 11/02/2023; reordered and sent as no print; will repend and set to normal    Pharmacist review requested: Yes     Appointments  past 12m or future 3m with PCP    Date Provider   Last Visit   10/24/2023 Pj Velasco MD   Next Visit   5/10/2024 Pj Velasco MD   ED visits in past 90 days: 0        Note composed:4:03 PM 11/29/2023

## 2023-12-14 ENCOUNTER — OFFICE VISIT (OUTPATIENT)
Dept: PODIATRY | Facility: CLINIC | Age: 58
End: 2023-12-14
Payer: COMMERCIAL

## 2023-12-14 VITALS — BODY MASS INDEX: 33.64 KG/M2 | WEIGHT: 240.31 LBS | HEIGHT: 71 IN

## 2023-12-14 DIAGNOSIS — E11.9 TYPE 2 DIABETES MELLITUS WITHOUT COMPLICATION, WITHOUT LONG-TERM CURRENT USE OF INSULIN: Primary | ICD-10-CM

## 2023-12-14 DIAGNOSIS — L84 CORN OR CALLUS: ICD-10-CM

## 2023-12-14 DIAGNOSIS — M20.12 VALGUS DEFORMITY OF BOTH GREAT TOES: ICD-10-CM

## 2023-12-14 DIAGNOSIS — M21.621 TAILOR'S BUNION OF BOTH FEET: ICD-10-CM

## 2023-12-14 DIAGNOSIS — M20.11 VALGUS DEFORMITY OF BOTH GREAT TOES: ICD-10-CM

## 2023-12-14 DIAGNOSIS — M21.622 TAILOR'S BUNION OF BOTH FEET: ICD-10-CM

## 2023-12-14 PROCEDURE — 99213 PR OFFICE/OUTPT VISIT, EST, LEVL III, 20-29 MIN: ICD-10-PCS | Mod: S$GLB,,, | Performed by: PODIATRIST

## 2023-12-14 PROCEDURE — 1159F MED LIST DOCD IN RCRD: CPT | Mod: CPTII,S$GLB,, | Performed by: PODIATRIST

## 2023-12-14 PROCEDURE — 4010F PR ACE/ARB THEARPY RXD/TAKEN: ICD-10-PCS | Mod: CPTII,S$GLB,, | Performed by: PODIATRIST

## 2023-12-14 PROCEDURE — 3008F BODY MASS INDEX DOCD: CPT | Mod: CPTII,S$GLB,, | Performed by: PODIATRIST

## 2023-12-14 PROCEDURE — 4010F ACE/ARB THERAPY RXD/TAKEN: CPT | Mod: CPTII,S$GLB,, | Performed by: PODIATRIST

## 2023-12-14 PROCEDURE — 1159F PR MEDICATION LIST DOCUMENTED IN MEDICAL RECORD: ICD-10-PCS | Mod: CPTII,S$GLB,, | Performed by: PODIATRIST

## 2023-12-14 PROCEDURE — 3061F PR NEG MICROALBUMINURIA RESULT DOCUMENTED/REVIEW: ICD-10-PCS | Mod: CPTII,S$GLB,, | Performed by: PODIATRIST

## 2023-12-14 PROCEDURE — 3066F NEPHROPATHY DOC TX: CPT | Mod: CPTII,S$GLB,, | Performed by: PODIATRIST

## 2023-12-14 PROCEDURE — 3051F PR MOST RECENT HEMOGLOBIN A1C LEVEL 7.0 - < 8.0%: ICD-10-PCS | Mod: CPTII,S$GLB,, | Performed by: PODIATRIST

## 2023-12-14 PROCEDURE — 99999 PR PBB SHADOW E&M-EST. PATIENT-LVL II: CPT | Mod: PBBFAC,,, | Performed by: PODIATRIST

## 2023-12-14 PROCEDURE — 99213 OFFICE O/P EST LOW 20 MIN: CPT | Mod: S$GLB,,, | Performed by: PODIATRIST

## 2023-12-14 PROCEDURE — 3061F NEG MICROALBUMINURIA REV: CPT | Mod: CPTII,S$GLB,, | Performed by: PODIATRIST

## 2023-12-14 PROCEDURE — 3051F HG A1C>EQUAL 7.0%<8.0%: CPT | Mod: CPTII,S$GLB,, | Performed by: PODIATRIST

## 2023-12-14 PROCEDURE — 99999 PR PBB SHADOW E&M-EST. PATIENT-LVL II: ICD-10-PCS | Mod: PBBFAC,,, | Performed by: PODIATRIST

## 2023-12-14 PROCEDURE — 3008F PR BODY MASS INDEX (BMI) DOCUMENTED: ICD-10-PCS | Mod: CPTII,S$GLB,, | Performed by: PODIATRIST

## 2023-12-14 PROCEDURE — 3066F PR DOCUMENTATION OF TREATMENT FOR NEPHROPATHY: ICD-10-PCS | Mod: CPTII,S$GLB,, | Performed by: PODIATRIST

## 2023-12-14 NOTE — PROGRESS NOTES
Subjective:      Patient ID: Geo Lang is a 58 y.o. male.    Chief Complaint: Diabetes Mellitus and Diabetic Foot Exam      Geo is a 58 y.o. male who presents to the clinic for evaluation and treatment of diabetic feet. Geo has a past medical history of Anxiety (10/02/2013), Chronic constipation, Corneal abrasion (20 yrs ago), Diabetes mellitus type I, Diverticulitis, GERD (gastroesophageal reflux disease), HTN (hypertension) (10/02/2013), Hyperlipidemia, LPRD (laryngopharyngeal reflux disease) (12/10/2013), PONV (postoperative nausea and vomiting), Sleep apnea, and Ventral hernia. Patient relates no major problem with feet.  Denies overt neuropathy pain with today's exam.  Continues use of [place] shoe gear with prolonged physical activity.  Notes mild irritation to the site of both bunions and tailors bunions with use of sandals.  Has limited use of said shoes.  Denies noticing skin breakdown or compromise.  Denies any additional pedal complaints.    PCP: Pj Velasco MD    Date Last Seen by PCP: 11/23    Hemoglobin A1C   Date Value Ref Range Status   10/18/2023 7.8 (H) 0.0 - 5.6 % Final     Comment:     Reference Interval:  5.0 - 5.6 Normal   5.7 - 6.4 High Risk   > 6.5 Diabetic      Hgb A1c results are standardized based on the (NGSP) National   Glycohemoglobin Standardization Program.      Hemoglobin A1C levels are related to mean serum/plasma glucose   during the preceding 2-3 months.        06/28/2023 9.9 (H) 4.0 - 5.6 % Final     Comment:     ADA Screening Guidelines:  5.7-6.4%  Consistent with prediabetes  >or=6.5%  Consistent with diabetes    High levels of fetal hemoglobin interfere with the HbA1C  assay. Heterozygous hemoglobin variants (HbS, HgC, etc)do  not significantly interfere with this assay.   However, presence of multiple variants may affect accuracy.     10/24/2022 7.8 (H) 4.0 - 5.6 % Final     Comment:     ADA Screening Guidelines:  5.7-6.4%  Consistent with prediabetes  >or=6.5%   Consistent with diabetes    High levels of fetal hemoglobin interfere with the HbA1C  assay. Heterozygous hemoglobin variants (HbS, HgC, etc)do  not significantly interfere with this assay.   However, presence of multiple variants may affect accuracy.             Past Medical History:   Diagnosis Date    Anxiety 10/02/2013    Chronic constipation     Corneal abrasion 20 yrs ago    [facility]    Diabetes mellitus type I     Diverticulitis     GERD (gastroesophageal reflux disease)     HTN (hypertension) 10/02/2013    Hyperlipidemia     LPRD (laryngopharyngeal reflux disease) 12/10/2013    PONV (postoperative nausea and vomiting)     Sleep apnea     USES CPAP    Ventral hernia        Past Surgical History:   Procedure Laterality Date    COLONOSCOPY N/A 11/07/2015    Procedure: COLONOSCOPY;  Surgeon: Sanford Isidro MD;  Location: [address]);  Service: Endoscopy;  Laterality: N/A;    HERNIA REPAIR      LAPAROSCOPIC LYSIS OF ADHESIONS N/A 6/15/2022    Procedure: LYSIS, ADHESIONS, LAPAROSCOPIC;  Surgeon: Grant Fish MD;  Location: [facility] OR;  Service: General;  Laterality: N/A;    LAPAROSCOPIC REPAIR OF VENTRAL HERNIA N/A 6/15/2022    Procedure: REPAIR, HERNIA, VENTRAL, LAPAROSCOPIC;  Surgeon: Grant Fish MD;  Location: [facility] OR;  Service: General;  Laterality: N/A;    nissen      REPAIR OF RECURRENT VENTRAL HERNIA N/A 10/30/2023    Procedure: REPAIR, HERNIA, VENTRAL, RECURRENT;  Surgeon: Sami Carrillo MD;  Location: [facility] OR;  Service: General;  Laterality: N/A;    ROBOT-ASSISTED LAPAROSCOPIC RESECTION OF SIGMOID COLON USING DA JOHN XI N/A 11/19/2020    Procedure: XI ROBOTIC COLECTOMY, SIGMOID - ATTEMPTED CONVERTED TO OPEN;  Surgeon: Grant Fish MD;  Location: [facility] OR;  Service: General;  Laterality: N/A;  ATTEMPTED - CONVERTED TO OPEN PROCEDURE       Family History   Problem Relation Age of Onset    Hypertension Father     Hyperlipidemia Father     Heart failure Mother     Diabetes Mother      Hypertension Sister     Diabetes Sister     Hypertension Brother     Diabetes Brother     Hypertension Sister     No Known Problems Maternal Aunt     No Known Problems Maternal Uncle     No Known Problems Paternal Aunt     No Known Problems Paternal Uncle     No Known Problems Maternal Grandmother     No Known Problems Maternal Grandfather     No Known Problems Paternal Grandmother     No Known Problems Paternal Grandfather     Prostate cancer Cousin     Melanoma Neg Hx     Amblyopia Neg Hx     Blindness Neg Hx     Cancer Neg Hx     Cataracts Neg Hx     Glaucoma Neg Hx     Macular degeneration Neg Hx     Retinal detachment Neg Hx     Strabismus Neg Hx     Stroke Neg Hx     Thyroid disease Neg Hx     Colon cancer Neg Hx     Esophageal cancer Neg Hx        Social History     Socioeconomic History    Marital status: Single   Tobacco Use    Smoking status: Former     Current packs/day: 0.00     Average packs/day: 1 pack/day for 15.0 years (15.0 ttl pk-yrs)     Types: Cigarettes     Start date: 3/15/1998     Quit date: 3/15/2013     Years since quitting: 10.7    Smokeless tobacco: Never   Substance and Sexual Activity    Alcohol use: Yes     Comment: OCC    Drug use: No     Social Determinants of Health     Food Insecurity: No Food Insecurity (10/31/2023)    Hunger Vital Sign     Worried About Running Out of Food in the Last Year: Never true     Ran Out of Food in the Last Year: Never true   Transportation Needs: No Transportation Needs (10/31/2023)    PRAPARE - Transportation     Lack of Transportation (Medical): No     Lack of Transportation (Non-Medical): No   Housing Stability: Low Risk  (10/31/2023)    Housing Stability Vital Sign     Unable to Pay for Housing in the Last Year: No     Number of Places Lived in the Last Year: 1     Unstable Housing in the Last Year: No       Current Outpatient Medications   Medication Sig Dispense Refill    ALPRAZolam (XANAX) 1 MG tablet TAKE 1 TABLET BY MOUTH ONCE DAILY AS NEEDED  FOR INSOMNIA 30 tablet 0    atenoloL (TENORMIN) 25 MG tablet Take 1 tablet (25 mg total) by mouth every evening. 30 tablet 11    atenoloL (TENORMIN) 50 MG tablet Take 1.5 tablets (75 mg total) by mouth once daily. 135 tablet 0    atorvastatin (LIPITOR) 20 MG tablet Take 1 tablet (20 mg total) by mouth once daily. 90 tablet 3    blood sugar diagnostic Strp Use daily as directed 100 each 4    blood-glucose meter kit To check BG 2 times daily, to use with insurance preferred meter 1 each 1    chlorthalidone (HYGROTEN) 25 MG Tab Take 1/2 (one-half) tablet by mouth once daily 45 tablet 0    docusate sodium (COLACE) 100 MG capsule Take 300 mg by mouth once daily.      empagliflozin-metformin (SYNJARDY) 5-1,000 mg Tab Take 1 tablet by mouth 2 (two) times daily. 180 tablet 12    esomeprazole (NEXIUM) 40 MG capsule Take 1 capsule (40 mg total) by mouth before breakfast. 30 capsule 3    lancets Misc To check BG 2 times daily, to use with insurance preferred meter 100 each 1    ondansetron (ZOFRAN-ODT) 8 MG TbDL Take 1 tablet (8 mg total) by mouth 2 (two) times daily. 30 tablet 1    valsartan (DIOVAN) 320 MG tablet Take 1 tablet by mouth once daily 90 tablet 3     No current facility-administered medications for this visit.       Review of patient's allergies indicates:  No Known Allergies      Review of Systems   Constitutional: Negative for chills and fever.   Cardiovascular:  Negative for claudication and leg swelling.   Skin:  Negative for color change and nail changes.   Musculoskeletal:  Negative for joint pain, joint swelling, muscle cramps and muscle weakness.   Neurological:  Negative for numbness and paresthesias.   Psychiatric/Behavioral:  Negative for altered mental status.            Objective:      Physical Exam  Constitutional:       General: He is not in acute distress.     Appearance: He is well-developed. He is not diaphoretic.   Cardiovascular:      Pulses:           Dorsalis pedis pulses are 2+ on the right  side and 2+ on the left side.        Posterior tibial pulses are 2+ on the right side and 2+ on the left side.      Comments: CFT is < 3 seconds bilateral.  Pedal hair growth is present bilateral.  No varicosities noted bilateral.  No lower extremity edema noted bilateral.  Toes are warm to touch bilateral.    Musculoskeletal:         General: No tenderness.      Comments: Muscle strength 5/5 in all muscle groups bilateral.  No tenderness nor crepitation with ROM of foot/ankle joints bilateral.  No tenderness with palpation of bilateral foot and ankle.  Bilateral pes planus foot type.  Bilateral hallux abducto valgus.  Bilateral Tailors bunion.  Rectus alignment of remaining lesser digits bilateral.   Skin:     General: Skin is warm and dry.      Capillary Refill: Capillary refill takes less than 2 seconds.      Findings: Lesion present. No abrasion, bruising, burn, ecchymosis, laceration or petechiae.      Comments: Pedal skin has normal turgor, temperature, and texture bilateral.  Dystrophic changes note to the distal most tip of the Lt. Hallux toenail.  Remaining toenails x 9 appear normotrophic.  Light hyperkeratotic lesions noted to the Lt. Medial hallux and 1st mtp joint.  Examination of the skin reveals no evidence of significant maceration, rashes, open lesions, suspicious appearing nevi or other concerning lesions.    Neurological:      Mental Status: He is alert and oriented to person, place, and time.      Sensory: No sensory deficit.      Comments: Protective sensation per Wirtz-Yakov monofilament is intact bilateral.  Vibratory sensation is intact bilateral.  Light touch is intact bilateral.               Assessment:       Encounter Diagnoses   Name Primary?    Type 2 diabetes mellitus without complication, without long-term current use of insulin Yes    Valgus deformity of both great toes     Tailor's bunion of both feet     Corn or callus            Plan:       Geo was seen today for diabetes  mellitus and diabetic foot exam.    Diagnoses and all orders for this visit:    Type 2 diabetes mellitus without complication, without long-term current use of insulin    Valgus deformity of both great toes    Tailor's bunion of both feet    Corn or callus        I counseled the patient on his conditions, their implications and medical management.    Advised to continue moisturizing the feet with a 40% urea and 2% salicylic acid cream.    Advised to continue wearing shoe gear that accommodates digital deformities.    Shoe inspection. Diabetic Foot Education. Patient reminded of the importance of good nutrition and blood sugar control to help prevent podiatric complications of diabetes. Patient instructed on proper foot hygeine. We discussed wearing proper shoe gear, daily foot inspections, never walking without protective shoe gear, never putting sharp instruments to feet    Patient instructed to inspect his feet, wear protective shoe gear when ambulatory, moisturizer to maintain skin integrity and follow in this office in approximately 12 months, sooner p.r.n.    Ha Tobias DPM

## 2023-12-27 RX ORDER — EMPAGLIFLOZIN AND METFORMIN HYDROCHLORIDE 5; 1000 MG/1; MG/1
1 TABLET ORAL 2 TIMES DAILY
Qty: 180 TABLET | Refills: 12 | Status: SHIPPED | OUTPATIENT
Start: 2023-12-27

## 2023-12-27 NOTE — TELEPHONE ENCOUNTER
No care due was identified.  [facility] Embedded Care Due Messages. Reference number: [id number].   12/27/2023 2:01:44 PM CST

## 2024-03-06 RX ORDER — ATENOLOL 50 MG/1
75 TABLET ORAL
Qty: 135 TABLET | Refills: 0 | Status: SHIPPED | OUTPATIENT
Start: 2024-03-06

## 2024-03-06 NOTE — TELEPHONE ENCOUNTER
Care Due:                  Date            Visit Type   Department     Provider  --------------------------------------------------------------------------------                                ESTABLISHED                              PATIENT -    [facility] INTERNAL  Last Visit: 10-      [facility]      MEDICINE       Pj [email or web address]                              FOLLOWUP/OF  [facility] INTERNAL  Next Visit: 03-      [facility] V[facility]IT   MEDICINE       Pj Velasco                                                            Last  Test          Frequency    Reason                     Performed    Due Date  --------------------------------------------------------------------------------    HBA1C.......  6 months...  empagliflozin-metformin..  10-   04-    [employer] Embedded Care Due Messages. Reference number: [id number].   3/06/2024 12:09:23 PM CST

## 2024-04-04 DIAGNOSIS — I10 ESSENTIAL HYPERTENSION: Chronic | ICD-10-CM

## 2024-04-04 NOTE — TELEPHONE ENCOUNTER
No care due was identified.  [facility] Embedded Care Due Messages. Reference number: [id number].   4/04/2024 3:44:49 PM CDT

## 2024-04-05 RX ORDER — CHLORTHALIDONE 25 MG/1
TABLET ORAL
Qty: 45 TABLET | Refills: 1 | Status: SHIPPED | OUTPATIENT
Start: 2024-04-05

## 2024-04-05 NOTE — TELEPHONE ENCOUNTER
Refill Decision Note   Geo Lnag  is requesting a refill authorization.  Brief Assessment and Rationale for Refill:  Approve     Medication Therapy Plan: ED visit unrelated      Comments:     Note composed:3:29 PM 04/05/2024

## 2024-05-10 ENCOUNTER — OFFICE VISIT (OUTPATIENT)
Dept: INTERNAL MEDICINE | Facility: CLINIC | Age: 59
End: 2024-05-10
Payer: COMMERCIAL

## 2024-05-10 ENCOUNTER — LAB VISIT (OUTPATIENT)
Dept: LAB | Facility: HOSPITAL | Age: 59
End: 2024-05-10
Attending: INTERNAL MEDICINE
Payer: COMMERCIAL

## 2024-05-10 VITALS
OXYGEN SATURATION: 96 % | HEIGHT: 71 IN | SYSTOLIC BLOOD PRESSURE: 128 MMHG | BODY MASS INDEX: 34.69 KG/M2 | DIASTOLIC BLOOD PRESSURE: 76 MMHG | WEIGHT: 247.81 LBS | HEART RATE: 80 BPM

## 2024-05-10 DIAGNOSIS — Z00.00 ROUTINE PHYSICAL EXAMINATION: Primary | ICD-10-CM

## 2024-05-10 DIAGNOSIS — S80.212A ABRASION OF LEFT KNEE, INITIAL ENCOUNTER: ICD-10-CM

## 2024-05-10 DIAGNOSIS — Z12.5 SCREENING FOR PROSTATE CANCER: ICD-10-CM

## 2024-05-10 DIAGNOSIS — K21.9 GASTROESOPHAGEAL REFLUX DISEASE WITHOUT ESOPHAGITIS: ICD-10-CM

## 2024-05-10 DIAGNOSIS — I10 ESSENTIAL HYPERTENSION: Chronic | ICD-10-CM

## 2024-05-10 DIAGNOSIS — E78.2 MIXED HYPERLIPIDEMIA: Chronic | ICD-10-CM

## 2024-05-10 DIAGNOSIS — E11.9 TYPE 2 DIABETES MELLITUS WITHOUT COMPLICATION, WITHOUT LONG-TERM CURRENT USE OF INSULIN: ICD-10-CM

## 2024-05-10 LAB
COMPLEXED PSA SERPL-MCNC: 0.58 NG/ML (ref 0–4)
ESTIMATED AVG GLUCOSE: 192 MG/DL (ref 68–131)
HBA1C MFR BLD: 8.3 % (ref 4–5.6)

## 2024-05-10 PROCEDURE — 3078F DIAST BP <80 MM HG: CPT | Mod: CPTII,S$GLB,, | Performed by: INTERNAL MEDICINE

## 2024-05-10 PROCEDURE — 1160F RVW MEDS BY RX/DR IN RCRD: CPT | Mod: CPTII,S$GLB,, | Performed by: INTERNAL MEDICINE

## 2024-05-10 PROCEDURE — 83036 HEMOGLOBIN GLYCOSYLATED A1C: CPT | Performed by: INTERNAL MEDICINE

## 2024-05-10 PROCEDURE — 36415 COLL VENOUS BLD VENIPUNCTURE: CPT | Performed by: INTERNAL MEDICINE

## 2024-05-10 PROCEDURE — 99396 PREV VISIT EST AGE 40-64: CPT | Mod: S$GLB,,, | Performed by: INTERNAL MEDICINE

## 2024-05-10 PROCEDURE — 1159F MED LIST DOCD IN RCRD: CPT | Mod: CPTII,S$GLB,, | Performed by: INTERNAL MEDICINE

## 2024-05-10 PROCEDURE — 84153 ASSAY OF PSA TOTAL: CPT | Performed by: INTERNAL MEDICINE

## 2024-05-10 PROCEDURE — 3074F SYST BP LT 130 MM HG: CPT | Mod: CPTII,S$GLB,, | Performed by: INTERNAL MEDICINE

## 2024-05-10 PROCEDURE — 99999 PR PBB SHADOW E&M-EST. PATIENT-LVL IV: CPT | Mod: PBBFAC,,, | Performed by: INTERNAL MEDICINE

## 2024-05-10 PROCEDURE — 3072F LOW RISK FOR RETINOPATHY: CPT | Mod: CPTII,S$GLB,, | Performed by: INTERNAL MEDICINE

## 2024-05-10 PROCEDURE — 4010F ACE/ARB THERAPY RXD/TAKEN: CPT | Mod: CPTII,S$GLB,, | Performed by: INTERNAL MEDICINE

## 2024-05-10 PROCEDURE — 3008F BODY MASS INDEX DOCD: CPT | Mod: CPTII,S$GLB,, | Performed by: INTERNAL MEDICINE

## 2024-05-10 RX ORDER — MUPIROCIN 20 MG/G
OINTMENT TOPICAL 3 TIMES DAILY
Qty: 30 G | Refills: 25 | Status: SHIPPED | OUTPATIENT
Start: 2024-05-10

## 2024-05-10 NOTE — PROGRESS NOTES
Subjective:       Patient ID: Geo Lang is a 58 y.o. male.    Chief Complaint: Annual Exam, Tinnitus (Left ear, Seen by Ear clinic ), and Fall (Scraped knee, requesting Rx )    Patient here annual exam wanted to follow-up a knee abrasion and some left ear issues.  Patient states his left ear started giving him trouble around Easter, about weeks ago.  He was seen urgent ENT and given some drops for the left ear and told to take Flonase.  A lot of wax was removed.  Went back a week or so later and he was told it was improved and to give it more time.  Since then he has had some ringing in the ear and it just does not quite feel right so he wanted me to take a look.  He stopped his right knee and has an abrasion.  He has been using over-the-counter cream but would like mupirocin.    He is worried that his A1c will be a little elevated.  He is due for a PSA.      Review of Systems   Constitutional:  Negative for chills, fatigue and fever.   HENT:  Positive for ear pain. Negative for nosebleeds and trouble swallowing.    Eyes:  Negative for pain and visual disturbance.   Respiratory:  Negative for cough, shortness of breath and wheezing.    Cardiovascular:  Negative for chest pain and palpitations.   Gastrointestinal:  Negative for abdominal pain, constipation, diarrhea, nausea and vomiting.   Genitourinary:  Negative for difficulty urinating and hematuria.   Musculoskeletal:  Negative for arthralgias, back pain and neck pain.   Integumentary:  Negative for rash.   Neurological:  Negative for dizziness and headaches.   Hematological:  Does not bruise/bleed easily.   Psychiatric/Behavioral:  Negative for dysphoric mood and sleep disturbance.            Past Medical History:   Diagnosis Date    Anxiety 10/02/2013    Chronic constipation     Corneal abrasion 20 yrs ago    ou from [facility]    Diabetes mellitus type I     Diverticulitis     GERD (gastroesophageal reflux disease)     HTN (hypertension) 10/02/2013     Hyperlipidemia     LPRD (laryngopharyngeal reflux disease) 12/10/2013    PONV (postoperative nausea and vomiting)     Sleep apnea     USES CPAP    Ventral hernia      Past Surgical History:   Procedure Laterality Date    COLONOSCOPY N/A 11/07/2015    Procedure: COLONOSCOPY;  Surgeon: Sanford Isidro MD;  Location: [address]);  Service: Endoscopy;  Laterality: N/A;    HERNIA REPAIR      LAPAROSCOPIC LYSIS OF ADHESIONS N/A 6/15/2022    Procedure: LYSIS, ADHESIONS, LAPAROSCOPIC;  Surgeon: Grant Fish MD;  Location: [facility] OR;  Service: General;  Laterality: N/A;    LAPAROSCOPIC REPAIR OF VENTRAL HERNIA N/A 6/15/2022    Procedure: REPAIR, HERNIA, VENTRAL, LAPAROSCOPIC;  Surgeon: Grant Fish MD;  Location: [facility] OR;  Service: General;  Laterality: N/A;    nissen      REPAIR OF RECURRENT VENTRAL HERNIA N/A 10/30/2023    Procedure: REPAIR, HERNIA, VENTRAL, RECURRENT;  Surgeon: Sami Carrillo MD;  Location: [facility] OR;  Service: General;  Laterality: N/A;    ROBOT-ASSISTED LAPAROSCOPIC RESECTION OF SIGMOID COLON USING DA JOHN XI N/A 11/19/2020    Procedure: XI ROBOTIC COLECTOMY, SIGMOID - ATTEMPTED CONVERTED TO OPEN;  Surgeon: Grant Fish MD;  Location: [facility] OR;  Service: General;  Laterality: N/A;  ATTEMPTED - CONVERTED TO OPEN PROCEDURE      Patient Active Problem List   Diagnosis    Essential hypertension    Anxiety    LPRD (laryngopharyngeal reflux disease)    GERD (gastroesophageal reflux disease)    Obesity (BMI 30-39.9)    Pain in the chest    Type 2 diabetes mellitus without complication    HLD (hyperlipidemia)    CALVIN (obstructive sleep apnea)    Colon cancer screening    Diverticulosis of large intestine without hemorrhage    Dyspnea on exertion    Acute diverticulitis of intestine    Pneumatosis intestinalis of small intestine    Postoperative nausea    Ventral hernia with obstruction and without gangrene    Ventral hernia, recurrent        Objective:      Physical Exam  Constitutional:        General: He is not in acute distress.     Appearance: He is well-developed.   HENT:      Head: Normocephalic and atraumatic.      Right Ear: Tympanic membrane, ear canal and external ear normal.      Left Ear: Tympanic membrane and external ear normal.      Ears:      Comments: Somewhat moist waxy material lining the canal.  When I removed a little under direct visualization there is a purple hint of color from the gentian violet.  The TM looks okay.  No tenderness to removing the wax     Mouth/Throat:      Pharynx: No oropharyngeal exudate or posterior oropharyngeal erythema.   Eyes:      General: No scleral icterus.     Conjunctiva/sclera: Conjunctivae normal.      Pupils: Pupils are equal, round, and reactive to light.   Neck:      Thyroid: No thyromegaly.      Comments: No supraclavicular nodes palpated  Cardiovascular:      Rate and Rhythm: Normal rate and regular rhythm.      Pulses: Normal pulses.      Heart sounds: Normal heart sounds. No murmur heard.  Pulmonary:      Effort: Pulmonary effort is normal.      Breath sounds: Normal breath sounds. No wheezing.   Abdominal:      General: Bowel sounds are normal.      Palpations: Abdomen is soft. There is no mass.      Tenderness: There is no abdominal tenderness.   Musculoskeletal:         General: No tenderness.      Cervical back: Normal range of motion and neck supple.      Right lower leg: No edema.      Left lower leg: No edema.   Lymphadenopathy:      Cervical: No cervical adenopathy.   Skin:     Coloration: Skin is not jaundiced or pale.   Neurological:      General: No focal deficit present.      Mental Status: He is alert and oriented to person, place, and time.   Psychiatric:         Mood and Affect: Mood normal.         Behavior: Behavior normal.         Assessment:       Problem List Items Addressed This Visit          Cardiac/Vascular    Essential hypertension (Chronic)    HLD (hyperlipidemia) (Chronic)       Endocrine    Type 2 diabetes mellitus  "without complication (Chronic)    Relevant Orders    Hemoglobin A1C       GI    GERD (gastroesophageal reflux disease)     Other Visit Diagnoses       Routine physical examination    -  Primary    Screening for prostate cancer        Relevant Orders    PSA, Screening    Abrasion of left knee, initial encounter                Plan:         Geo was seen today for annual exam, tinnitus and fall.    Diagnoses and all orders for this visit:    Routine physical examination    Screening for prostate cancer  -     PSA, Screening; Future    Type 2 diabetes mellitus without complication, without long-term current use of insulin  -     Hemoglobin A1C; Future    Gastroesophageal reflux disease without esophagitis    Mixed hyperlipidemia    Essential hypertension    Abrasion of left knee, initial encounter    Other orders  -     mupirocin (BACTROBAN) 2 % ointment; Apply topically 3 (three) times daily.           Trial of antihistamine by mouth such as Zyrtec or Claritin.  Consider ENT follow-up.    Review labs.  Work on weight reduction.          Portions of this note may have been created with voice recognition software. Occasional "wrong-word" or "sound-a-like" substitutions may have occurred due to the inherent limitations of voice recognition software. Please, read the note carefully and recognize, using context, where substitutions have occurred.  "

## 2024-05-14 ENCOUNTER — PATIENT MESSAGE (OUTPATIENT)
Dept: INTERNAL MEDICINE | Facility: CLINIC | Age: 59
End: 2024-05-14
Payer: COMMERCIAL

## 2024-05-14 DIAGNOSIS — E11.9 TYPE 2 DIABETES MELLITUS WITHOUT COMPLICATION, WITHOUT LONG-TERM CURRENT USE OF INSULIN: Primary | ICD-10-CM

## 2024-05-15 DIAGNOSIS — E11.9 TYPE 2 DIABETES MELLITUS WITHOUT COMPLICATION, UNSPECIFIED WHETHER LONG TERM INSULIN USE: ICD-10-CM

## 2024-06-04 ENCOUNTER — CLINICAL SUPPORT (OUTPATIENT)
Dept: AUDIOLOGY | Facility: CLINIC | Age: 59
End: 2024-06-04
Payer: COMMERCIAL

## 2024-06-04 ENCOUNTER — OFFICE VISIT (OUTPATIENT)
Dept: OTOLARYNGOLOGY | Facility: CLINIC | Age: 59
End: 2024-06-04
Payer: COMMERCIAL

## 2024-06-04 VITALS — BODY MASS INDEX: 35.43 KG/M2 | HEIGHT: 71 IN | WEIGHT: 253.06 LBS

## 2024-06-04 DIAGNOSIS — H93.8X3 EAR FULLNESS, BILATERAL: Primary | ICD-10-CM

## 2024-06-04 DIAGNOSIS — H90.3 ASYMMETRICAL SENSORINEURAL HEARING LOSS: ICD-10-CM

## 2024-06-04 DIAGNOSIS — H61.22 IMPACTED CERUMEN OF LEFT EAR: Primary | ICD-10-CM

## 2024-06-04 DIAGNOSIS — H93.13 TINNITUS, BILATERAL: ICD-10-CM

## 2024-06-04 DIAGNOSIS — H93.12 TINNITUS, LEFT: Primary | ICD-10-CM

## 2024-06-04 PROCEDURE — G0268 REMOVAL OF IMPACTED WAX MD: HCPCS | Mod: LT,S$GLB,, | Performed by: NURSE PRACTITIONER

## 2024-06-04 PROCEDURE — 3052F HG A1C>EQUAL 8.0%<EQUAL 9.0%: CPT | Mod: CPTII,S$GLB,, | Performed by: NURSE PRACTITIONER

## 2024-06-04 PROCEDURE — 99999 PR PBB SHADOW E&M-EST. PATIENT-LVL III: CPT | Mod: PBBFAC,,, | Performed by: NURSE PRACTITIONER

## 2024-06-04 PROCEDURE — 92567 TYMPANOMETRY: CPT | Mod: S$GLB,,, | Performed by: AUDIOLOGIST-HEARING AID FITTER

## 2024-06-04 PROCEDURE — 1159F MED LIST DOCD IN RCRD: CPT | Mod: CPTII,S$GLB,, | Performed by: NURSE PRACTITIONER

## 2024-06-04 PROCEDURE — 4010F ACE/ARB THERAPY RXD/TAKEN: CPT | Mod: CPTII,S$GLB,, | Performed by: NURSE PRACTITIONER

## 2024-06-04 PROCEDURE — 3008F BODY MASS INDEX DOCD: CPT | Mod: CPTII,S$GLB,, | Performed by: NURSE PRACTITIONER

## 2024-06-04 PROCEDURE — 3072F LOW RISK FOR RETINOPATHY: CPT | Mod: CPTII,S$GLB,, | Performed by: NURSE PRACTITIONER

## 2024-06-04 PROCEDURE — 99999 PR PBB SHADOW E&M-EST. PATIENT-LVL I: CPT | Mod: PBBFAC,,, | Performed by: AUDIOLOGIST-HEARING AID FITTER

## 2024-06-04 PROCEDURE — 92557 COMPREHENSIVE HEARING TEST: CPT | Mod: S$GLB,,, | Performed by: AUDIOLOGIST-HEARING AID FITTER

## 2024-06-04 PROCEDURE — 99203 OFFICE O/P NEW LOW 30 MIN: CPT | Mod: 25,S$GLB,, | Performed by: NURSE PRACTITIONER

## 2024-06-04 NOTE — PROGRESS NOTES
Geo Lang was seen 06/04/2024 for an audiological evaluation. Pt was alone during today's visit. Pertinent complaints today include hearing loss, AS>AD since the beginning of April and tinnitus left ear only. Pt confirms history of loud noise exposure and denies early onset of genetic family history of hearing loss.     Results reveal a normal through 6K Hz sloping mild HF sensorineural hearing loss for the right ear and  normal through 4K Hz sloping to moderate HF  sensorineural hearing loss for the left ear.    Speech Reception Thresholds were  5 dBHL for the right ear and 5 dBHL for the left ear.    Word recognition scores were excellent for the right ear and excellent for the left ear.   Tympanograms were Type A for the right ear and Type A for the left ear.    Audiogram results were reviewed in detail with patient and all questions were answered. Results will be reviewed by the referring provider at the completion of this note. All complaints were addressed during this visit to the patient's satisfaction. Recommend further medical evaluation with an ENT provider for the asymmetry, AS>AD, repeat hearing testing in one year to monitor the asymmetry and bilateral hearing protection with either muffs or in-ear protection in loud noises. Plan of care was discussed in detail with the patient, who agreed with the plan as above.

## 2024-06-04 NOTE — PATIENT INSTRUCTIONS
Tinnitus (Ringing in the Ears)  Tinnitus is the term for a noise in your ear not caused by an outside sound. The noise might be a ringing, buzzing, hissing, roaring. It can vary in pitch and may be soft or quite loud. For some people, tinnitus is a minor nuisance. But for others, the noise can make it hard to hear, work, and even sleep. When tinnitus can't be cured, a number of treatments may offer relief.  What causes tinnitus?  Loud noises, hearing loss, and ear wax can cause tinnitus. So can certain medicines. Common medications that can cause tinnitus include antidepressants, pain medications, antianxiety meds, blood pressure meds, accutane, antibiotics, antivirals, chemotherapy, seizure meds, and bipolar meds. Large amounts of aspirin or caffeine are sometimes to blame. In many cases, the exact cause of tinnitus is unknown.   How is tinnitus treated?  Identifying and removing the cause is the best way to treat tinnitus. For that reason, your healthcare provider may refer you to an otolaryngologist (ear, nose, and throat doctor). Your hearing may also be checked by an audiologist (hearing specialist). If you have hearing loss, wearing a hearing aid may help your tinnitus. When the cause can't be found, the tinnitus itself may be treated. Some of the treatments are listed below, and your healthcare provider can tell you more about them:  Avoid exposure to loud sounds, which will exacerbate tinnitus.  Wear ear protection around loud noises.  Get your blood pressure check regularly.  If it is high, see your doctor.  Check with your PCP whether labs can be done to check for anemia and hyperthyroid, as these can worsen tinnitus.   Decrease salt intake.  Avoid stimulants such as caffeine and tobacco.  Exercise daily to improve circulation. Poor circulation worsens tinnitus.   Avoid sleep deprivation. Sleep deprivation and insomnia can worsen tinnitus. Get adequate rest.  Reduce aspirin use, if possible. Aspirin as  well as NSAIDs and narcotic pain relievers can exacerbate tinnitus.   Stop worrying about the noise.  Stress worsens tinnitus. Recognize the noise as an annoyance and learn to ignore it as much as possible. Patients with higher rates of anxiety, depression, concentration, or problems sleeping may need to discuss taking something for anxiety or depression with their primary care provider.     Consider a trial of lipoflavonoids, slow-release niacin, or Arches' Tinnitus Formula (over-the-counter).  Purchase a white noise machine to mask tinnitus.  The [employer] Tinnitus Relief quang on your smart phone uses a combination of sounds and relaxing exercises that aim to distract your brain from focusing on tinnitus. Over time the brain learns to focus less on the tinnitus.   When no underlying pathology can be identified, an audiogram is needed to screen for hearing loss. If hearing loss is noted, hearing aids with masking technology are recommended to help relieve tinnitus.   Maskers are small devices that look like hearing aids. They emit a pleasant sound that helps cover up the ringing in your ears, similar to the technology used in noise-cancelling headphones. Hearing aids and maskers are sometimes used together.  Cognitive Behavioral Therapy (CBT), otherwise known as Tinnitus Retraining Therapy (TRT), can be done by either an audiologist or a cognitive behavioral therapist who is certified in TRT. Tinnitus retraining therapy combines biofeedback, counseling and maskers.   Medicines that treat anxiety and depression may ease tinnitus in some people.  Hypnosis or relaxation therapy may help noise seem less severe.    First-line treatment for tinnitus:  Elimination of exacerbating factors such as elevated blood pressure, high sodium intake, caffeine/stimulants, sleep deprivation/insomnia, certain medications, aspirin, exposure to loud sounds, etc. White noise is recommended as first-line treatment.    Second-line treatment  for tinnitus:  Maskers (with or without the use of a hearing aid depending on the outcome of your hearing test) and/or Cognitive Behavior Therapy (Tinnitus Retraining Therapy).  To find an audiologist or Cognitive Behavioral Therapist in your area who is certified in Tinnitus Retraining Therapy, you must contact the American Tinnitus Association at [phone] or www.jack.org. We have had some success with the [facility] Sound Therapy Program.  If needed, we can refer you to Psych Department at [facility] for CBT.  The American Tinnitus Association may have additional resources.  Anaya Leyva at [place] would be willing to discuss further if needed, [phone].     Tinnitus Retraining Therapy (TRT):  Some audiologists have experience doing tinnitus evaluations with pitch matching, counseling,and hearing aids that contain tinnitus solutions, so check with your audiologist first.    There is an audiologist at [facility] in [place] named Freddy Montaño who sees patients for TRT.  Please call [phone] for more information.     For more information  American Speech-Hearing-Language Association [phone] www.román.org  American Tinnitus Association [phone] www.jack.org  National [place] on Deafness and other Communication Disorders [phone] www.nidcd.nih.gov

## 2024-06-04 NOTE — PROGRESS NOTES
Geo Lang was seen 06/04/2024 for tympanometry per order from Radha Garland NP, ENT due to complaint of ear fullness. Pt was alone during today's visit. Results indicate Type A for the right ear indicating normal middle ear function and Type A for the left ear indicating normal middle ear function.     Results will be reviewed by ENT following this encounter. All complaints were addressed during this visit to the patient's satisfaction. Plan of care was discussed in detail with the patient, who agreed with the plan as above.

## 2024-06-04 NOTE — PROGRESS NOTES
"Subjective     Patient ID: Geo Lang is a 58 y.o. male.    Chief Complaint: Ear Fullness    HPI  Patient last seen here >10 years ago by Dr. Stewart for rhinitis and CALVIN; treated with Astelin. Patient is new to ENT, self-referred for aural congestion. Patient states his left ear began to feel blocked on Easter Sunday. He went to [facility] where ear was cleaned, gentian violet was applied to his left ear, and Flonase was prescribed for ETD. Patient states his left ear continues to feel blocked. His baseline ringing (AU) has worsened AS. He denies impairment in hearing. He denies otorrhea or otorrhagia.     Review of Systems   Constitutional: Negative.    HENT: Negative.     Eyes: Negative.    Respiratory: Negative.     Cardiovascular: Negative.    Gastrointestinal: Negative.    Musculoskeletal: Negative.    Integumentary:  Negative.   Neurological: Negative.    Hematological: Negative.    Psychiatric/Behavioral: Negative.          Objective     Physical Exam  Vitals and nursing note reviewed.   Constitutional:       General: He is not in acute distress.     Appearance: He is well-developed. He is not ill-appearing.   HENT:      Head: Normocephalic and atraumatic.      Right Ear: Hearing, tympanic membrane, ear canal and external ear normal. No middle ear effusion. Tympanic membrane is not erythematous. Tympanic membrane has normal mobility.      Left Ear: Hearing, tympanic membrane, ear canal and external ear normal.  No middle ear effusion. Tympanic membrane is not erythematous. Tympanic membrane has normal mobility.      Ears:      Comments: Type "A" tympanograms AU consistent with well-pneumatized mesotympanums and absence of middle ear effusions     Nose: Nose normal.   Eyes:      General: Lids are normal. No scleral icterus.        Right eye: No discharge.         Left eye: No discharge.   Neck:      Trachea: Trachea normal. No tracheal deviation.   Cardiovascular:      Rate and Rhythm: Normal rate. "   Pulmonary:      Effort: Pulmonary effort is normal. No respiratory distress.      Breath sounds: No stridor. No wheezing.   Musculoskeletal:         General: Normal range of motion.      Cervical back: Normal range of motion.   Skin:     General: Skin is warm and dry.   Neurological:      Mental Status: He is alert and oriented to person, place, and time.      Coordination: Coordination is intact.      Gait: Gait normal.   Psychiatric:         Attention and Perception: Attention normal.         Mood and Affect: Mood normal.         Speech: Speech normal.         Behavior: Behavior normal. Behavior is cooperative.       SEPARATE PROCEDURE IN OFFICE:   Procedure: Removal of impacted cerumen, LEFT   Pre Procedure Diagnosis: Cerumen Impaction   Post Procedure Diagnosis: Cerumen Impaction   Verbal informed consent in regards to risk of trauma to ear canal, ear drum or hearing, discomfort during procedure and/or inability to remove cerumen impaction in one session or unforeseen events or complications.   No anesthesia.     Procedure in detail:   Ear canal visualized bilateral with appropriate size ear speculum utilizing Operating Head Binocular Otomicroscope   Utilizing the following:  Suction cannula was used AS. The impacted cerumen of the ear canals was removed atraumatically. The TM and EAC were then inspected and found to be clear of wax. See description of TMs/EACs in PE above.   Complications: No   Condition: Improved/Good       Assessment and Plan     1. Impacted cerumen of left ear    2. Tinnitus, bilateral      Reassured negative aural exam, no evidence of AOE or AOM.   Repeat audiogram in one year to monitor asymmetry.   Patient encouraged to return to clinic if symptoms worsen/persist and as needed for further ENT symptoms or concerns.          No follow-ups on file.

## 2024-06-10 ENCOUNTER — PATIENT MESSAGE (OUTPATIENT)
Dept: INTERNAL MEDICINE | Facility: CLINIC | Age: 59
End: 2024-06-10
Payer: COMMERCIAL

## 2024-06-23 RX ORDER — ATENOLOL 50 MG/1
75 TABLET ORAL
Qty: 135 TABLET | Refills: 3 | Status: SHIPPED | OUTPATIENT
Start: 2024-06-23

## 2024-06-23 NOTE — TELEPHONE ENCOUNTER
Refill Decision Note   Geo Lang  is requesting a refill authorization.  Brief Assessment and Rationale for Refill:  Approve     Medication Therapy Plan:         Comments:     Note composed:12:23 PM 06/23/2024             Appointments     Last Visit   5/10/2024 Pj Velasco MD   Next Visit   7/15/2024 Pj Velasco MD

## 2024-06-23 NOTE — TELEPHONE ENCOUNTER
No care due was identified.  [facility] Embedded Care Due Messages. Reference number: [id number].   6/23/2024 10:41:46 AM CDT

## 2024-06-30 ENCOUNTER — PATIENT MESSAGE (OUTPATIENT)
Dept: INTERNAL MEDICINE | Facility: CLINIC | Age: 59
End: 2024-06-30
Payer: COMMERCIAL

## 2024-07-01 RX ORDER — AMOXICILLIN AND CLAVULANATE POTASSIUM 875; 125 MG/1; MG/1
1 TABLET, FILM COATED ORAL 2 TIMES DAILY
Qty: 20 TABLET | Refills: 0 | Status: SHIPPED | OUTPATIENT
Start: 2024-07-01 | End: 2024-07-11

## 2024-07-01 NOTE — TELEPHONE ENCOUNTER
Pt seeking antibiotic for diverticulitis episode, reports lower abd pain, mild constipation x 1 week

## 2024-07-15 ENCOUNTER — OFFICE VISIT (OUTPATIENT)
Dept: INTERNAL MEDICINE | Facility: CLINIC | Age: 59
End: 2024-07-15
Payer: COMMERCIAL

## 2024-07-15 ENCOUNTER — PATIENT MESSAGE (OUTPATIENT)
Dept: INTERNAL MEDICINE | Facility: CLINIC | Age: 59
End: 2024-07-15

## 2024-07-15 ENCOUNTER — LAB VISIT (OUTPATIENT)
Dept: LAB | Facility: HOSPITAL | Age: 59
End: 2024-07-15
Attending: INTERNAL MEDICINE
Payer: COMMERCIAL

## 2024-07-15 VITALS
HEART RATE: 65 BPM | OXYGEN SATURATION: 96 % | SYSTOLIC BLOOD PRESSURE: 138 MMHG | WEIGHT: 253.31 LBS | BODY MASS INDEX: 35.46 KG/M2 | DIASTOLIC BLOOD PRESSURE: 88 MMHG | HEIGHT: 71 IN

## 2024-07-15 DIAGNOSIS — E11.9 TYPE 2 DIABETES MELLITUS WITHOUT COMPLICATION, WITHOUT LONG-TERM CURRENT USE OF INSULIN: ICD-10-CM

## 2024-07-15 DIAGNOSIS — K21.9 GASTROESOPHAGEAL REFLUX DISEASE WITHOUT ESOPHAGITIS: ICD-10-CM

## 2024-07-15 DIAGNOSIS — R11.0 NAUSEA: ICD-10-CM

## 2024-07-15 DIAGNOSIS — K57.30 DIVERTICULOSIS OF LARGE INTESTINE WITHOUT HEMORRHAGE: ICD-10-CM

## 2024-07-15 DIAGNOSIS — K57.92 ACUTE DIVERTICULITIS OF INTESTINE: Primary | ICD-10-CM

## 2024-07-15 DIAGNOSIS — I10 ESSENTIAL HYPERTENSION: Chronic | ICD-10-CM

## 2024-07-15 LAB
ESTIMATED AVG GLUCOSE: 214 MG/DL (ref 68–131)
HBA1C MFR BLD: 9.1 % (ref 4–5.6)

## 2024-07-15 PROCEDURE — 3052F HG A1C>EQUAL 8.0%<EQUAL 9.0%: CPT | Mod: CPTII,S$GLB,, | Performed by: INTERNAL MEDICINE

## 2024-07-15 PROCEDURE — 3008F BODY MASS INDEX DOCD: CPT | Mod: CPTII,S$GLB,, | Performed by: INTERNAL MEDICINE

## 2024-07-15 PROCEDURE — 1159F MED LIST DOCD IN RCRD: CPT | Mod: CPTII,S$GLB,, | Performed by: INTERNAL MEDICINE

## 2024-07-15 PROCEDURE — 99214 OFFICE O/P EST MOD 30 MIN: CPT | Mod: S$GLB,,, | Performed by: INTERNAL MEDICINE

## 2024-07-15 PROCEDURE — 36415 COLL VENOUS BLD VENIPUNCTURE: CPT | Performed by: INTERNAL MEDICINE

## 2024-07-15 PROCEDURE — 1160F RVW MEDS BY RX/DR IN RCRD: CPT | Mod: CPTII,S$GLB,, | Performed by: INTERNAL MEDICINE

## 2024-07-15 PROCEDURE — 4010F ACE/ARB THERAPY RXD/TAKEN: CPT | Mod: CPTII,S$GLB,, | Performed by: INTERNAL MEDICINE

## 2024-07-15 PROCEDURE — 99999 PR PBB SHADOW E&M-EST. PATIENT-LVL IV: CPT | Mod: PBBFAC,,, | Performed by: INTERNAL MEDICINE

## 2024-07-15 PROCEDURE — 3079F DIAST BP 80-89 MM HG: CPT | Mod: CPTII,S$GLB,, | Performed by: INTERNAL MEDICINE

## 2024-07-15 PROCEDURE — 83036 HEMOGLOBIN GLYCOSYLATED A1C: CPT | Performed by: INTERNAL MEDICINE

## 2024-07-15 PROCEDURE — 3075F SYST BP GE 130 - 139MM HG: CPT | Mod: CPTII,S$GLB,, | Performed by: INTERNAL MEDICINE

## 2024-07-15 PROCEDURE — 3072F LOW RISK FOR RETINOPATHY: CPT | Mod: CPTII,S$GLB,, | Performed by: INTERNAL MEDICINE

## 2024-07-15 RX ORDER — ONDANSETRON 8 MG/1
8 TABLET, ORALLY DISINTEGRATING ORAL 2 TIMES DAILY
Qty: 30 TABLET | Refills: 1 | Status: SHIPPED | OUTPATIENT
Start: 2024-07-15

## 2024-07-15 RX ORDER — CHLORTHALIDONE 25 MG/1
12.5 TABLET ORAL DAILY
Qty: 45 TABLET | Refills: 3 | Status: SHIPPED | OUTPATIENT
Start: 2024-07-15

## 2024-07-15 RX ORDER — SEMAGLUTIDE 0.68 MG/ML
0.5 INJECTION, SOLUTION SUBCUTANEOUS
Qty: 3 ML | Refills: 6 | Status: SHIPPED | OUTPATIENT
Start: 2024-07-15

## 2024-07-15 NOTE — PROGRESS NOTES
Subjective:       Patient ID: Geo Lang is a 58 y.o. male.    Chief Complaint: Follow-up    Significantly overweight patient comes in for follow-up diabetes and recent diverticulitis.  This is probably his 1st diverticulitis attack in a few years   He feels like he is clinically improved.  He is struggling with losing weight in his diabetes is not well-controlled.  He is willing to try 1 of the meds like Ozempic.  His last A1c was 8.3%        Review of Systems   Constitutional:  Positive for unexpected weight change.   Respiratory:  Negative for shortness of breath.    Cardiovascular:  Negative for chest pain.   Gastrointestinal:  Negative for abdominal pain (Improved with antibiotics).   Musculoskeletal:  Positive for arthralgias.           Past Medical History:   Diagnosis Date    Anxiety 10/02/2013    Chronic constipation     Corneal abrasion 20 yrs ago    ou from cls    Diabetes mellitus type I     Diverticulitis     GERD (gastroesophageal reflux disease)     HTN (hypertension) 10/02/2013    Hyperlipidemia     LPRD (laryngopharyngeal reflux disease) 12/10/2013    PONV (postoperative nausea and vomiting)     Sleep apnea     USES CPAP    Ventral hernia      Past Surgical History:   Procedure Laterality Date    COLONOSCOPY N/A 11/07/2015    Procedure: COLONOSCOPY;  Surgeon: Sanford Isidro MD;  Location: [address]);  Service: Endoscopy;  Laterality: N/A;    HERNIA REPAIR      LAPAROSCOPIC LYSIS OF ADHESIONS N/A 6/15/2022    Procedure: LYSIS, ADHESIONS, LAPAROSCOPIC;  Surgeon: Grant Fish MD;  Location: [facility];  Service: General;  Laterality: N/A;    LAPAROSCOPIC REPAIR OF VENTRAL HERNIA N/A 6/15/2022    Procedure: REPAIR, HERNIA, VENTRAL, LAPAROSCOPIC;  Surgeon: Grant Fish MD;  Location: [facility] OR;  Service: General;  Laterality: N/A;    nissen      REPAIR OF RECURRENT VENTRAL HERNIA N/A 10/30/2023    Procedure: REPAIR, HERNIA, VENTRAL, RECURRENT;  Surgeon: Sami Carrillo MD;   Location: [facility] OR;  Service: General;  Laterality: N/A;    ROBOT-ASSISTED LAPAROSCOPIC RESECTION OF SIGMOID COLON USING DA JOHN XI N/A 11/19/2020    Procedure: XI ROBOTIC COLECTOMY, SIGMOID - ATTEMPTED CONVERTED TO OPEN;  Surgeon: Grant Fish MD;  Location: [facility] OR;  Service: General;  Laterality: N/A;  ATTEMPTED - CONVERTED TO OPEN PROCEDURE      Patient Active Problem List   Diagnosis    Essential hypertension    Anxiety    LPRD (laryngopharyngeal reflux disease)    GERD (gastroesophageal reflux disease)    Obesity (BMI 30-39.9)    Pain in the chest    Type 2 diabetes mellitus without complication    HLD (hyperlipidemia)    CALVIN (obstructive sleep apnea)    Colon cancer screening    Diverticulosis of large intestine without hemorrhage    Dyspnea on exertion    Acute diverticulitis of intestine    Pneumatosis intestinalis of small intestine    Postoperative nausea    Ventral hernia with obstruction and without gangrene    Ventral hernia, recurrent        Objective:      Physical Exam  Constitutional:       General: He is not in acute distress.     Appearance: He is well-developed. He is obese.   HENT:      Head: Normocephalic and atraumatic.      Right Ear: Tympanic membrane, ear canal and external ear normal.      Left Ear: Tympanic membrane, ear canal and external ear normal.      Mouth/Throat:      Pharynx: No oropharyngeal exudate or posterior oropharyngeal erythema.   Eyes:      General: No scleral icterus.     Conjunctiva/sclera: Conjunctivae normal.      Pupils: Pupils are equal, round, and reactive to light.   Neck:      Thyroid: No thyromegaly.      Comments: No supraclavicular nodes palpated  Cardiovascular:      Rate and Rhythm: Normal rate and regular rhythm.      Pulses: Normal pulses.      Heart sounds: Normal heart sounds. No murmur heard.  Pulmonary:      Effort: Pulmonary effort is normal.      Breath sounds: Normal breath sounds. No wheezing.   Abdominal:      General: Bowel sounds are normal.  There is distension.      Palpations: Abdomen is soft. There is no mass.      Tenderness: There is no abdominal tenderness.   Musculoskeletal:         General: No tenderness.      Cervical back: Normal range of motion and neck supple.      Right lower leg: No edema.      Left lower leg: No edema.   Lymphadenopathy:      Cervical: No cervical adenopathy.   Skin:     Coloration: Skin is not jaundiced or pale.   Neurological:      General: No focal deficit present.      Mental Status: He is alert and oriented to person, place, and time.   Psychiatric:         Mood and Affect: Mood normal.         Behavior: Behavior normal.               Left upper back, see photo from 2015, stab  Assessment:       Problem List Items Addressed This Visit          Cardiac/Vascular    Essential hypertension (Chronic)    Relevant Medications    chlorthalidone (HYGROTEN) 25 MG Tab       Endocrine    Type 2 diabetes mellitus without complication (Chronic)    Relevant Medications    semaglutide (OZEMPIC) 0.25 mg or 0.5 mg (2 mg/3 mL) pen injector    Other Relevant Orders    Hemoglobin A1C       GI    GERD (gastroesophageal reflux disease)    Relevant Medications    ondansetron (ZOFRAN-ODT) 8 MG TbDL    Diverticulosis of large intestine without hemorrhage    Acute diverticulitis of intestine - Primary     Other Visit Diagnoses       Nausea        Relevant Medications    ondansetron (ZOFRAN-ODT) 8 MG TbDL            Plan:         Geo was seen today for follow-up.    Diagnoses and all orders for this visit:    Acute diverticulitis of intestine    Nausea  -     ondansetron (ZOFRAN-ODT) 8 MG TbDL; Take 1 tablet (8 mg total) by mouth 2 (two) times daily.    Gastroesophageal reflux disease without esophagitis  -     ondansetron (ZOFRAN-ODT) 8 MG TbDL; Take 1 tablet (8 mg total) by mouth 2 (two) times daily.    Essential hypertension  -     chlorthalidone (HYGROTEN) 25 MG Tab; Take 0.5 tablets (12.5 mg total) by mouth once daily.    Diverticulosis  "of large intestine without hemorrhage    Type 2 diabetes mellitus without complication, without long-term current use of insulin  -     Hemoglobin A1C; Future  -     semaglutide (OZEMPIC) 0.25 mg or 0.5 mg (2 mg/3 mL) pen injector; Inject 0.5 mg into the skin every 7 days.       We reviewed the photo from left upper back mole from 8 years ago and today.  No obvious change.  This was seen by Dermatology in the past and felt to be benign  Follow-up in 3-4 months              Portions of this note may have been created with voice recognition software. Occasional "wrong-word" or "sound-a-like" substitutions may have occurred due to the inherent limitations of voice recognition software. Please, read the note carefully and recognize, using context, where substitutions have occurred.  "

## 2024-07-19 ENCOUNTER — OFFICE VISIT (OUTPATIENT)
Dept: OPTOMETRY | Facility: CLINIC | Age: 59
End: 2024-07-19
Payer: COMMERCIAL

## 2024-07-19 DIAGNOSIS — E11.9 DIABETES MELLITUS TYPE 2 WITHOUT RETINOPATHY: Primary | ICD-10-CM

## 2024-07-19 DIAGNOSIS — H52.13 MYOPIA WITH PRESBYOPIA OF BOTH EYES: ICD-10-CM

## 2024-07-19 DIAGNOSIS — H52.4 MYOPIA WITH PRESBYOPIA OF BOTH EYES: ICD-10-CM

## 2024-07-19 PROCEDURE — 1159F MED LIST DOCD IN RCRD: CPT | Mod: CPTII,S$GLB,, | Performed by: OPTOMETRIST

## 2024-07-19 PROCEDURE — 1160F RVW MEDS BY RX/DR IN RCRD: CPT | Mod: CPTII,S$GLB,, | Performed by: OPTOMETRIST

## 2024-07-19 PROCEDURE — 3046F HEMOGLOBIN A1C LEVEL >9.0%: CPT | Mod: CPTII,S$GLB,, | Performed by: OPTOMETRIST

## 2024-07-19 PROCEDURE — 2023F DILAT RTA XM W/O RTNOPTHY: CPT | Mod: CPTII,S$GLB,, | Performed by: OPTOMETRIST

## 2024-07-19 PROCEDURE — 99999 PR PBB SHADOW E&M-EST. PATIENT-LVL III: CPT | Mod: PBBFAC,,, | Performed by: OPTOMETRIST

## 2024-07-19 PROCEDURE — 92015 DETERMINE REFRACTIVE STATE: CPT | Mod: S$GLB,,, | Performed by: OPTOMETRIST

## 2024-07-19 PROCEDURE — 4010F ACE/ARB THERAPY RXD/TAKEN: CPT | Mod: CPTII,S$GLB,, | Performed by: OPTOMETRIST

## 2024-07-19 PROCEDURE — 92014 COMPRE OPH EXAM EST PT 1/>: CPT | Mod: S$GLB,,, | Performed by: OPTOMETRIST

## 2024-07-19 RX ORDER — FLUTICASONE PROPIONATE 50 MCG
2 SPRAY, SUSPENSION (ML) NASAL
COMMUNITY

## 2024-07-19 NOTE — TELEPHONE ENCOUNTER
No care due was identified.  [facility] Embedded Care Due Messages. Reference number: [id number].   7/19/2024 6:28:11 PM CDT

## 2024-07-19 NOTE — PROGRESS NOTES
HPI    Pt here for annual diabetic exam. Last exam- 1 yr    Pt sts [facility] OU stable with specs (5yrsold). C/o floaters OU. Pt denies   flashes/pain. Pt denies use of gtt. BS was 170 this am.  Hemoglobin A1C       Date                     Value               Ref Range             Status                07/15/2024               9.1 (H)             4.0 - 5.6 %           Final                   05/10/2024               8.3 (H)             4.0 - 5.6 %           Final                  10/18/2023               7.8 (H)             0.0 - 5.6 %           Final                Last edited by Yesy Dwyer on 7/19/2024 10:17 AM.            Assessment /Plan     For exam results, see Encounter Report.    Diabetes mellitus type 2 without retinopathy    Myopia with presbyopia of both eyes      No diabetic retinopathy, no csme. Return in 1 year for dilated eye exam.  2. New Spectacle Rx given, discussed different options for glasses. RTC 1 year routine eye exam.

## 2024-07-20 NOTE — TELEPHONE ENCOUNTER
Refill Decision Note   Geo Lang  is requesting a refill authorization.  Brief Assessment and Rationale for Refill:  Approve     Medication Therapy Plan:         Comments:     Note composed:10:39 PM 07/19/2024

## 2024-07-25 ENCOUNTER — LAB VISIT (OUTPATIENT)
Dept: LAB | Facility: HOSPITAL | Age: 59
End: 2024-07-25
Attending: DERMATOLOGY
Payer: COMMERCIAL

## 2024-07-25 DIAGNOSIS — L23.9 ALLERGIC DERMATITIS: Primary | ICD-10-CM

## 2024-07-25 LAB
ALBUMIN SERPL BCP-MCNC: 4.4 G/DL (ref 3.5–5.2)
ALP SERPL-CCNC: 71 U/L (ref 55–135)
ALT SERPL W/O P-5'-P-CCNC: 42 U/L (ref 10–44)
ANION GAP SERPL CALC-SCNC: 15 MMOL/L (ref 8–16)
AST SERPL-CCNC: 30 U/L (ref 10–40)
BILIRUB SERPL-MCNC: 0.4 MG/DL (ref 0.1–1)
BUN SERPL-MCNC: 15 MG/DL (ref 6–20)
CALCIUM SERPL-MCNC: 10.7 MG/DL (ref 8.7–10.5)
CHLORIDE SERPL-SCNC: 106 MMOL/L (ref 95–110)
CO2 SERPL-SCNC: 21 MMOL/L (ref 23–29)
CREAT SERPL-MCNC: 1.1 MG/DL (ref 0.5–1.4)
EST. GFR  (NO RACE VARIABLE): >60 ML/MIN/1.73 M^2
GLUCOSE SERPL-MCNC: 119 MG/DL (ref 70–110)
POTASSIUM SERPL-SCNC: 4.1 MMOL/L (ref 3.5–5.1)
PROT SERPL-MCNC: 8.4 G/DL (ref 6–8.4)
SODIUM SERPL-SCNC: 142 MMOL/L (ref 136–145)

## 2024-07-25 PROCEDURE — 36415 COLL VENOUS BLD VENIPUNCTURE: CPT | Mod: PO | Performed by: DERMATOLOGY

## 2024-07-25 PROCEDURE — 80053 COMPREHEN METABOLIC PANEL: CPT | Performed by: DERMATOLOGY

## 2024-09-05 ENCOUNTER — OFFICE VISIT (OUTPATIENT)
Dept: INTERNAL MEDICINE | Facility: CLINIC | Age: 59
End: 2024-09-05
Payer: COMMERCIAL

## 2024-09-05 VITALS
HEART RATE: 71 BPM | OXYGEN SATURATION: 98 % | BODY MASS INDEX: 33.64 KG/M2 | SYSTOLIC BLOOD PRESSURE: 126 MMHG | HEIGHT: 71 IN | WEIGHT: 240.31 LBS | DIASTOLIC BLOOD PRESSURE: 78 MMHG

## 2024-09-05 DIAGNOSIS — K21.9 GASTROESOPHAGEAL REFLUX DISEASE WITHOUT ESOPHAGITIS: ICD-10-CM

## 2024-09-05 DIAGNOSIS — Z00.00 ROUTINE PHYSICAL EXAMINATION: Primary | ICD-10-CM

## 2024-09-05 DIAGNOSIS — L30.8: ICD-10-CM

## 2024-09-05 DIAGNOSIS — I10 ESSENTIAL HYPERTENSION: ICD-10-CM

## 2024-09-05 DIAGNOSIS — E11.9 TYPE 2 DIABETES MELLITUS WITHOUT COMPLICATION, WITHOUT LONG-TERM CURRENT USE OF INSULIN: ICD-10-CM

## 2024-09-05 PROCEDURE — 3078F DIAST BP <80 MM HG: CPT | Mod: CPTII,S$GLB,, | Performed by: INTERNAL MEDICINE

## 2024-09-05 PROCEDURE — 4010F ACE/ARB THERAPY RXD/TAKEN: CPT | Mod: CPTII,S$GLB,, | Performed by: INTERNAL MEDICINE

## 2024-09-05 PROCEDURE — 3074F SYST BP LT 130 MM HG: CPT | Mod: CPTII,S$GLB,, | Performed by: INTERNAL MEDICINE

## 2024-09-05 PROCEDURE — 1159F MED LIST DOCD IN RCRD: CPT | Mod: CPTII,S$GLB,, | Performed by: INTERNAL MEDICINE

## 2024-09-05 PROCEDURE — 1160F RVW MEDS BY RX/DR IN RCRD: CPT | Mod: CPTII,S$GLB,, | Performed by: INTERNAL MEDICINE

## 2024-09-05 PROCEDURE — 3046F HEMOGLOBIN A1C LEVEL >9.0%: CPT | Mod: CPTII,S$GLB,, | Performed by: INTERNAL MEDICINE

## 2024-09-05 PROCEDURE — 99396 PREV VISIT EST AGE 40-64: CPT | Mod: S$GLB,,, | Performed by: INTERNAL MEDICINE

## 2024-09-05 PROCEDURE — 3008F BODY MASS INDEX DOCD: CPT | Mod: CPTII,S$GLB,, | Performed by: INTERNAL MEDICINE

## 2024-09-05 PROCEDURE — 99999 PR PBB SHADOW E&M-EST. PATIENT-LVL IV: CPT | Mod: PBBFAC,,, | Performed by: INTERNAL MEDICINE

## 2024-09-05 RX ORDER — FLUTICASONE PROPIONATE 50 MCG
2 SPRAY, SUSPENSION (ML) NASAL DAILY PRN
Qty: 18.2 ML | Refills: 3 | Status: SHIPPED | OUTPATIENT
Start: 2024-09-05

## 2024-09-05 RX ORDER — SEMAGLUTIDE 1.34 MG/ML
1 INJECTION, SOLUTION SUBCUTANEOUS
Qty: 3 ML | Refills: 11 | Status: SHIPPED | OUTPATIENT
Start: 2024-09-05 | End: 2025-09-05

## 2024-09-05 NOTE — PROGRESS NOTES
Subjective:       Patient ID: Geo Lang is a 58 y.o. male.    Chief Complaint: Annual Exam    HPI:  Patient says he needs an annual physical for he is in a wellness  Pt was seen by me a few months ago and I coded a Wellness Physical at that visit but he says this needs to be an Annual Wellness Visit so I will code that again.  We reviewed his medical conditions.  Has diabetes and only recently started the Ozempic despite me prescribing it a months ago.  He has only taken about 3 doses including 2 at the 0.5 mg dose.  He does not fine that is causing significant changes to his appetite so he maybe interested in raising to the 1 mg.    He is otherwise doing well.  No abdominal symptoms.  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  He had eaten nose a few days before and was ultimately diagnosed with this.  He is not certain if it is considered an allergy they asked him to speak to his dermatologist about that.  He basically had scratcher clot shy like marks on his torso and extremities.  The dermatologist thought it was related to the mushrooms and biopsy confirmed it.    Review of Systems   Constitutional:  Negative for activity change and unexpected weight change.   HENT:  Negative for hearing loss, rhinorrhea and trouble swallowing.    Eyes:  Negative for discharge and visual disturbance.   Respiratory:  Negative for chest tightness and wheezing.    Cardiovascular:  Negative for chest pain and palpitations.   Gastrointestinal:  Negative for blood in stool, constipation, diarrhea and vomiting.   Endocrine: Negative for polydipsia.   Genitourinary:  Negative for difficulty urinating, hematuria and urgency.   Musculoskeletal:  Negative for arthralgias, joint swelling and neck pain.   Neurological:  Negative for weakness and headaches.   Psychiatric/Behavioral:  Negative for confusion and dysphoric mood.            Past Medical History:   Diagnosis Date    Anxiety  10/02/2013    Chronic constipation     Corneal abrasion 20 yrs ago    ou from cls    Diabetes mellitus type I     Diverticulitis     GERD (gastroesophageal reflux disease)     HTN (hypertension) 10/02/2013    Hyperlipidemia     LPRD (laryngopharyngeal reflux disease) 12/10/2013    PONV (postoperative nausea and vomiting)     Sleep apnea     USES CPAP    Ventral hernia      Past Surgical History:   Procedure Laterality Date    COLONOSCOPY N/A 11/07/2015    Procedure: COLONOSCOPY;  Surgeon: Sanford Isidro MD;  Location: [address]);  Service: Endoscopy;  Laterality: N/A;    HERNIA REPAIR      LAPAROSCOPIC LYSIS OF ADHESIONS N/A 6/15/2022    Procedure: LYSIS, ADHESIONS, LAPAROSCOPIC;  Surgeon: Grant Fish MD;  Location: [facility] OR;  Service: General;  Laterality: N/A;    LAPAROSCOPIC REPAIR OF VENTRAL HERNIA N/A 6/15/2022    Procedure: REPAIR, HERNIA, VENTRAL, LAPAROSCOPIC;  Surgeon: Grant Fish MD;  Location: [facility] OR;  Service: General;  Laterality: N/A;    nissen      REPAIR OF RECURRENT VENTRAL HERNIA N/A 10/30/2023    Procedure: REPAIR, HERNIA, VENTRAL, RECURRENT;  Surgeon: Sami Carrillo MD;  Location: [facility] OR;  Service: General;  Laterality: N/A;    ROBOT-ASSISTED LAPAROSCOPIC RESECTION OF SIGMOID COLON USING DA JOHN XI N/A 11/19/2020    Procedure: XI ROBOTIC COLECTOMY, SIGMOID - ATTEMPTED CONVERTED TO OPEN;  Surgeon: Grant Fish MD;  Location: [facility] OR;  Service: General;  Laterality: N/A;  ATTEMPTED - CONVERTED TO OPEN PROCEDURE      Patient Active Problem List   Diagnosis    Essential hypertension    Anxiety    LPRD (laryngopharyngeal reflux disease)    GERD (gastroesophageal reflux disease)    Obesity (BMI 30-39.9)    Pain in the chest    Type 2 diabetes mellitus without complication    HLD (hyperlipidemia)    CALVIN (obstructive sleep apnea)    Colon cancer screening    Diverticulosis of large intestine without hemorrhage    Dyspnea on exertion    Acute diverticulitis of intestine     Pneumatosis intestinalis of small intestine    Postoperative nausea    Ventral hernia with obstruction and without gangrene    Ventral hernia, recurrent    Flagellate dermatitis        Objective:      Physical Exam  Constitutional:       General: He is not in acute distress.     Appearance: He is well-developed. He is obese.   HENT:      Head: Normocephalic and atraumatic.      Right Ear: Tympanic membrane, ear canal and external ear normal.      Left Ear: Tympanic membrane, ear canal and external ear normal.      Mouth/Throat:      Pharynx: No oropharyngeal exudate or posterior oropharyngeal erythema.   Eyes:      General: No scleral icterus.     Conjunctiva/sclera: Conjunctivae normal.      Pupils: Pupils are equal, round, and reactive to light.   Neck:      Thyroid: No thyromegaly.      Comments: No supraclavicular nodes palpated  Cardiovascular:      Rate and Rhythm: Normal rate and regular rhythm.      Pulses: Normal pulses.      Heart sounds: Normal heart sounds. No murmur heard.  Pulmonary:      Effort: Pulmonary effort is normal.      Breath sounds: Normal breath sounds. No wheezing.   Abdominal:      General: Bowel sounds are normal.      Palpations: Abdomen is soft. There is no mass.      Tenderness: There is no abdominal tenderness.   Musculoskeletal:         General: No tenderness.      Cervical back: Normal range of motion and neck supple.      Right lower leg: No edema.      Left lower leg: No edema.   Lymphadenopathy:      Cervical: No cervical adenopathy.   Skin:     Coloration: Skin is not jaundiced or pale.   Neurological:      General: No focal deficit present.      Mental Status: He is alert and oriented to person, place, and time.   Psychiatric:         Mood and Affect: Mood normal.         Behavior: Behavior normal.         Assessment:       Problem List Items Addressed This Visit          Derm    Flagellate dermatitis       Cardiac/Vascular    Essential hypertension (Chronic)    Relevant  Orders    Lipid Panel    TSH    CBC Auto Differential    Basic Metabolic Panel    Hemoglobin A1C       Endocrine    Type 2 diabetes mellitus without complication (Chronic)    Relevant Medications    semaglutide (OZEMPIC) 1 mg/dose (2 mg/1.5 mL) PnIj    Other Relevant Orders    Lipid Panel    TSH    CBC Auto Differential    Basic Metabolic Panel    Hemoglobin A1C       GI    GERD (gastroesophageal reflux disease)    Relevant Orders    Lipid Panel    TSH    CBC Auto Differential    Basic Metabolic Panel    Hemoglobin A1C     Other Visit Diagnoses       Routine physical examination    -  Primary            Plan:         Geo was seen today for annual exam.    Diagnoses and all orders for this visit:    Routine physical examination    Essential hypertension  -     Lipid Panel; Future  -     TSH; Future  -     CBC Auto Differential; Future  -     Basic Metabolic Panel; Future  -     Hemoglobin A1C; Future    Type 2 diabetes mellitus without complication, without long-term current use of insulin  -     Lipid Panel; Future  -     TSH; Future  -     CBC Auto Differential; Future  -     Basic Metabolic Panel; Future  -     Hemoglobin A1C; Future    Gastroesophageal reflux disease without esophagitis  -     Lipid Panel; Future  -     TSH; Future  -     CBC Auto Differential; Future  -     Basic Metabolic Panel; Future  -     Hemoglobin A1C; Future    Flagellate dermatitis  Comments:  Shiitake Mushroom Dermatitis    Other orders  -     fluticasone propionate (FLONASE) 50 mcg/actuation nasal spray; 2 sprays (100 mcg total) by Each Nostril route daily as needed for Rhinitis.  -     semaglutide (OZEMPIC) 1 mg/dose (2 mg/1.5 mL) PnIj; Inject 1 mg into the skin every 7 days.     Labs a little later in the year after he has been on the Ozempic Longer      As this patient's PCP, I am actively managing and/or treating their chronic medical conditions including Diabetes and have been for at least 1 year. This includes, but is not  "limited to, medication management, coordination of care, documentation review from their specialists and labs/imaging review where pertinent.            Portions of this note may have been created with voice recognition software. Occasional "wrong-word" or "sound-a-like" substitutions may have occurred due to the inherent limitations of voice recognition software. Please, read the note carefully and recognize, using context, where substitutions have occurred.  "

## 2024-09-23 DIAGNOSIS — I10 ESSENTIAL HYPERTENSION: Chronic | ICD-10-CM

## 2024-09-23 NOTE — TELEPHONE ENCOUNTER
No care due was identified.  [facility] [place] Embedded Care Due Messages. Reference number: [id number].   9/23/2024 5:22:26 PM CDT

## 2024-09-24 RX ORDER — VALSARTAN 320 MG/1
320 TABLET ORAL DAILY
Qty: 90 TABLET | Refills: 3 | Status: SHIPPED | OUTPATIENT
Start: 2024-09-24

## 2024-09-24 NOTE — TELEPHONE ENCOUNTER
Refill Decision Note   Geo Lang  is requesting a refill authorization.  Brief Assessment and Rationale for Refill:  Approve     Medication Therapy Plan:         Comments:     Note composed:11:01 AM 09/24/2024             Cigarettes

## 2024-10-25 ENCOUNTER — PATIENT MESSAGE (OUTPATIENT)
Dept: INTERNAL MEDICINE | Facility: CLINIC | Age: 59
End: 2024-10-25
Payer: COMMERCIAL

## 2024-11-05 ENCOUNTER — LAB VISIT (OUTPATIENT)
Dept: LAB | Facility: HOSPITAL | Age: 59
End: 2024-11-05
Attending: INTERNAL MEDICINE
Payer: COMMERCIAL

## 2024-11-05 DIAGNOSIS — I10 ESSENTIAL HYPERTENSION: ICD-10-CM

## 2024-11-05 DIAGNOSIS — K21.9 GASTROESOPHAGEAL REFLUX DISEASE WITHOUT ESOPHAGITIS: ICD-10-CM

## 2024-11-05 DIAGNOSIS — E11.9 TYPE 2 DIABETES MELLITUS WITHOUT COMPLICATION, WITHOUT LONG-TERM CURRENT USE OF INSULIN: ICD-10-CM

## 2024-11-05 LAB
ANION GAP SERPL CALC-SCNC: 12 MMOL/L (ref 8–16)
BASOPHILS # BLD AUTO: 0.05 K/UL (ref 0–0.2)
BASOPHILS NFR BLD: 0.6 % (ref 0–1.9)
BUN SERPL-MCNC: 15 MG/DL (ref 6–20)
CALCIUM SERPL-MCNC: 9.3 MG/DL (ref 8.7–10.5)
CHLORIDE SERPL-SCNC: 105 MMOL/L (ref 95–110)
CHOLEST SERPL-MCNC: 97 MG/DL (ref 120–199)
CHOLEST/HDLC SERPL: 2.6 {RATIO} (ref 2–5)
CO2 SERPL-SCNC: 21 MMOL/L (ref 23–29)
CREAT SERPL-MCNC: 0.9 MG/DL (ref 0.5–1.4)
DIFFERENTIAL METHOD BLD: NORMAL
EOSINOPHIL # BLD AUTO: 0.2 K/UL (ref 0–0.5)
EOSINOPHIL NFR BLD: 2.6 % (ref 0–8)
ERYTHROCYTE [DISTWIDTH] IN BLOOD BY AUTOMATED COUNT: 13.8 % (ref 11.5–14.5)
EST. GFR  (NO RACE VARIABLE): >60 ML/MIN/1.73 M^2
ESTIMATED AVG GLUCOSE: 131 MG/DL (ref 68–131)
GLUCOSE SERPL-MCNC: 92 MG/DL (ref 70–110)
HBA1C MFR BLD: 6.2 % (ref 4–5.6)
HCT VFR BLD AUTO: 47.9 % (ref 40–54)
HDLC SERPL-MCNC: 37 MG/DL (ref 40–75)
HDLC SERPL: 38.1 % (ref 20–50)
HGB BLD-MCNC: 15.7 G/DL (ref 14–18)
IMM GRANULOCYTES # BLD AUTO: 0.03 K/UL (ref 0–0.04)
IMM GRANULOCYTES NFR BLD AUTO: 0.4 % (ref 0–0.5)
LDLC SERPL CALC-MCNC: 45.8 MG/DL (ref 63–159)
LYMPHOCYTES # BLD AUTO: 2.3 K/UL (ref 1–4.8)
LYMPHOCYTES NFR BLD: 29.8 % (ref 18–48)
MCH RBC QN AUTO: 29.1 PG (ref 27–31)
MCHC RBC AUTO-ENTMCNC: 32.8 G/DL (ref 32–36)
MCV RBC AUTO: 89 FL (ref 82–98)
MONOCYTES # BLD AUTO: 0.8 K/UL (ref 0.3–1)
MONOCYTES NFR BLD: 10.3 % (ref 4–15)
NEUTROPHILS # BLD AUTO: 4.4 K/UL (ref 1.8–7.7)
NEUTROPHILS NFR BLD: 56.3 % (ref 38–73)
NONHDLC SERPL-MCNC: 60 MG/DL
NRBC BLD-RTO: 0 /100 WBC
PLATELET # BLD AUTO: 276 K/UL (ref 150–450)
PMV BLD AUTO: 10.6 FL (ref 9.2–12.9)
POTASSIUM SERPL-SCNC: 3.5 MMOL/L (ref 3.5–5.1)
RBC # BLD AUTO: 5.39 M/UL (ref 4.6–6.2)
SODIUM SERPL-SCNC: 138 MMOL/L (ref 136–145)
TRIGL SERPL-MCNC: 71 MG/DL (ref 30–150)
TSH SERPL DL<=0.005 MIU/L-ACNC: 1.65 UIU/ML (ref 0.4–4)
WBC # BLD AUTO: 7.74 K/UL (ref 3.9–12.7)

## 2024-11-05 PROCEDURE — 83036 HEMOGLOBIN GLYCOSYLATED A1C: CPT | Performed by: INTERNAL MEDICINE

## 2024-11-05 PROCEDURE — 36415 COLL VENOUS BLD VENIPUNCTURE: CPT | Mod: PO | Performed by: INTERNAL MEDICINE

## 2024-11-05 PROCEDURE — 80048 BASIC METABOLIC PNL TOTAL CA: CPT | Performed by: INTERNAL MEDICINE

## 2024-11-05 PROCEDURE — 80061 LIPID PANEL: CPT | Performed by: INTERNAL MEDICINE

## 2024-11-05 PROCEDURE — 85025 COMPLETE CBC W/AUTO DIFF WBC: CPT | Performed by: INTERNAL MEDICINE

## 2024-11-05 PROCEDURE — 84443 ASSAY THYROID STIM HORMONE: CPT | Performed by: INTERNAL MEDICINE

## 2024-11-08 ENCOUNTER — HOSPITAL ENCOUNTER (OUTPATIENT)
Dept: CARDIOLOGY | Facility: CLINIC | Age: 59
Discharge: HOME | End: 2024-11-08
Payer: COMMERCIAL

## 2024-11-08 ENCOUNTER — OFFICE VISIT (OUTPATIENT)
Dept: INTERNAL MEDICINE | Facility: CLINIC | Age: 59
End: 2024-11-08
Payer: COMMERCIAL

## 2024-11-08 ENCOUNTER — HOSPITAL ENCOUNTER (OUTPATIENT)
Dept: CARDIOLOGY | Facility: HOSPITAL | Age: 59
Discharge: HOME | End: 2024-11-08
Attending: INTERNAL MEDICINE
Payer: COMMERCIAL

## 2024-11-08 VITALS
HEART RATE: 77 BPM | BODY MASS INDEX: 32.01 KG/M2 | OXYGEN SATURATION: 97 % | HEIGHT: 71 IN | SYSTOLIC BLOOD PRESSURE: 134 MMHG | WEIGHT: 228.63 LBS | DIASTOLIC BLOOD PRESSURE: 80 MMHG

## 2024-11-08 VITALS — HEIGHT: 71 IN | WEIGHT: 228 LBS | BODY MASS INDEX: 31.92 KG/M2

## 2024-11-08 DIAGNOSIS — E11.9 TYPE 2 DIABETES MELLITUS WITHOUT COMPLICATION, WITHOUT LONG-TERM CURRENT USE OF INSULIN: Chronic | ICD-10-CM

## 2024-11-08 DIAGNOSIS — I10 ESSENTIAL HYPERTENSION: Primary | Chronic | ICD-10-CM

## 2024-11-08 DIAGNOSIS — I10 ESSENTIAL HYPERTENSION: Chronic | ICD-10-CM

## 2024-11-08 DIAGNOSIS — R07.9 CHEST PAIN, UNSPECIFIED TYPE: ICD-10-CM

## 2024-11-08 LAB
CV STRESS BASE HR: 72 BPM
DIASTOLIC BLOOD PRESSURE: 76 MMHG
OHS CV CPX 1 MINUTE RECOVERY HEART RATE: 122 BPM
OHS CV CPX 85 PERCENT MAX PREDICTED HEART RATE MALE: 137
OHS CV CPX ESTIMATED METS: 15
OHS CV CPX MAX PREDICTED HEART RATE: 161
OHS CV CPX PATIENT IS FEMALE: 0
OHS CV CPX PATIENT IS MALE: 1
OHS CV CPX PEAK DIASTOLIC BLOOD PRESSURE: 88 MMHG
OHS CV CPX PEAK HEAR RATE: 148 BPM
OHS CV CPX PEAK RATE PRESSURE PRODUCT: NORMAL
OHS CV CPX PEAK SYSTOLIC BLOOD PRESSURE: 186 MMHG
OHS CV CPX PERCENT MAX PREDICTED HEART RATE ACHIEVED: 92
OHS CV CPX RATE PRESSURE PRODUCT PRESENTING: 8568
STRESS ECHO POST EXERCISE DUR MIN: 8 MINUTES
STRESS ECHO POST EXERCISE DUR SEC: 29 SECONDS
SYSTOLIC BLOOD PRESSURE: 119 MMHG

## 2024-11-08 PROCEDURE — 93017 CV STRESS TEST TRACING ONLY: CPT

## 2024-11-08 PROCEDURE — 93016 CV STRESS TEST SUPVJ ONLY: CPT | Mod: ,,, | Performed by: STUDENT IN AN ORGANIZED HEALTH CARE EDUCATION/TRAINING PROGRAM

## 2024-11-08 PROCEDURE — 93018 CV STRESS TEST I&R ONLY: CPT | Mod: ,,, | Performed by: STUDENT IN AN ORGANIZED HEALTH CARE EDUCATION/TRAINING PROGRAM

## 2024-11-08 PROCEDURE — 99999 PR PBB SHADOW E&M-EST. PATIENT-LVL IV: CPT | Mod: PBBFAC,,, | Performed by: INTERNAL MEDICINE

## 2024-11-08 RX ORDER — ATENOLOL 50 MG/1
50 TABLET ORAL DAILY
Start: 2024-11-08

## 2024-11-08 RX ORDER — ATORVASTATIN CALCIUM 10 MG/1
10 TABLET, FILM COATED ORAL DAILY
Qty: 90 TABLET | Refills: 3 | Status: SHIPPED | OUTPATIENT
Start: 2024-11-08

## 2024-11-08 NOTE — PROGRESS NOTES
Hypertension      History of Present Illness    CHIEF COMPLAINT:  Geo presents to discuss recent lab results and reports experiencing chest pain and dizziness.    HPI:  Geo reports chest pain and dizziness. The chest pain occurred while driving, lasted approximately 1 minute, and was localized to the chest area, potentially radiating to the shoulder. Geo had pre-syncope when changing positions from leaning down to standing upright. Pain in the side and shoulder is also reported.    Geo's blood pressure has been low recently, with readings around 112. Home blood pressure readings have mostly been in the 120/70s range. Geo discontinued chlorthalidone 3 days ago due to concerns about hypotension. He also reduced his atenolol dose, discontinuing the extra half tablet.    Geo reports weight loss of 12 lbs in the last 2 months, attributed to decreased appetite due to 12-hour workdays. His glucose levels have improved significantly, with A1C decreasing from 9 to 6.    Geo occasionally forgets to take some evening medications. He was taking stool softeners for constipation but recently stopped due to concerns about dehydration. Geo began drinking Powerade for hydration after becoming dizzy while working in the garage.    Geo denies any  shortness of breath.    MEDICATIONS:  Meds reviewed.     MEDICAL HISTORY:  Geo has a history of diabetes, hypertension, and diverticulosis.    TEST RESULTS:  Geo's recent lab results show low CO2 (23) and glucose level of 92. His blood count and thyroid function are within normal limits. Calcium levels, previously elevated, are now within the normal range. His A1C has improved from 9 to 6. Geo's cholesterol is recently reported as below 100, which is considered low. Geo underwent an EKG about 1 year ago. He also had a stress test performed several years ago.    SOCIAL HISTORY:  Geo works 12-hour days.      ROS:  General: +weight loss, -change in weight  Cardiovascular: +chest  pain  Respiratory: -shortness of breath  Gastrointestinal: -abdominal pain, +constipation  Neurological: -headache, +dizziness             Review of Systems    Past Medical History:   Diagnosis Date    Anxiety 10/02/2013    Chronic constipation     Corneal abrasion 20 yrs ago    ou from cls    Diabetes mellitus type I     Diverticulitis     GERD (gastroesophageal reflux disease)     HTN (hypertension) 10/02/2013    Hyperlipidemia     LPRD (laryngopharyngeal reflux disease) 12/10/2013    PONV (postoperative nausea and vomiting)     Sleep apnea     USES CPAP    Ventral hernia      Past Surgical History:   Procedure Laterality Date    COLONOSCOPY N/A 11/07/2015    Procedure: COLONOSCOPY;  Surgeon: Sanford Isidro MD;  Location: [address]);  Service: Endoscopy;  Laterality: N/A;    HERNIA REPAIR      LAPAROSCOPIC LYSIS OF ADHESIONS N/A 6/15/2022    Procedure: LYSIS, ADHESIONS, LAPAROSCOPIC;  Surgeon: Grant Fish MD;  Location: [facility] OR;  Service: General;  Laterality: N/A;    LAPAROSCOPIC REPAIR OF VENTRAL HERNIA N/A 6/15/2022    Procedure: REPAIR, HERNIA, VENTRAL, LAPAROSCOPIC;  Surgeon: Grant Fish MD;  Location: [facility] OR;  Service: General;  Laterality: N/A;    nissen      REPAIR OF RECURRENT VENTRAL HERNIA N/A 10/30/2023    Procedure: REPAIR, HERNIA, VENTRAL, RECURRENT;  Surgeon: Sami Carrillo MD;  Location: [facility] OR;  Service: General;  Laterality: N/A;    ROBOT-ASSISTED LAPAROSCOPIC RESECTION OF SIGMOID COLON USING DA JOHN XI N/A 11/19/2020    Procedure: XI ROBOTIC COLECTOMY, SIGMOID - ATTEMPTED CONVERTED TO OPEN;  Surgeon: Grant Fish MD;  Location: [facility] OR;  Service: General;  Laterality: N/A;  ATTEMPTED - CONVERTED TO OPEN PROCEDURE      Patient Active Problem List   Diagnosis    Essential hypertension    Anxiety    LPRD (laryngopharyngeal reflux disease)    GERD (gastroesophageal reflux disease)    Obesity (BMI 30-39.9)    Pain in the chest    Type 2 diabetes mellitus without  complication    HLD (hyperlipidemia)    CALVIN (obstructive sleep apnea)    Colon cancer screening    Diverticulosis of large intestine without hemorrhage    Dyspnea on exertion    Acute diverticulitis of intestine    Pneumatosis intestinalis of small intestine    Postoperative nausea    Ventral hernia with obstruction and without gangrene    Ventral hernia, recurrent    Flagellate dermatitis          Objective:      Physical Exam    Vitals: Blood pressure: 134/80. Current weight: 228 lbs.       Physical Exam  Constitutional:       General: He is not in acute distress.     Appearance: He is well-developed. He is obese.   HENT:      Head: Normocephalic and atraumatic.      Right Ear: Tympanic membrane, ear canal and external ear normal.      Left Ear: Tympanic membrane, ear canal and external ear normal.      Mouth/Throat:      Pharynx: No oropharyngeal exudate or posterior oropharyngeal erythema.   Eyes:      General: No scleral icterus.     Conjunctiva/sclera: Conjunctivae normal.      Pupils: Pupils are equal, round, and reactive to light.   Neck:      Thyroid: No thyromegaly.      Comments: No supraclavicular nodes palpated  Cardiovascular:      Rate and Rhythm: Normal rate and regular rhythm.      Pulses: Normal pulses.      Heart sounds: Normal heart sounds. No murmur heard.  Pulmonary:      Effort: Pulmonary effort is normal.      Breath sounds: Normal breath sounds. No wheezing.   Abdominal:      General: Bowel sounds are normal.      Palpations: Abdomen is soft. There is no mass.      Tenderness: There is no abdominal tenderness.   Musculoskeletal:         General: No tenderness.      Cervical back: Normal range of motion and neck supple.      Right lower leg: No edema.      Left lower leg: No edema.   Lymphadenopathy:      Cervical: No cervical adenopathy.   Skin:     Coloration: Skin is not jaundiced or pale.   Neurological:      General: No focal deficit present.      Mental Status: He is alert and oriented  "to person, place, and time.   Psychiatric:         Mood and Affect: Mood normal.         Behavior: Behavior normal.           Assessment:       Problem List Items Addressed This Visit          Cardiac/Vascular    Essential hypertension - Primary (Chronic)    Relevant Orders    EKG 12-lead    Exercise Stress - EKG    Pain in the chest    Relevant Orders    EKG 12-lead    Exercise Stress - EKG       Endocrine    Type 2 diabetes mellitus without complication (Chronic)    Relevant Medications    atorvastatin (LIPITOR) 10 MG tablet    Other Relevant Orders    EKG 12-lead    Exercise Stress - EKG       Plan:       Geo was seen today for hypertension.    Diagnoses and all orders for this visit:    Essential hypertension  -     EKG 12-lead  -     Exercise Stress - EKG; Future    Type 2 diabetes mellitus without complication, without long-term current use of insulin  -     atorvastatin (LIPITOR) 10 MG tablet; Take 1 tablet (10 mg total) by mouth once daily.  -     EKG 12-lead  -     Exercise Stress - EKG; Future    Chest pain, unspecified type  -     EKG 12-lead  -     Exercise Stress - EKG; Future    Other orders  -     atenoloL (TENORMIN) 50 MG tablet; Take 1 tablet (50 mg total) by mouth once daily.         Review studies    As this patient's PCP, I am actively managing and/or treating their chronic medical conditions including Diabetes, HTN, Obesity and have been for at least 1 year. This includes, but is not limited to, medication management, coordination of care, documentation review from their specialists and labs/imaging review where pertinent.      Portions of this note may have been created with [employer] voice recognition software. Occasional "wrong-word" or "sound-a-like" substitutions may have occurred due to the inherent limitations of voice recognition software. Please, read the note carefully and recognize, using context, where substitutions have occurred.  "

## 2024-11-09 LAB
OHS QRS DURATION: 98 MS
OHS QTC CALCULATION: 434 MS

## 2024-11-11 ENCOUNTER — PATIENT MESSAGE (OUTPATIENT)
Dept: INTERNAL MEDICINE | Facility: CLINIC | Age: 59
End: 2024-11-11
Payer: COMMERCIAL

## 2024-11-21 DIAGNOSIS — K21.9 GASTROESOPHAGEAL REFLUX DISEASE WITHOUT ESOPHAGITIS: ICD-10-CM

## 2024-11-21 RX ORDER — ESOMEPRAZOLE MAGNESIUM 40 MG/1
40 CAPSULE, DELAYED RELEASE ORAL
Qty: 30 CAPSULE | Refills: 3 | Status: SHIPPED | OUTPATIENT
Start: 2024-11-21 | End: 2025-11-21

## 2024-12-06 ENCOUNTER — OFFICE VISIT (OUTPATIENT)
Dept: PODIATRY | Facility: CLINIC | Age: 59
End: 2024-12-06
Payer: COMMERCIAL

## 2024-12-06 VITALS — WEIGHT: 226 LBS | HEIGHT: 71 IN | BODY MASS INDEX: 31.64 KG/M2

## 2024-12-06 DIAGNOSIS — L84 CORN OR CALLUS: ICD-10-CM

## 2024-12-06 DIAGNOSIS — M20.11 VALGUS DEFORMITY OF BOTH GREAT TOES: ICD-10-CM

## 2024-12-06 DIAGNOSIS — E11.9 TYPE 2 DIABETES MELLITUS WITHOUT COMPLICATION, WITHOUT LONG-TERM CURRENT USE OF INSULIN: Primary | ICD-10-CM

## 2024-12-06 DIAGNOSIS — M21.622 TAILOR'S BUNION OF BOTH FEET: ICD-10-CM

## 2024-12-06 DIAGNOSIS — M20.12 VALGUS DEFORMITY OF BOTH GREAT TOES: ICD-10-CM

## 2024-12-06 DIAGNOSIS — M21.621 TAILOR'S BUNION OF BOTH FEET: ICD-10-CM

## 2024-12-06 PROCEDURE — 99999 PR PBB SHADOW E&M-EST. PATIENT-LVL III: CPT | Mod: PBBFAC,,, | Performed by: PODIATRIST

## 2024-12-06 NOTE — PROGRESS NOTES
Subjective:      Patient ID: Geo Lang is a 59 y.o. male.    Chief Complaint: Diabetic Foot Exam      Geo is a 59 y.o. male who presents to the clinic for evaluation and treatment of diabetic feet. Geo has a past medical history of Anxiety (10/02/2013), Chronic constipation, Corneal abrasion (20 yrs ago), Diabetes mellitus type I, Diverticulitis, GERD (gastroesophageal reflux disease), HTN (hypertension) (10/02/2013), Hyperlipidemia, LPRD (laryngopharyngeal reflux disease) (12/10/2013), PONV (postoperative nausea and vomiting), Sleep apnea, and Ventral hernia. Patient relates no major problem with feet.  Relates occasional neuropathy symptoms along the Lt. Peroneal muscle bellies, however, states this occurs sporadically and resolves on its own.  Denies pain in the extremity with weight bearing.  Notes continued control over his blood glucose.  Denies any additional pedal complaints.    PCP: Pj Velasco MD    Date Last Seen by PCP: 11/24    Hemoglobin A1C   Date Value Ref Range Status   11/05/2024 6.2 (H) 4.0 - 5.6 % Final     Comment:     ADA Screening Guidelines:  5.7-6.4%  Consistent with prediabetes  >or=6.5%  Consistent with diabetes    High levels of fetal hemoglobin interfere with the HbA1C  assay. Heterozygous hemoglobin variants (HbS, HgC, etc)do  not significantly interfere with this assay.   However, presence of multiple variants may affect accuracy.     07/15/2024 9.1 (H) 4.0 - 5.6 % Final     Comment:     ADA Screening Guidelines:  5.7-6.4%  Consistent with prediabetes  >or=6.5%  Consistent with diabetes    High levels of fetal hemoglobin interfere with the HbA1C  assay. Heterozygous hemoglobin variants (HbS, HgC, etc)do  not significantly interfere with this assay.   However, presence of multiple variants may affect accuracy.     05/10/2024 8.3 (H) 4.0 - 5.6 % Final     Comment:     ADA Screening Guidelines:  5.7-6.4%  Consistent with prediabetes  >or=6.5%  Consistent with  diabetes    High levels of fetal hemoglobin interfere with the HbA1C  assay. Heterozygous hemoglobin variants (HbS, HgC, etc)do  not significantly interfere with this assay.   However, presence of multiple variants may affect accuracy.             Past Medical History:   Diagnosis Date    Anxiety 10/02/2013    Chronic constipation     Corneal abrasion 20 yrs ago    ou from cls    Diabetes mellitus type I     Diverticulitis     GERD (gastroesophageal reflux disease)     HTN (hypertension) 10/02/2013    Hyperlipidemia     LPRD (laryngopharyngeal reflux disease) 12/10/2013    PONV (postoperative nausea and vomiting)     Sleep apnea     USES CPAP    Ventral hernia        Past Surgical History:   Procedure Laterality Date    COLONOSCOPY N/A 11/07/2015    Procedure: COLONOSCOPY;  Surgeon: Sanford Isidro MD;  Location: [address]);  Service: Endoscopy;  Laterality: N/A;    HERNIA REPAIR      LAPAROSCOPIC LYSIS OF ADHESIONS N/A 6/15/2022    Procedure: LYSIS, ADHESIONS, LAPAROSCOPIC;  Surgeon: Grant Fish MD;  Location: [facility] OR;  Service: General;  Laterality: N/A;    LAPAROSCOPIC REPAIR OF VENTRAL HERNIA N/A 6/15/2022    Procedure: REPAIR, HERNIA, VENTRAL, LAPAROSCOPIC;  Surgeon: Grant Fish MD;  Location: [facility] OR;  Service: General;  Laterality: N/A;    nissen      REPAIR OF RECURRENT VENTRAL HERNIA N/A 10/30/2023    Procedure: REPAIR, HERNIA, VENTRAL, RECURRENT;  Surgeon: Sami Carrillo MD;  Location: [facility] OR;  Service: General;  Laterality: N/A;    ROBOT-ASSISTED LAPAROSCOPIC RESECTION OF SIGMOID COLON USING DA JOHN XI N/A 11/19/2020    Procedure: XI ROBOTIC COLECTOMY, SIGMOID - ATTEMPTED CONVERTED TO OPEN;  Surgeon: Grant Fish MD;  Location: [facility] OR;  Service: General;  Laterality: N/A;  ATTEMPTED - CONVERTED TO OPEN PROCEDURE       Family History   Problem Relation Name Age of Onset    Hypertension Father      Hyperlipidemia Father      Heart failure Mother 65     Diabetes Mother 65      Hypertension Sister      Diabetes Sister      Hypertension Brother      Diabetes Brother      Hypertension Sister      No Known Problems Maternal Aunt      No Known Problems Maternal Uncle      No Known Problems Paternal Aunt      No Known Problems Paternal Uncle      No Known Problems Maternal Grandmother      No Known Problems Maternal Grandfather      No Known Problems Paternal Grandmother      No Known Problems Paternal Grandfather      Prostate cancer Cousin      Melanoma Neg Hx      Amblyopia Neg Hx      Blindness Neg Hx      Cancer Neg Hx      Cataracts Neg Hx      Glaucoma Neg Hx      Macular degeneration Neg Hx      Retinal detachment Neg Hx      Strabismus Neg Hx      Stroke Neg Hx      Thyroid disease Neg Hx      Colon cancer Neg Hx      Esophageal cancer Neg Hx         Social History     Socioeconomic History    Marital status: Single   Tobacco Use    Smoking status: Former     Current packs/day: 0.00     Average packs/day: 1 pack/day for 15.0 years (15.0 ttl pk-yrs)     Types: Cigarettes     Start date: 3/15/1998     Quit date: 3/15/2013     Years since quittin.7    Smokeless tobacco: Never   Substance and Sexual Activity    Alcohol use: Yes     Comment: OCC    Drug use: No     Social Drivers of Health     Financial Resource Strain: Low Risk  (2024)    Overall Financial Resource Strain (CARDIA)     Difficulty of Paying Living Expenses: Not hard at all   Food Insecurity: No Food Insecurity (2024)    Hunger Vital Sign     Worried About Running Out of Food in the Last Year: Never true     Ran Out of Food in the Last Year: Never true   Transportation Needs: No Transportation Needs (10/31/2023)    PRAPARE - Transportation     Lack of Transportation (Medical): No     Lack of Transportation (Non-Medical): No   Physical Activity: Unknown (2024)    Exercise Vital Sign     Days of Exercise per Week: 3 days   Stress: No Stress Concern Present (2024)    Citizen of [place] [employer]  - Occupational Stress Questionnaire     Feeling of Stress : Not at all   Housing Stability: Low Risk  (10/31/2023)    Housing Stability Vital Sign     Unable to Pay for Housing in the Last Year: No     Number of Places Lived in the Last Year: 1     Unstable Housing in the Last Year: No       Current Outpatient Medications   Medication Sig Dispense Refill    ALPRAZolam (XANAX) 1 MG tablet TAKE 1 TABLET BY MOUTH ONCE DAILY AS NEEDED FOR INSOMNIA 30 tablet 0    atenoloL (TENORMIN) 50 MG tablet Take 1 tablet (50 mg total) by mouth once daily.      atorvastatin (LIPITOR) 10 MG tablet Take 1 tablet (10 mg total) by mouth once daily. 90 tablet 3    blood sugar diagnostic (TRUE METRIX GLUCOSE TEST STRIP) Strp use as directed to test 2 times daily 200 each 3    docusate sodium (COLACE) 100 MG capsule Take 300 mg by mouth once daily.      empagliflozin-metformin (SYNJARDY) 5-1,000 mg Tab Take 1 tablet by mouth 2 (two) times daily. 180 tablet 12    esomeprazole (NEXIUM) 40 MG capsule Take 1 capsule (40 mg total) by mouth before breakfast. 30 capsule 3    fluticasone propionate (FLONASE) 50 mcg/actuation nasal spray 2 sprays (100 mcg total) by Each Nostril route daily as needed for Rhinitis. 18.2 mL 3    lancets Misc To check BG 2 times daily, to use with insurance preferred meter 100 each 1    mupirocin (BACTROBAN) 2 % ointment Apply topically 3 (three) times daily. 30 g 25    ondansetron (ZOFRAN-ODT) 8 MG TbDL Take 1 tablet (8 mg total) by mouth 2 (two) times daily. 30 tablet 1    semaglutide (OZEMPIC) 1 mg/dose (2 mg/1.5 mL) PnIj Inject 1 mg into the skin every 7 days. 3 mL 11    valsartan (DIOVAN) 320 MG tablet Take 1 tablet by mouth once daily 90 tablet 3    blood-glucose meter kit To check BG 2 times daily, to use with insurance preferred meter 1 each 1     No current facility-administered medications for this visit.       Review of patient's allergies indicates:  No Known Allergies      Review of Systems    Constitutional: Negative for chills and fever.   Cardiovascular:  Negative for claudication and leg swelling.   Skin:  Negative for color change and nail changes.   Musculoskeletal:  Negative for joint pain, joint swelling, muscle cramps and muscle weakness.   Neurological:  Negative for numbness and paresthesias.   Psychiatric/Behavioral:  Negative for altered mental status.            Objective:      Physical Exam  Constitutional:       General: He is not in acute distress.     Appearance: He is well-developed. He is not diaphoretic.   Cardiovascular:      Pulses:           Dorsalis pedis pulses are 2+ on the right side and 2+ on the left side.        Posterior tibial pulses are 2+ on the right side and 2+ on the left side.      Comments: CFT is < 3 seconds bilateral.  Pedal hair growth is present bilateral.  No varicosities noted bilateral.  No lower extremity edema noted bilateral.  Toes are warm to touch bilateral.    Musculoskeletal:         General: No tenderness.      Comments: Muscle strength 5/5 in all muscle groups bilateral.  No tenderness nor crepitation with ROM of foot/ankle joints bilateral.  No tenderness with palpation of bilateral foot and ankle.  Bilateral pes planus foot type.  Bilateral hallux abducto valgus.  Bilateral Tailors bunion.  Rectus alignment of remaining lesser digits bilateral.   Skin:     General: Skin is warm and dry.      Capillary Refill: Capillary refill takes less than 2 seconds.      Findings: Lesion present. No abrasion, bruising, burn, ecchymosis, laceration or petechiae.      Comments: Pedal skin has normal turgor, temperature, and texture bilateral.  Dystrophic changes note to the distal most tip of the Lt. Hallux toenail.  Remaining toenails x 9 appear normotrophic.  Light hyperkeratotic lesions noted to the Lt. Medial hallux, 1st mtp joint, and sub 5th met head.  Examination of the skin reveals no evidence of significant maceration, rashes, open lesions, suspicious  appearing nevi or other concerning lesions.    Neurological:      Mental Status: He is alert and oriented to person, place, and time.      Sensory: No sensory deficit.      Comments: Protective sensation per [place] monofilament is intact bilateral.  Vibratory sensation is intact bilateral.  Light touch is intact bilateral.               Assessment:       Encounter Diagnoses   Name Primary?    Type 2 diabetes mellitus without complication, without long-term current use of insulin Yes    Valgus deformity of both great toes     Tailor's bunion of both feet     Corn or callus              Plan:       Geo was seen today for diabetic foot exam.    Diagnoses and all orders for this visit:    Type 2 diabetes mellitus without complication, without long-term current use of insulin    Valgus deformity of both great toes    Tailor's bunion of both feet    Corn or callus          I counseled the patient on his conditions, their implications and medical management.    Regarding paresthesias of the Lt. LE, I recommend resuming 600mg of alpha lipoic acid QD.    Advised to continue moisturizing the feet with a 40% urea and 2% salicylic acid cream.    Advised to continue wearing shoe gear that accommodates digital deformities.    Shoe inspection. Diabetic Foot Education. Patient reminded of the importance of good nutrition and blood sugar control to help prevent podiatric complications of diabetes. Patient instructed on proper foot hygeine. We discussed wearing proper shoe gear, daily foot inspections, never walking without protective shoe gear, never putting sharp instruments to feet    Patient instructed to inspect his feet, wear protective shoe gear when ambulatory, moisturizer to maintain skin integrity and follow in this office in approximately 12 months, sooner p.r.n.    Ha Tobias DPM

## 2024-12-27 RX ORDER — EMPAGLIFLOZIN AND METFORMIN HYDROCHLORIDE 5; 1000 MG/1; MG/1
1 TABLET ORAL 2 TIMES DAILY
Qty: 180 TABLET | Refills: 12 | Status: SHIPPED | OUTPATIENT
Start: 2024-12-27

## 2024-12-27 NOTE — TELEPHONE ENCOUNTER
No care due was identified.  [facility] Embedded Care Due Messages. Reference number: [id number].   12/27/2024 10:00:26 AM CST

## 2024-12-30 DIAGNOSIS — I10 ESSENTIAL HYPERTENSION: Chronic | ICD-10-CM

## 2024-12-30 NOTE — TELEPHONE ENCOUNTER
No care due was identified.  Health [facility] Embedded Care Due Messages. Reference number: [id number].   12/30/2024 2:06:30 PM CST

## 2024-12-31 RX ORDER — CHLORTHALIDONE 25 MG/1
TABLET ORAL
Qty: 45 TABLET | Refills: 0 | OUTPATIENT
Start: 2024-12-31

## 2024-12-31 NOTE — TELEPHONE ENCOUNTER
Refill Decision Note   Geo Lang  is requesting a refill authorization.  Brief Assessment and Rationale for Refill:  Quick Discontinue     Medication Therapy Plan:  The original prescription was discontinued on 7/15/2024 by Pj Velasco MD      Comments:     Note composed:8:58 AM 12/31/2024

## 2025-01-01 ENCOUNTER — PATIENT MESSAGE (OUTPATIENT)
Dept: INTERNAL MEDICINE | Facility: CLINIC | Age: 60
End: 2025-01-01
Payer: COMMERCIAL

## 2025-01-16 ENCOUNTER — TELEPHONE (OUTPATIENT)
Dept: INTERNAL MEDICINE | Facility: CLINIC | Age: 60
End: 2025-01-16
Payer: COMMERCIAL

## 2025-01-16 NOTE — TELEPHONE ENCOUNTER
----- Message from Job sent at 1/16/2025 10:24 AM CST -----  Contact: [phone]  [email or web address]     Patient is calling for Medical Advice regarding: update on  Chlorthalidone    Patient wants a call back or thru [facility]: call    Comments:    Please advise patient replies from provider may take up to 48 hours.

## 2025-03-22 DIAGNOSIS — K21.9 GASTROESOPHAGEAL REFLUX DISEASE WITHOUT ESOPHAGITIS: ICD-10-CM

## 2025-03-24 RX ORDER — ESOMEPRAZOLE MAGNESIUM 40 MG/1
40 CAPSULE, DELAYED RELEASE ORAL
Qty: 30 CAPSULE | Refills: 0 | Status: SHIPPED | OUTPATIENT
Start: 2025-03-24

## 2025-04-24 DIAGNOSIS — K21.9 GASTROESOPHAGEAL REFLUX DISEASE WITHOUT ESOPHAGITIS: ICD-10-CM

## 2025-04-24 RX ORDER — ESOMEPRAZOLE MAGNESIUM 40 MG/1
40 CAPSULE, DELAYED RELEASE ORAL
Qty: 30 CAPSULE | Refills: 0 | Status: SHIPPED | OUTPATIENT
Start: 2025-04-24

## 2025-05-16 ENCOUNTER — PATIENT MESSAGE (OUTPATIENT)
Dept: INTERNAL MEDICINE | Facility: CLINIC | Age: 60
End: 2025-05-16
Payer: COMMERCIAL

## 2025-05-16 DIAGNOSIS — E11.9 TYPE 2 DIABETES MELLITUS WITHOUT COMPLICATION, WITHOUT LONG-TERM CURRENT USE OF INSULIN: Primary | ICD-10-CM

## 2025-05-18 NOTE — TELEPHONE ENCOUNTER
Care Due:                  Date            Visit Type   Department     Provider  --------------------------------------------------------------------------------                                MYCHART                              FOLLOWUP/OF  [facility] INTERNAL  Last Visit: 11-      FICE VISIT   MEDICINE       Pj Velasco  Next Visit: None Scheduled  None         None Found                                                            Last  Test          Frequency    Reason                     Performed    Due Date  --------------------------------------------------------------------------------    CMP.........  12 months..  atorvastatin.............  07- 07-    HBA1C.......  6 months...  empagliflozin-metformin,   11- 05-                             semaglutide..............    [facility] [employer] Embedded Care Due Messages. Reference number: [id number].   5/18/2025 11:37:43 AM CDT

## 2025-05-18 NOTE — TELEPHONE ENCOUNTER
Patient requesting refill and increase on ozempic 1 mg  Pt's  LOV with Pj Velasco MD , 11/8/2024  Medication pending with increase in dosage    Patient requesting increase in dosage due to appetite increase

## 2025-05-19 RX ORDER — SEMAGLUTIDE 2.68 MG/ML
2 INJECTION, SOLUTION SUBCUTANEOUS
Qty: 3 ML | Refills: 11 | Status: SHIPPED | OUTPATIENT
Start: 2025-05-19 | End: 2026-05-19

## 2025-05-25 DIAGNOSIS — K21.9 GASTROESOPHAGEAL REFLUX DISEASE WITHOUT ESOPHAGITIS: ICD-10-CM

## 2025-05-26 RX ORDER — ESOMEPRAZOLE MAGNESIUM 40 MG/1
40 CAPSULE, DELAYED RELEASE ORAL
Qty: 30 CAPSULE | Refills: 0 | Status: SHIPPED | OUTPATIENT
Start: 2025-05-26

## 2025-06-22 DIAGNOSIS — K21.9 GASTROESOPHAGEAL REFLUX DISEASE WITHOUT ESOPHAGITIS: ICD-10-CM

## 2025-06-23 RX ORDER — ESOMEPRAZOLE MAGNESIUM 40 MG/1
40 CAPSULE, DELAYED RELEASE ORAL
Qty: 30 CAPSULE | Refills: 0 | Status: SHIPPED | OUTPATIENT
Start: 2025-06-23

## 2025-06-23 RX ORDER — ATENOLOL 50 MG/1
75 TABLET ORAL
Qty: 135 TABLET | Refills: 0 | OUTPATIENT
Start: 2025-06-23

## 2025-06-23 NOTE — TELEPHONE ENCOUNTER
No care due was identified.  Health [facility] Embedded Care Due Messages. Reference number: [id number].   6/23/2025 10:50:01 AM CDT

## 2025-06-23 NOTE — TELEPHONE ENCOUNTER
Refill Routing Note   Medication(s) are not appropriate for processing by [facility] for the following reason(s):        Non-participating provider    [facility] action(s):  Route               Appointments  past 12m or future 3m with PCP    Date Provider   Last Visit   11/8/2023 Jyoti Barth PA-C   Next Visit   Visit date not found Jyoti Barth PA-C   ED visits in past 90 days: 0        Note composed:11:05 AM 06/23/2025

## 2025-06-23 NOTE — TELEPHONE ENCOUNTER
No care due was identified.  Health [facility] Embedded Care Due Messages. Reference number: [id number].   6/23/2025 6:42:36 AM CDT

## 2025-06-23 NOTE — TELEPHONE ENCOUNTER
Refill Decision Note   Geo Lang  is requesting a refill authorization.  Brief Assessment and Rationale for Refill:  Quick Discontinue     Medication Therapy Plan:  prescription was discontinued on 11/8/2024 by Pj Velasco MD      Comments:     Note composed:11:37 AM 06/23/2025

## 2025-07-24 DIAGNOSIS — K21.9 GASTROESOPHAGEAL REFLUX DISEASE WITHOUT ESOPHAGITIS: ICD-10-CM

## 2025-07-25 RX ORDER — ESOMEPRAZOLE MAGNESIUM 40 MG/1
40 CAPSULE, DELAYED RELEASE ORAL
Qty: 90 CAPSULE | Refills: 1 | Status: SHIPPED | OUTPATIENT
Start: 2025-07-25

## 2025-07-25 NOTE — TELEPHONE ENCOUNTER
Care Due:                  Date            Visit Type   Department     Provider  --------------------------------------------------------------------------------                                MYCHART                              FOLLOWUP/OF  [facility] INTERNAL  Last Visit: 11-      FICE VISIT   MEDICINE       Pj Velasco                              EP -                              PRIMARY      [facility] INTERNAL  Next Visit: 08-      CARE ([facility])   MEDICINE       Pj Velasco                                                            Last  Test          Frequency    Reason                     Performed    Due Date  --------------------------------------------------------------------------------    CMP.........  12 months..  atorvastatin.............  07- 07-    HBA1C.......  6 months...  empagliflozin-metformin,   11- 05-                             semaglutide..............    [employer] Embedded Care Due Messages. Reference number: [id number].   7/25/2025 11:12:03 AM CDT

## 2025-07-25 NOTE — TELEPHONE ENCOUNTER
Refill Routing Note   Medication(s) are not appropriate for processing by [facility] for the following reason(s):        No active prescription written by provider    [facility] action(s):  Defer   Requires labs : Yes CMP, A1C            Appointments  past 12m or future 3m with PCP    Date Provider   Last Visit   11/8/2024 Pj Velasco MD   Next Visit   8/29/2025 Pj Velasco MD   ED visits in past 90 days: 0        Note composed:11:19 AM 07/25/2025

## 2025-08-29 ENCOUNTER — OFFICE VISIT (OUTPATIENT)
Dept: INTERNAL MEDICINE | Facility: CLINIC | Age: 60
End: 2025-08-29
Payer: COMMERCIAL

## 2025-08-29 VITALS
OXYGEN SATURATION: 97 % | SYSTOLIC BLOOD PRESSURE: 114 MMHG | HEART RATE: 92 BPM | BODY MASS INDEX: 30.83 KG/M2 | HEIGHT: 71 IN | WEIGHT: 220.25 LBS | DIASTOLIC BLOOD PRESSURE: 82 MMHG

## 2025-08-29 DIAGNOSIS — E11.9 TYPE 2 DIABETES MELLITUS WITHOUT COMPLICATION, WITHOUT LONG-TERM CURRENT USE OF INSULIN: ICD-10-CM

## 2025-08-29 DIAGNOSIS — Z00.00 ROUTINE PHYSICAL EXAMINATION: Primary | ICD-10-CM

## 2025-08-29 DIAGNOSIS — I10 ESSENTIAL HYPERTENSION: ICD-10-CM

## 2025-08-29 DIAGNOSIS — K21.9 GASTROESOPHAGEAL REFLUX DISEASE WITHOUT ESOPHAGITIS: ICD-10-CM

## 2025-08-29 DIAGNOSIS — M70.72 BURSITIS OF LEFT HIP, UNSPECIFIED BURSA: ICD-10-CM

## 2025-08-29 DIAGNOSIS — G47.00 INSOMNIA, UNSPECIFIED TYPE: ICD-10-CM

## 2025-08-29 PROCEDURE — 99999 PR PBB SHADOW E&M-EST. PATIENT-LVL V: CPT | Mod: PBBFAC,,, | Performed by: INTERNAL MEDICINE

## 2025-08-29 RX ORDER — FLUTICASONE PROPIONATE 50 MCG
2 SPRAY, SUSPENSION (ML) NASAL DAILY PRN
Qty: 18.2 ML | Refills: 3 | Status: SHIPPED | OUTPATIENT
Start: 2025-08-29

## 2025-08-29 RX ORDER — ALPRAZOLAM 1 MG/1
TABLET ORAL
Qty: 30 TABLET | Refills: 0 | Status: SHIPPED | OUTPATIENT
Start: 2025-08-29

## 2025-08-29 RX ORDER — ATENOLOL 50 MG/1
TABLET ORAL
COMMUNITY

## 2025-08-29 RX ORDER — MUPIROCIN 20 MG/G
OINTMENT TOPICAL 3 TIMES DAILY
Qty: 30 G | Refills: 25 | Status: SHIPPED | OUTPATIENT
Start: 2025-08-29